# Patient Record
Sex: FEMALE | Race: WHITE | NOT HISPANIC OR LATINO | Employment: STUDENT
[De-identification: names, ages, dates, MRNs, and addresses within clinical notes are randomized per-mention and may not be internally consistent; named-entity substitution may affect disease eponyms.]

---

## 2017-12-24 ENCOUNTER — HEALTH MAINTENANCE LETTER (OUTPATIENT)
Age: 22
End: 2017-12-24

## 2019-03-01 ENCOUNTER — ANESTHESIA - HEALTHEAST (OUTPATIENT)
Dept: SURGERY | Facility: CLINIC | Age: 24
End: 2019-03-01

## 2019-03-01 ENCOUNTER — SURGERY - HEALTHEAST (OUTPATIENT)
Dept: SURGERY | Facility: CLINIC | Age: 24
End: 2019-03-01

## 2019-03-01 ASSESSMENT — MIFFLIN-ST. JEOR: SCORE: 1384.99

## 2019-03-02 ASSESSMENT — MIFFLIN-ST. JEOR: SCORE: 1429.43

## 2021-06-02 VITALS — HEIGHT: 68 IN | WEIGHT: 139.9 LBS | BODY MASS INDEX: 21.2 KG/M2

## 2021-06-03 ENCOUNTER — RECORDS - HEALTHEAST (OUTPATIENT)
Dept: ADMINISTRATIVE | Facility: CLINIC | Age: 26
End: 2021-06-03

## 2021-06-24 NOTE — ANESTHESIA PREPROCEDURE EVALUATION
Anesthesia Evaluation      Patient summary reviewed   No history of anesthetic complications     Airway   Mallampati: II   Pulmonary - negative ROS and normal exam                          Cardiovascular - negative ROS and normal exam   Neuro/Psych - negative ROS     Endo/Other - negative ROS      GI/Hepatic/Renal - negative ROS           Dental                         Anesthesia Plan  Planned anesthetic: general endotracheal    ASA 1 - emergent     Anesthetic plan and risks discussed with: patient  Anesthesia plan special considerations: rapid sequence induction,

## 2021-06-24 NOTE — ANESTHESIA POSTPROCEDURE EVALUATION
Patient: Shital Tamayo  DILATION AND CURETTAGE, UTERUS, USING SUCTION  Anesthesia type: general    Patient location: PACU  Last vitals:   Vitals:    03/02/19 0110   BP: 105/65   Pulse: 73   Resp: 16   Temp:    SpO2: 97%     Post vital signs: stable  Level of consciousness: awake and responds to simple questions  Post-anesthesia pain: pain controlled  Post-anesthesia nausea and vomiting: no  Pulmonary: unassisted, return to baseline  Cardiovascular: stable and blood pressure at baseline  Hydration: adequate  Anesthetic events: no    QCDR Measures:  ASA# 11 - Yasmeen-op Cardiac Arrest: ASA11B - Patient did NOT experience unanticipated cardiac arrest  ASA# 12 - Yasmeen-op Mortality Rate: ASA12B - Patient did NOT die  ASA# 13 - PACU Re-Intubation Rate: ASA13B - Patient did NOT require a new airway mgmt  ASA# 10 - Composite Anes Safety: ASA10A - No serious adverse event    Additional Notes:

## 2021-12-20 ENCOUNTER — TRANSFERRED RECORDS (OUTPATIENT)
Dept: HEALTH INFORMATION MANAGEMENT | Facility: CLINIC | Age: 26
End: 2021-12-20
Payer: COMMERCIAL

## 2021-12-21 LAB
HEPATITIS B SURFACE ANTIGEN (EXTERNAL): NEGATIVE
HEPATITIS C ANTIBODY (EXTERNAL): NEGATIVE
HIV1+2 AB SERPL QL IA: NONREACTIVE
RUBELLA ANTIBODY IGG (EXTERNAL): NORMAL
TREPONEMA PALLIDUM ANTIBODY (EXTERNAL): NONREACTIVE

## 2021-12-27 ENCOUNTER — MEDICAL CORRESPONDENCE (OUTPATIENT)
Dept: HEALTH INFORMATION MANAGEMENT | Facility: CLINIC | Age: 26
End: 2021-12-27
Payer: COMMERCIAL

## 2021-12-29 ENCOUNTER — TRANSCRIBE ORDERS (OUTPATIENT)
Dept: OTHER | Age: 26
End: 2021-12-29
Payer: COMMERCIAL

## 2021-12-29 DIAGNOSIS — G43.909 MIGRAINE WITHOUT STATUS MIGRAINOSUS, NOT INTRACTABLE, UNSPECIFIED MIGRAINE TYPE: Primary | ICD-10-CM

## 2022-01-28 ENCOUNTER — TRANSFERRED RECORDS (OUTPATIENT)
Dept: HEALTH INFORMATION MANAGEMENT | Facility: CLINIC | Age: 27
End: 2022-01-28
Payer: COMMERCIAL

## 2022-01-28 ENCOUNTER — MEDICAL CORRESPONDENCE (OUTPATIENT)
Dept: HEALTH INFORMATION MANAGEMENT | Facility: CLINIC | Age: 27
End: 2022-01-28
Payer: COMMERCIAL

## 2022-02-14 NOTE — TELEPHONE ENCOUNTER
FUTURE VISIT INFORMATION      FUTURE VISIT INFORMATION:    Date: 2/23/2022    Time: 830am    [place] [facility] [place] INFORMATION:    Referring provider:  Dr. Nieves    Referring providers clinic:  [facility]     Reason for visit/diagnosis  Headaches     RECORDS REQUESTED FROM:       Clinic name Comments Records Status Imaging Status   [facility]  Scanned to Chart No Images          [facility]  CT Head-6/18/2018, 12/5/2017    CT Cervical Spine-12/5/2017 Care Everywhere Requested to PACS                       2/14/2022-Request for images faxed to [facility] @ 905am    2/22/2022-[facility] Images now in PACS-MR @ 526am

## 2022-02-23 ENCOUNTER — TELEPHONE (OUTPATIENT)
Dept: PHYSICAL MEDICINE AND REHAB | Facility: CLINIC | Age: 27
End: 2022-02-23

## 2022-02-23 ENCOUNTER — PRE VISIT (OUTPATIENT)
Dept: NEUROLOGY | Facility: CLINIC | Age: 27
End: 2022-02-23

## 2022-02-23 ENCOUNTER — VIRTUAL VISIT (OUTPATIENT)
Dept: NEUROLOGY | Facility: CLINIC | Age: 27
End: 2022-02-23
Attending: OBSTETRICS & GYNECOLOGY
Payer: COMMERCIAL

## 2022-02-23 DIAGNOSIS — H81.10 BENIGN PAROXYSMAL POSITIONAL VERTIGO, UNSPECIFIED LATERALITY: ICD-10-CM

## 2022-02-23 DIAGNOSIS — G43.719 INTRACTABLE CHRONIC MIGRAINE WITHOUT AURA AND WITHOUT STATUS MIGRAINOSUS: Primary | ICD-10-CM

## 2022-02-23 PROCEDURE — 99205 OFFICE O/P NEW HI 60 MIN: CPT | Mod: 95 | Performed by: NURSE PRACTITIONER

## 2022-02-23 RX ORDER — OXYCODONE AND ACETAMINOPHEN 5; 325 MG/1; MG/1
1 TABLET ORAL
Status: ON HOLD | COMMUNITY
Start: 2022-01-26 | End: 2022-05-16

## 2022-02-23 RX ORDER — LIDOCAINE HYDROCHLORIDE 40 MG/ML
SOLUTION TOPICAL
Qty: 50 ML | Refills: 9 | Status: ON HOLD | OUTPATIENT
Start: 2022-02-23 | End: 2022-05-16

## 2022-02-23 ASSESSMENT — HEADACHE IMPACT TEST (HIT 6)
HOW OFTEN HAVE YOU FELT TOO TIRED TO WORK BECAUSE OF YOUR HEADACHES: VERY OFTEN
WHEN YOU HAVE HEADACHES HOW OFTEN IS THE PAIN SEVERE: VERY OFTEN
WHEN YOU HAVE A HEADACHE HOW OFTEN DO YOU WISH YOU COULD LIE DOWN: VERY OFTEN
HIT6 TOTAL SCORE: 64
HOW OFTEN DO HEADACHES LIMIT YOUR DAILY ACTIVITIES: SOMETIMES
HOW OFTEN DO HEADACHES LIMIT YOUR DAILY ACTIVITIES: SOMETIMES
HOW OFTEN HAVE YOU FELT TOO TIRED TO WORK BECAUSE OF YOUR HEADACHES: VERY OFTEN
HOW OFTEN DID HEADACHS LIMIT CONCENTRATION ON WORK OR DAILY ACTIVITY: VERY OFTEN
WHEN YOU HAVE A HEADACHE HOW OFTEN DO YOU WISH YOU COULD LIE DOWN: VERY OFTEN
HIT6 TOTAL SCORE: 64
HOW OFTEN HAVE YOU FELT FED UP OR IRRITATED BECAUSE OF YOUR HEADACHES: SOMETIMES
HOW OFTEN DID HEADACHS LIMIT CONCENTRATION ON WORK OR DAILY ACTIVITY: VERY OFTEN
HOW OFTEN HAVE YOU FELT FED UP OR IRRITATED BECAUSE OF YOUR HEADACHES: SOMETIMES
WHEN YOU HAVE HEADACHES HOW OFTEN IS THE PAIN SEVERE: VERY OFTEN

## 2022-02-23 NOTE — TELEPHONE ENCOUNTER
[facility] Call Center    Phone Message    May a detailed message be left on voicemail: yes     Reason for Call: Appointment Intake    Referring Provider Name: Jessica Zapien  Diagnosis and/or Symptoms: Vertigo,     Pt has a referral to be seen for Benign paroxysmal positional vertigo, unspecified laterality .  This is an urgent referral and referring provider requesteed 1-2 weeks.  Per protocol,  I am unable to schedule.  Please review and contact patient to schedule as appropriate.     Action Taken: Other[place] [facility]    Travel Screening: Not Applicable

## 2022-02-23 NOTE — LETTER
2/23/2022       RE: Shital Tamayo  [address] [address]     Dear Colleague,    Thank you for referring your patient, Shital Tamayo, to the [facility] [employer] [facility]. Please see a copy of my visit note below.        Distant Location (provider location):  [facility]OLOG[facility]     Platform used for Video Visit: RozinaWell     MIGRAINE DISABILITY ASSESSMENT (MIDAS)    On how many days in the last 3 months did you miss work or school because of your headaches?  6    How many days in the last 3 months was your productivity at work or school reduced by half or more because of your headaches? (Do not include days you counted in question 1 where you missed work or school.)  7    On how many days in the last 3 months did you not do household work (such as housework, home repairs and maintenance, shopping, caring for children and relatives) because of your headaches?  7    How many days in the last 3 months was your productivity in household work reduced by half or more because of your headaches? (Do not include days you counted in question 3 where you did not do household work).  10    On how many days in the last 3 months did you miss family, social, or lesiure activities because of your headaches?  7    MIDAS Total Score: 37    On how many days in the last 3 months did you have a headache? (If a headache lasted more than 1 day, count each day.)   15    On a scale of 0 - 10, on average how painful were these headaches (where 0 = no pain at all, and 10 = pain as bad as it can be.)  8    Last Patient-Answered HIT-6 Questionnaire  HIT-6 2/23/2022   When you have headaches, how often is the pain severe 11   How often do headaches limit your ability to do usual daily activities including household work, work, school, or social activities? 10   When you have a headache, how often do you wish you could lie  down? 11   In the past 4 weeks, how often have you felt too tired to do work or daily activities because of your headaches 11   In the past 4 weeks, how often have you felt fed up or irritated because of your headaches 10   In the past 4 weeks, how often did headaches limit your ability to concentrate on work or daily activities 11   HIT-6 Total Score 64       Shital is a 27 year old who is being evaluated via a billable video visit.        Assessment & Plan     (G43.062) Intractable chronic migraine without aura and without status migrainosus  (primary encounter diagnosis)  Comment: see below  Plan: lidocaine (XYLOCAINE) 4 % external solution,         PHYSIATRY REFERRAL, Syringe, Disposable,         (SYRINGE LUER SLIP) 1 ML MISC, ATOMIZER/POWDER         BLOWER, MRA Brain ([facility]) wo         Contrast            (H81.10) Benign paroxysmal positional vertigo, unspecified laterality  Comment: see below  Plan: Physical Therapy Referral     Chronic migraine and vertigo. Vertigo possible BPV vs migrainous  Discussed treatment options  ONB/TP -PMR referral   PT myofascial release  -can be an option  Lidocaine nasal spay -Order as a lidocaine 4% -50 ml bottle   a vial of lidocaine 4% topical with a 1 mL syringe with atomizer tip  Spray 0.5 mg once in each nostril q 4-6 hours as needed for headache.  lay down with head tilted back for 5 minutes after if preferred   May try ondansetron for nausea if Ok with Ob+may try benadryl +acetaminophen  Vitamin B2 200 mg OTC daily   May try adding magnesium if Ok per Ob  May try a small dose of gabapentin 100 mg  as needed for a severe pain -check with Ob   For headache prevention-may consider cyproheptadine -will check with PharmD   Follow up as needed     Vertigo -referral to PT Vertigo/Dizziness Center     FH of aneurysm-mother had 3 treated and paternal side-uncles and cousins. Brain MRA in 2014 presumed normal. Brain MRA for reassurance.     SH:  School psychologist        Mary Isaacs is a 27 year old who presents for the following health issues -headache, dizziness  HPI   Second pregnancy but first child, 17 weeks. HEATH August 6th.   All migraine treatment stopped.   Migraine headaches before pregnancy. Associated with light and noise sensitivity, movement would trigger headaches. Typical loc -left side and pounding and sometimes a dull feeling.   Headaches onset in early 20s. FH-of headaches and brain aneurysm.   Headaches currently 3-4/week and duration 3-9 hours. Tota headaches daily but some more or less severe.   Before pregnancy-3-4/week and chronic migraine headaches.   Headache treatment   Propranolol, topiramate, amitriptyline, tizanidine, sumatriptan as needed  Sumatriptan worked with other meds.   Currently uses tylenol as needed and sometimes advil. Percocet once in a while.   Compazine as needed in the past.     Vertigo is new -first time three weeks ago and a few times per week. Vertigo triggered by moving her head, bending, laying down. Would last all day and nausea. Gets headache. Vision is spinning. Feels really dizzy. Patient is staying hydrated.   A diet coke half a can per day.     History of anxiety and on lexapro. Anxiety controlled.     No new headache symptoms reported today.     Brain MRI and MRA in 2013 -presumed normal, not able to review images -not available  Brain MRI in 2014-  Impression:    1. Normal brain.    2. Pituitary gland is normal in size.  No imaging evidence for pituitary adenoma.    3. Right maxillary sinus mucous retention cyst.        Head CT in 2018-reviewed report and normal    Patient has been to [facility] -note is blurry unable to review      DATA:  Records reviewed-Ob/Gyn note      Objective    Vitals - Patient Reported  Weight (Patient Reported): 65.8 kg (145 lb)  Pain Score: No Pain (0)    Physical Exam   GENERAL: Healthy, alert and no distress  EYES: Eyes grossly normal to inspection.  No discharge or erythema, or  obvious scleral/conjunctival abnormalities.  RESP: No audible wheeze, cough, or visible cyanosis.  No visible retractions or increased work of breathing.    SKIN: Visible skin clear. No significant rash, abnormal pigmentation or lesions.  NEURO: Face is symmetrical and symmetrical facial expressions.  Mentation and speech appropriate for age.  PSYCH: Mentation appears normal, affect normal, judgement and insight intact, normal speech and appearance well-groomed.    I discussed all my recommendations with Shital Tamayo who verbalizes understanding and comfortable with the plan.  All of patient's questions were answered from the best of my knowledge.  Patient is in agreement with the plan.     58 minutes spent on the date of the encounter doing video access, chart  review,  results review,  meds review, treatment plan, documentation and further activities as noted above    KYLE Tompkins, CNP [facility]  Headache certified  [facility]

## 2022-02-23 NOTE — PROGRESS NOTES
Shital is a 27 year old who is being evaluated via a billable video visit.      How would you like to obtain your AVS? MyChart  If the video visit is dropped, the invitation should be resent by: Text to cell phone: [phone]  Will anyone else be joining your video visit? No      Video Start Time: 8:31 AM  Video-Visit Details    Type of service:  Video Visit    Video End Time:9:24 AM    Originating Location (pt. Location): Home    Distant Location (provider location):  [facility] NEUROLOG[facility]     Platform used for Video Visit: Lisa     MIGRAINE DISABILITY ASSESSMENT (MIDAS)    On how many days in the last 3 months did you miss work or school because of your headaches?  6    How many days in the last 3 months was your productivity at work or school reduced by half or more because of your headaches? (Do not include days you counted in question 1 where you missed work or school.)  7    On how many days in the last 3 months did you not do household work (such as housework, home repairs and maintenance, shopping, caring for children and relatives) because of your headaches?  7    How many days in the last 3 months was your productivity in household work reduced by half or more because of your headaches? (Do not include days you counted in question 3 where you did not do household work).  10    On how many days in the last 3 months did you miss family, social, or lesiure activities because of your headaches?  7    MIDAS Total Score: 37    On how many days in the last 3 months did you have a headache? (If a headache lasted more than 1 day, count each day.)   15    On a scale of 0 - 10, on average how painful were these headaches (where 0 = no pain at all, and 10 = pain as bad as it can be.)  8    Last Patient-Answered HIT-6 Questionnaire  HIT-6 2/23/2022   When you have headaches, how often is the pain severe 11   How often do headaches limit your ability to do usual daily activities including  household work, work, school, or social activities? 10   When you have a headache, how often do you wish you could lie down? 11   In the past 4 weeks, how often have you felt too tired to do work or daily activities because of your headaches 11   In the past 4 weeks, how often have you felt fed up or irritated because of your headaches 10   In the past 4 weeks, how often did headaches limit your ability to concentrate on work or daily activities 11   HIT-6 Total Score 64       Shital is a 27 year old who is being evaluated via a billable video visit.        Assessment & Plan     (G43.719) Intractable chronic migraine without aura and without status migrainosus  (primary encounter diagnosis)  Comment: see below  Plan: lidocaine (XYLOCAINE) 4 % external solution,         PHYSIATRY REFERRAL, Syringe, Disposable,         (SYRINGE LUER SLIP) 1 ML MISC, ATOMIZER/POWDER         BLOWER, MRA Brain ([facility]) wo         Contrast            (H81.10) Benign paroxysmal positional vertigo, unspecified laterality  Comment: see below  Plan: Physical Therapy Referral     Chronic migraine and vertigo. Vertigo possible BPV vs migrainous  Discussed treatment options  ONB/TP -PMR referral   PT myofascial release  -can be an option  Lidocaine nasal spay -Order as a lidocaine 4% -50 ml bottle   a vial of lidocaine 4% topical with a 1 mL syringe with atomizer tip  Spray 0.5 mg once in each nostril q 4-6 hours as needed for headache.  lay down with head tilted back for 5 minutes after if preferred   May try ondansetron for nausea if Ok with Ob+may try benadryl +acetaminophen  Vitamin B2 200 mg OTC daily   May try adding magnesium if Ok per Ob  May try a small dose of gabapentin 100 mg  as needed for a severe pain -check with Ob   For headache prevention-may consider cyproheptadine -will check with PharmD   Follow up as needed     Vertigo -referral to PT Vertigo/Dizziness Center     FH of aneurysm-mother had 3 treated and paternal  side-uncles and cousins. Brain MRA in 2014 presumed normal. Brain MRA for reassurance.     SH:  School psychologist       Mary   Shital is a 27 year old who presents for the following health issues -headache, dizziness  HPI   Second pregnancy but first child, 17 weeks. HEATH August 6th.   All migraine treatment stopped.   Migraine headaches before pregnancy. Associated with light and noise sensitivity, movement would trigger headaches. Typical loc -left side and pounding and sometimes a dull feeling.   Headaches onset in early 20s. FH-of headaches and brain aneurysm.   Headaches currently 3-4/week and duration 3-9 hours. Tota headaches daily but some more or less severe.   Before pregnancy-3-4/week and chronic migraine headaches.   Headache treatment   Propranolol, topiramate, amitriptyline, tizanidine, sumatriptan as needed  Sumatriptan worked with other meds.   Currently uses tylenol as needed and sometimes advil. Percocet once in a while.   Compazine as needed in the past.     Vertigo is new -first time three weeks ago and a few times per week. Vertigo triggered by moving her head, bending, laying down. Would last all day and nausea. Gets headache. Vision is spinning. Feels really dizzy. Patient is staying hydrated.   A diet coke half a can per day.     History of anxiety and on lexapro. Anxiety controlled.     No new headache symptoms reported today.     Brain MRI and MRA in 2013 -presumed normal, not able to review images -not available  Brain MRI in 2014-  Impression:    1. Normal brain.    2. Pituitary gland is normal in size.  No imaging evidence for pituitary adenoma.    3. Right maxillary sinus mucous retention cyst.        Head CT in 2018-reviewed report and normal    Patient has been to [facility] -note is blurry unable to review      DATA:  Records reviewed-Ob/Gyn note      Objective    Vitals - Patient Reported  Weight (Patient Reported): 65.8 kg (145 lb)  Pain Score: No Pain (0)    Physical  Exam   GENERAL: Healthy, alert and no distress  EYES: Eyes grossly normal to inspection.  No discharge or erythema, or obvious scleral/conjunctival abnormalities.  RESP: No audible wheeze, cough, or visible cyanosis.  No visible retractions or increased work of breathing.    SKIN: Visible skin clear. No significant rash, abnormal pigmentation or lesions.  NEURO: Face is symmetrical and symmetrical facial expressions.  Mentation and speech appropriate for age.  PSYCH: Mentation appears normal, affect normal, judgement and insight intact, normal speech and appearance well-groomed.    I discussed all my recommendations with Shital Tamayo who verbalizes understanding and comfortable with the plan.  All of patient's questions were answered from the best of my knowledge.  Patient is in agreement with the plan.     58 minutes spent on the date of the encounter doing video access, chart  review,  results review,  meds review, treatment plan, documentation and further activities as noted above    KYLE Tompkins, CNP [facility]  Headache certified  [facility]

## 2022-02-24 NOTE — TELEPHONE ENCOUNTER
Talked with patient and scheduled her for a consult appt with Dr. Platt on 3/18/22 at 3:50 PM. Patient is a referral from Gadiel Zapien for Chronic Migraine Dx.    Gerardo Dan, EMT

## 2022-03-07 ENCOUNTER — DOCUMENTATION ONLY (OUTPATIENT)
Dept: PHYSICAL MEDICINE AND REHAB | Facility: CLINIC | Age: 27
End: 2022-03-07
Payer: COMMERCIAL

## 2022-03-07 NOTE — PROGRESS NOTES
Tried to call patient multiple times in the last week, but patient has not answered and voicemail box is full. Patient's mother has also not answered and voicemail box is full. Patient's appointment on 3/18/22 with Dr. Platt had to be moved due to template, so a new appointment was scheduled for patient on 3/25/22 at 9 AM.     Sent patient a letter to confirm this new appointment day and time, along with the call center number in case any changes/re-schedules need to occur.    Gerardo Dan, EMT

## 2022-03-25 ENCOUNTER — TRANSFERRED RECORDS (OUTPATIENT)
Dept: HEALTH INFORMATION MANAGEMENT | Facility: CLINIC | Age: 27
End: 2022-03-25

## 2022-04-29 ENCOUNTER — MEDICAL CORRESPONDENCE (OUTPATIENT)
Dept: HEALTH INFORMATION MANAGEMENT | Facility: CLINIC | Age: 27
End: 2022-04-29
Payer: COMMERCIAL

## 2022-05-02 ENCOUNTER — TRANSCRIBE ORDERS (OUTPATIENT)
Dept: MATERNAL FETAL MEDICINE | Facility: HOSPITAL | Age: 27
End: 2022-05-02
Payer: MEDICAID

## 2022-05-02 DIAGNOSIS — O26.90 PREGNANCY RELATED CONDITION: Primary | ICD-10-CM

## 2022-05-05 ENCOUNTER — PRE VISIT (OUTPATIENT)
Dept: MATERNAL FETAL MEDICINE | Facility: HOSPITAL | Age: 27
End: 2022-05-05
Payer: MEDICAID

## 2022-05-09 ENCOUNTER — TELEPHONE (OUTPATIENT)
Dept: MATERNAL FETAL MEDICINE | Facility: CLINIC | Age: 27
End: 2022-05-09

## 2022-05-09 ENCOUNTER — ANCILLARY PROCEDURE (OUTPATIENT)
Dept: ULTRASOUND IMAGING | Facility: HOSPITAL | Age: 27
End: 2022-05-09
Attending: OBSTETRICS & GYNECOLOGY
Payer: MEDICAID

## 2022-05-09 ENCOUNTER — OFFICE VISIT (OUTPATIENT)
Dept: MATERNAL FETAL MEDICINE | Facility: HOSPITAL | Age: 27
End: 2022-05-09
Attending: OBSTETRICS & GYNECOLOGY
Payer: MEDICAID

## 2022-05-09 ENCOUNTER — LAB (OUTPATIENT)
Dept: LAB | Facility: HOSPITAL | Age: 27
End: 2022-05-09
Attending: OBSTETRICS & GYNECOLOGY
Payer: MEDICAID

## 2022-05-09 VITALS — SYSTOLIC BLOOD PRESSURE: 136 MMHG | DIASTOLIC BLOOD PRESSURE: 90 MMHG

## 2022-05-09 DIAGNOSIS — O36.5990 PREGNANCY AFFECTED BY FETAL GROWTH RESTRICTION: Primary | ICD-10-CM

## 2022-05-09 DIAGNOSIS — O36.5990 PREGNANCY AFFECTED BY FETAL GROWTH RESTRICTION: ICD-10-CM

## 2022-05-09 DIAGNOSIS — O26.90 PREGNANCY RELATED CONDITION: ICD-10-CM

## 2022-05-09 LAB
ALBUMIN MFR UR ELPH: <7 MG/DL
ALT SERPL W P-5'-P-CCNC: <9 U/L (ref 0–45)
AST SERPL W P-5'-P-CCNC: 15 U/L (ref 0–40)
CREAT SERPL-MCNC: 0.66 MG/DL (ref 0.6–1.1)
CREAT UR-MCNC: 16 MG/DL
ERYTHROCYTE [DISTWIDTH] IN BLOOD BY AUTOMATED COUNT: 12.8 % (ref 10–15)
GFR SERPL CREATININE-BSD FRML MDRD: >90 ML/MIN/1.73M2
HCT VFR BLD AUTO: 37.2 % (ref 35–47)
HGB BLD-MCNC: 12.3 G/DL (ref 11.7–15.7)
MCH RBC QN AUTO: 29.4 PG (ref 26.5–33)
MCHC RBC AUTO-ENTMCNC: 33.1 G/DL (ref 31.5–36.5)
MCV RBC AUTO: 89 FL (ref 78–100)
PLATELET # BLD AUTO: 249 10E3/UL (ref 150–450)
PROT/CREAT 24H UR: NORMAL MG/G{CREAT}
RBC # BLD AUTO: 4.19 10E6/UL (ref 3.8–5.2)
WBC # BLD AUTO: 10.9 10E3/UL (ref 4–11)

## 2022-05-09 PROCEDURE — 59025 FETAL NON-STRESS TEST: CPT

## 2022-05-09 PROCEDURE — 36415 COLL VENOUS BLD VENIPUNCTURE: CPT

## 2022-05-09 PROCEDURE — 82565 ASSAY OF CREATININE: CPT

## 2022-05-09 PROCEDURE — 84450 TRANSFERASE (AST) (SGOT): CPT

## 2022-05-09 PROCEDURE — 85027 COMPLETE CBC AUTOMATED: CPT

## 2022-05-09 PROCEDURE — 76811 OB US DETAILED SNGL FETUS: CPT

## 2022-05-09 PROCEDURE — 84460 ALANINE AMINO (ALT) (SGPT): CPT

## 2022-05-09 PROCEDURE — 59025 FETAL NON-STRESS TEST: CPT | Mod: 26 | Performed by: OBSTETRICS & GYNECOLOGY

## 2022-05-09 PROCEDURE — 84156 ASSAY OF PROTEIN URINE: CPT

## 2022-05-09 PROCEDURE — 76820 UMBILICAL ARTERY ECHO: CPT | Mod: 26 | Performed by: OBSTETRICS & GYNECOLOGY

## 2022-05-09 PROCEDURE — 76811 OB US DETAILED SNGL FETUS: CPT | Mod: 26 | Performed by: OBSTETRICS & GYNECOLOGY

## 2022-05-09 PROCEDURE — 99204 OFFICE O/P NEW MOD 45 MIN: CPT | Mod: 25 | Performed by: OBSTETRICS & GYNECOLOGY

## 2022-05-09 NOTE — TELEPHONE ENCOUNTER
I called Shital to let her know her HELLP labs were within normal limits today.  Will continue with twice weekly NST/UAR in our office.    Gladis Bella MD  Maternal Fetal Medicine

## 2022-05-09 NOTE — PROGRESS NOTES
"Please see \"Imaging\" tab under Chart Review for full details.    Gladis Bella MD  Maternal Fetal Medicine    "

## 2022-05-12 ENCOUNTER — HOSPITAL ENCOUNTER (OUTPATIENT)
Dept: ULTRASOUND IMAGING | Facility: CLINIC | Age: 27
Discharge: HOME | End: 2022-05-12
Attending: OBSTETRICS & GYNECOLOGY
Payer: MEDICAID

## 2022-05-12 ENCOUNTER — OFFICE VISIT (OUTPATIENT)
Dept: MATERNAL FETAL MEDICINE | Facility: CLINIC | Age: 27
End: 2022-05-12
Attending: OBSTETRICS & GYNECOLOGY
Payer: MEDICAID

## 2022-05-12 DIAGNOSIS — O36.5990 PREGNANCY AFFECTED BY FETAL GROWTH RESTRICTION: Primary | ICD-10-CM

## 2022-05-12 DIAGNOSIS — O36.5990 PREGNANCY AFFECTED BY FETAL GROWTH RESTRICTION: ICD-10-CM

## 2022-05-12 PROCEDURE — 59025 FETAL NON-STRESS TEST: CPT

## 2022-05-12 PROCEDURE — 59025 FETAL NON-STRESS TEST: CPT | Mod: 26 | Performed by: OBSTETRICS & GYNECOLOGY

## 2022-05-12 PROCEDURE — 76820 UMBILICAL ARTERY ECHO: CPT | Mod: 26 | Performed by: OBSTETRICS & GYNECOLOGY

## 2022-05-12 PROCEDURE — 76815 OB US LIMITED FETUS(S): CPT | Mod: 26 | Performed by: OBSTETRICS & GYNECOLOGY

## 2022-05-12 PROCEDURE — 76815 OB US LIMITED FETUS(S): CPT

## 2022-05-12 PROCEDURE — 59025 FETAL NON-STRESS TEST: CPT | Performed by: OBSTETRICS & GYNECOLOGY

## 2022-05-12 NOTE — PROGRESS NOTES
"Please see \"Imaging\" tab under \"Chart Review\" for details of today's visit.    Joceline Garcia    "

## 2022-05-15 ENCOUNTER — HEALTH MAINTENANCE LETTER (OUTPATIENT)
Age: 27
End: 2022-05-15

## 2022-05-16 ENCOUNTER — HOSPITAL ENCOUNTER (OUTPATIENT)
Facility: HOSPITAL | Age: 27
Discharge: HOME | End: 2022-05-16
Attending: OBSTETRICS & GYNECOLOGY | Admitting: OBSTETRICS & GYNECOLOGY
Payer: MEDICAID

## 2022-05-16 ENCOUNTER — OFFICE VISIT (OUTPATIENT)
Dept: MATERNAL FETAL MEDICINE | Facility: HOSPITAL | Age: 27
End: 2022-05-16
Attending: OBSTETRICS & GYNECOLOGY
Payer: MEDICAID

## 2022-05-16 ENCOUNTER — ANCILLARY PROCEDURE (OUTPATIENT)
Dept: ULTRASOUND IMAGING | Facility: HOSPITAL | Age: 27
End: 2022-05-16
Attending: OBSTETRICS & GYNECOLOGY
Payer: MEDICAID

## 2022-05-16 VITALS — SYSTOLIC BLOOD PRESSURE: 140 MMHG | TEMPERATURE: 98.3 F | DIASTOLIC BLOOD PRESSURE: 96 MMHG

## 2022-05-16 DIAGNOSIS — O36.5990 PREGNANCY AFFECTED BY FETAL GROWTH RESTRICTION: Primary | ICD-10-CM

## 2022-05-16 DIAGNOSIS — O36.5990 PREGNANCY AFFECTED BY FETAL GROWTH RESTRICTION: ICD-10-CM

## 2022-05-16 PROBLEM — Z36.89 ENCOUNTER FOR TRIAGE IN PREGNANT PATIENT: Status: ACTIVE | Noted: 2022-05-16

## 2022-05-16 LAB
ALBUMIN UR-MCNC: NEGATIVE MG/DL
ALT SERPL W P-5'-P-CCNC: 13 U/L (ref 0–45)
APPEARANCE UR: CLEAR
AST SERPL W P-5'-P-CCNC: 14 U/L (ref 0–40)
BILIRUB UR QL STRIP: NEGATIVE
CLUE CELLS: NORMAL
COLOR UR AUTO: COLORLESS
CREAT SERPL-MCNC: 0.63 MG/DL (ref 0.6–1.1)
ERYTHROCYTE [DISTWIDTH] IN BLOOD BY AUTOMATED COUNT: 12.8 % (ref 10–15)
FFN SPECIMEN INTEGRITY: NORMAL
FIBRONECTIN FETAL VAG QL: NEGATIVE
GFR SERPL CREATININE-BSD FRML MDRD: >90 ML/MIN/1.73M2
GLUCOSE UR STRIP-MCNC: NEGATIVE MG/DL
HCT VFR BLD AUTO: 37 % (ref 35–47)
HGB BLD-MCNC: 12.3 G/DL (ref 11.7–15.7)
HGB UR QL STRIP: NEGATIVE
HOLD SPECIMEN: NORMAL
HOLD SPECIMEN: NORMAL
KETONES UR STRIP-MCNC: NEGATIVE MG/DL
LEUKOCYTE ESTERASE UR QL STRIP: NEGATIVE
MCH RBC QN AUTO: 29.1 PG (ref 26.5–33)
MCHC RBC AUTO-ENTMCNC: 33.2 G/DL (ref 31.5–36.5)
MCV RBC AUTO: 88 FL (ref 78–100)
NITRATE UR QL: NEGATIVE
PH UR STRIP: 6.5 [PH] (ref 5–7)
PLATELET # BLD AUTO: 254 10E3/UL (ref 150–450)
RBC # BLD AUTO: 4.23 10E6/UL (ref 3.8–5.2)
SP GR UR STRIP: 1 (ref 1–1.03)
TRICHOMONAS, WET PREP: NORMAL
UROBILINOGEN UR STRIP-MCNC: <2 MG/DL
WBC # BLD AUTO: 10.5 10E3/UL (ref 4–11)
WBC'S/HIGH POWER FIELD, WET PREP: NORMAL
YEAST, WET PREP: NORMAL

## 2022-05-16 PROCEDURE — 59025 FETAL NON-STRESS TEST: CPT

## 2022-05-16 PROCEDURE — 250N000013 HC RX MED GY IP 250 OP 250 PS 637: Performed by: OBSTETRICS & GYNECOLOGY

## 2022-05-16 PROCEDURE — 76815 OB US LIMITED FETUS(S): CPT

## 2022-05-16 PROCEDURE — 85027 COMPLETE CBC AUTOMATED: CPT | Performed by: OBSTETRICS & GYNECOLOGY

## 2022-05-16 PROCEDURE — 82731 ASSAY OF FETAL FIBRONECTIN: CPT | Performed by: OBSTETRICS & GYNECOLOGY

## 2022-05-16 PROCEDURE — 84450 TRANSFERASE (AST) (SGOT): CPT | Performed by: OBSTETRICS & GYNECOLOGY

## 2022-05-16 PROCEDURE — 99207 PR NO CHARGE LOS: CPT | Performed by: OBSTETRICS & GYNECOLOGY

## 2022-05-16 PROCEDURE — 76815 OB US LIMITED FETUS(S): CPT | Mod: 26 | Performed by: OBSTETRICS & GYNECOLOGY

## 2022-05-16 PROCEDURE — 59025 FETAL NON-STRESS TEST: CPT | Mod: 26 | Performed by: OBSTETRICS & GYNECOLOGY

## 2022-05-16 PROCEDURE — 82565 ASSAY OF CREATININE: CPT | Performed by: OBSTETRICS & GYNECOLOGY

## 2022-05-16 PROCEDURE — 250N000011 HC RX IP 250 OP 636: Performed by: OBSTETRICS & GYNECOLOGY

## 2022-05-16 PROCEDURE — 76820 UMBILICAL ARTERY ECHO: CPT | Mod: 26 | Performed by: OBSTETRICS & GYNECOLOGY

## 2022-05-16 PROCEDURE — 96372 THER/PROPH/DIAG INJ SC/IM: CPT | Performed by: OBSTETRICS & GYNECOLOGY

## 2022-05-16 PROCEDURE — G0463 HOSPITAL OUTPT CLINIC VISIT: HCPCS

## 2022-05-16 PROCEDURE — 84460 ALANINE AMINO (ALT) (SGPT): CPT | Performed by: OBSTETRICS & GYNECOLOGY

## 2022-05-16 PROCEDURE — 81003 URINALYSIS AUTO W/O SCOPE: CPT | Performed by: OBSTETRICS & GYNECOLOGY

## 2022-05-16 PROCEDURE — 87210 SMEAR WET MOUNT SALINE/INK: CPT | Performed by: OBSTETRICS & GYNECOLOGY

## 2022-05-16 PROCEDURE — 36415 COLL VENOUS BLD VENIPUNCTURE: CPT | Performed by: OBSTETRICS & GYNECOLOGY

## 2022-05-16 RX ORDER — OXYCODONE AND ACETAMINOPHEN 5; 325 MG/1; MG/1
2 TABLET ORAL ONCE
Status: COMPLETED | OUTPATIENT
Start: 2022-05-16 | End: 2022-05-16

## 2022-05-16 RX ORDER — LABETALOL 200 MG/1
200 TABLET, FILM COATED ORAL EVERY 12 HOURS SCHEDULED
Status: DISCONTINUED | OUTPATIENT
Start: 2022-05-16 | End: 2022-05-16

## 2022-05-16 RX ORDER — ACETAMINOPHEN 325 MG/1
975 TABLET ORAL ONCE
Status: COMPLETED | OUTPATIENT
Start: 2022-05-16 | End: 2022-05-16

## 2022-05-16 RX ORDER — HYDROXYZINE HYDROCHLORIDE 50 MG/1
50 TABLET, FILM COATED ORAL 3 TIMES DAILY PRN
Status: ON HOLD | COMMUNITY
End: 2022-06-14

## 2022-05-16 RX ORDER — LIDOCAINE 40 MG/G
CREAM TOPICAL
Status: DISCONTINUED | OUTPATIENT
Start: 2022-05-16 | End: 2022-05-16 | Stop reason: HOSPADM

## 2022-05-16 RX ORDER — PRENATAL VIT/IRON FUM/FOLIC AC 27MG-0.8MG
1 TABLET ORAL DAILY
COMMUNITY
End: 2023-05-17

## 2022-05-16 RX ORDER — LABETALOL 200 MG/1
200 TABLET, FILM COATED ORAL EVERY 12 HOURS SCHEDULED
Status: DISCONTINUED | OUTPATIENT
Start: 2022-05-16 | End: 2022-05-16 | Stop reason: CLARIF

## 2022-05-16 RX ORDER — BETAMETHASONE SODIUM PHOSPHATE AND BETAMETHASONE ACETATE 3; 3 MG/ML; MG/ML
12 INJECTION, SUSPENSION INTRA-ARTICULAR; INTRALESIONAL; INTRAMUSCULAR; SOFT TISSUE EVERY 24 HOURS
Status: DISCONTINUED | OUTPATIENT
Start: 2022-05-16 | End: 2022-05-16 | Stop reason: HOSPADM

## 2022-05-16 RX ADMIN — BETAMETHASONE SODIUM PHOSPHATE AND BETAMETHASONE ACETATE 12 MG: 3; 3 INJECTION, SUSPENSION INTRA-ARTICULAR; INTRALESIONAL; INTRAMUSCULAR at 17:48

## 2022-05-16 RX ADMIN — LABETALOL HYDROCHLORIDE 200 MG: 200 TABLET, FILM COATED ORAL at 16:13

## 2022-05-16 RX ADMIN — OXYCODONE HYDROCHLORIDE AND ACETAMINOPHEN 2 TABLET: 5; 325 TABLET ORAL at 17:39

## 2022-05-16 RX ADMIN — ACETAMINOPHEN 975 MG: 325 TABLET ORAL at 14:26

## 2022-05-16 NOTE — PROGRESS NOTES
Pt desires to go home.     Not many further ctx.  No lof/vb.     Has a HA, usually treats with Percocet and states this is not anything new for her.     They live close and will do f/u with Ezequiel this week.     Beta is due again tomorrow and they are educated to have this injection on L&D or clinic.     Prec rev.    Labs rev.     Will need to do outpt close follow-up.     Barbara Hendrickson DO, FACOG  5/16/2022 5:21 PM

## 2022-05-16 NOTE — PROGRESS NOTES
Data: [facility] to [facility]: 2022 12:37 PM.  Reason for maternal/fetal assessment is uterine cramps and frequent urination. Patient is a .  Prenatal record reviewed. Pregnancy  has been complicated by IUGR.  Gestational Age 28w0d. VSS. Fetal movement present. Support person is present.   Action: Verbal consent for EFM. Triage assessment completed. Bill of rights reviewed.  Response: Patient verbalized agreement with plan. Will contact Dr Becki Nieves with update and further orders.

## 2022-05-16 NOTE — PROGRESS NOTES
OB ANTEPARTUM PROGRESS NOTE    IUP at 28w0d  here for evaluation of  labor sx.      No lof/vb.  More and more cramping.  Does not feel related to bowel or bladder.  Good fm.     Today had a sono at [facility] for severe IUGR and testing was reassuring, however, she was feeling more cramping, so was sent to L&D for evaluation.     Pt sees Dr. Becki Nieves at [facility].      She has no history of HTN or FH of HTN.  Frequently has HA's.   No other sx today.      Past Medical History:   Diagnosis Date     Anxiety      Depressive disorder      Past Surgical History:   Procedure Laterality Date     DILATION AND CURETTAGE N/A 3/1/2019    Procedure: DILATION AND CURETTAGE, UTERUS, USING SUCTION;  Surgeon: Becki Nieves MD;  Location: [facility];  Service: Obstetrics       Allergic factors known to influence symptoms: none  Smoke exposure in home: None      Current Facility-Administered Medications   Medication     acetaminophen (TYLENOL) tablet 975 mg     labetalol (NORMODYNE) tablet 200 mg     lidocaine (LMX4) cream     lidocaine 1 % 0.1-1 mL     sodium chloride (PF) 0.9% PF flush 3 mL     sodium chloride (PF) 0.9% PF flush 3 mL     , supportive.     SUBJECTIVE:  Patient feels well, other than mild HA. She has no new complaints. Patient states the baby is active, and is completing fetal kick counts. States that she is experiencing occasional contractions.  Patient denies headache, change in vision or upper abdominal pain. She denies vaginal bleeding. denies leaking of fluids. denies change in discharge.     OBJECTIVE:  BP (!) 140/96   Temp 98.3  F (36.8  C)   LMP 2021    Abd: gravid  NST: reassuring  TOCO: none  CV:  RRR  LUNGS:  Nonlabored  EXT:  NT, no CCE    LABS:  pending      MEDICATIONS:    Current Facility-Administered Medications:      acetaminophen (TYLENOL) tablet 975 mg, 975 mg, Oral, Once, Barbara Hendrickson MD     labetalol (NORMODYNE) tablet 200 mg, 200 mg,  Oral, Q12H FATUMA, Barbara Hendrickson MD     lidocaine (LMX4) cream, , Topical, Q1H PRN, Barbara Hendrickson MD     lidocaine 1 % 0.1-1 mL, 0.1-1 mL, Other, Q1H PRN, Barbara Hendrickson MD     sodium chloride (PF) 0.9% PF flush 3 mL, 3 mL, Intracatheter, Q8H, Barbara Hendrickson MD     sodium chloride (PF) 0.9% PF flush 3 mL, 3 mL, Intracatheter, q1 min prn, Barbara Hendrickson MD    ASSESSMENT:  27 year old  at 28w0d    labor evaluation    PLAN:  UA, FFN pending.  Consider BMZ.     Gestational HTN noted- will start Labetalol.   At home on rest as not working now.    Discussed with Dr. Nieves.    Barbara Hendrickson DO, FACOG  2022 2:21 PM

## 2022-05-16 NOTE — PROGRESS NOTES
Data: [facility] in the [facility] for Uterine contractions and elevated BPs  Action:  Presumed adequate fetal oxygenation documented (see flow record). Discharge instructions reviewed.  Patient instructed to report change in fetal movement, vaginal leaking of fluid or bleeding, abdominal pain, or any concerns related to the pregnancy to her nurse/physician. Went over signs/symptoms of pre-eclampsia.  Response: Orders to discharge home per Dr. Hendrickson.  Patient verbalized understanding of education and verbalized agreement with plan. Discharged to home at 1750. Patient knows to return to L&D tomorrow for 2nd Betamethasone injection.

## 2022-05-16 NOTE — NURSING NOTE
"Patient arrived to [facility] for NST and US. Pt c/o \"cramping since 0700 this morning when I woke up.\" Endorses +FM, no LOF/bleeding. Patient stated she has had burning, discharge, and frequency in urination. Per Dr. Garcia, plan for patient to present to [facility] L&D for evaluation. Spoke with L&D Charge, Letty and accepting of patient. Dr. Garcia spoke with patient OB, Dr. Ruth.     Carine Johnson RN on 5/16/2022 at 1:49 PM    *Late entry d/t patient care  "

## 2022-05-16 NOTE — NURSING NOTE
NST Performed due to severe fetal growth restriction.  Dr. Garcia reviewed efm tracing. See NST/BPP Doc Flowsheet tab.

## 2022-05-19 ENCOUNTER — OFFICE VISIT (OUTPATIENT)
Dept: MATERNAL FETAL MEDICINE | Facility: HOSPITAL | Age: 27
End: 2022-05-19
Attending: OBSTETRICS & GYNECOLOGY
Payer: MEDICAID

## 2022-05-19 ENCOUNTER — ANCILLARY PROCEDURE (OUTPATIENT)
Dept: ULTRASOUND IMAGING | Facility: HOSPITAL | Age: 27
End: 2022-05-19
Attending: OBSTETRICS & GYNECOLOGY
Payer: MEDICAID

## 2022-05-19 DIAGNOSIS — O36.5990 PREGNANCY AFFECTED BY FETAL GROWTH RESTRICTION: ICD-10-CM

## 2022-05-19 PROCEDURE — 59025 FETAL NON-STRESS TEST: CPT | Mod: 26 | Performed by: OBSTETRICS & GYNECOLOGY

## 2022-05-19 PROCEDURE — 76815 OB US LIMITED FETUS(S): CPT | Mod: 26 | Performed by: OBSTETRICS & GYNECOLOGY

## 2022-05-19 PROCEDURE — 76820 UMBILICAL ARTERY ECHO: CPT | Mod: 26 | Performed by: OBSTETRICS & GYNECOLOGY

## 2022-05-19 PROCEDURE — 59025 FETAL NON-STRESS TEST: CPT

## 2022-05-19 PROCEDURE — 99207 PR NO CHARGE LOS: CPT | Performed by: OBSTETRICS & GYNECOLOGY

## 2022-05-19 PROCEDURE — 76820 UMBILICAL ARTERY ECHO: CPT

## 2022-05-19 NOTE — NURSING NOTE
NST Performed due to severe fetal growth restriction.  Dr. Moralez reviewed efm tracing. See NST/BPP Doc Flowsheet tab.

## 2022-05-20 NOTE — PROGRESS NOTES
Please see the imaging tab for details of the ultrasound performed today.    Geni Moralez MD  Specialist in Maternal-Fetal Medicine

## 2022-05-24 ENCOUNTER — HOSPITAL ENCOUNTER (OUTPATIENT)
Facility: HOSPITAL | Age: 27
Setting detail: OBSERVATION
Discharge: HOME | End: 2022-05-25
Attending: OBSTETRICS & GYNECOLOGY | Admitting: OBSTETRICS & GYNECOLOGY
Payer: MEDICAID

## 2022-05-24 LAB
ABO/RH(D): NORMAL
ALBUMIN MFR UR ELPH: <7 MG/DL
ALT SERPL W P-5'-P-CCNC: 18 U/L (ref 0–45)
ANTIBODY SCREEN: NEGATIVE
AST SERPL W P-5'-P-CCNC: 15 U/L (ref 0–40)
CREAT SERPL-MCNC: 0.63 MG/DL (ref 0.6–1.1)
CREAT UR-MCNC: 28 MG/DL
ERYTHROCYTE [DISTWIDTH] IN BLOOD BY AUTOMATED COUNT: 13.2 % (ref 10–15)
GFR SERPL CREATININE-BSD FRML MDRD: >90 ML/MIN/1.73M2
HCT VFR BLD AUTO: 37.3 % (ref 35–47)
HGB BLD-MCNC: 12.4 G/DL (ref 11.7–15.7)
HOLD SPECIMEN: NORMAL
MCH RBC QN AUTO: 29 PG (ref 26.5–33)
MCHC RBC AUTO-ENTMCNC: 33.2 G/DL (ref 31.5–36.5)
MCV RBC AUTO: 87 FL (ref 78–100)
PLATELET # BLD AUTO: 286 10E3/UL (ref 150–450)
PROT/CREAT 24H UR: NORMAL MG/G{CREAT}
RBC # BLD AUTO: 4.27 10E6/UL (ref 3.8–5.2)
SPECIMEN EXPIRATION DATE: NORMAL
WBC # BLD AUTO: 12.7 10E3/UL (ref 4–11)

## 2022-05-24 PROCEDURE — 84450 TRANSFERASE (AST) (SGOT): CPT | Performed by: OBSTETRICS & GYNECOLOGY

## 2022-05-24 PROCEDURE — 84460 ALANINE AMINO (ALT) (SGPT): CPT | Performed by: OBSTETRICS & GYNECOLOGY

## 2022-05-24 PROCEDURE — 85027 COMPLETE CBC AUTOMATED: CPT | Performed by: OBSTETRICS & GYNECOLOGY

## 2022-05-24 PROCEDURE — 36415 COLL VENOUS BLD VENIPUNCTURE: CPT | Performed by: OBSTETRICS & GYNECOLOGY

## 2022-05-24 PROCEDURE — 250N000013 HC RX MED GY IP 250 OP 250 PS 637: Performed by: OBSTETRICS & GYNECOLOGY

## 2022-05-24 PROCEDURE — G0379 DIRECT REFER HOSPITAL OBSERV: HCPCS

## 2022-05-24 PROCEDURE — 999N000127 HC STATISTIC PERIPHERAL IV START W US GUIDANCE

## 2022-05-24 PROCEDURE — 84156 ASSAY OF PROTEIN URINE: CPT | Performed by: OBSTETRICS & GYNECOLOGY

## 2022-05-24 PROCEDURE — 82565 ASSAY OF CREATININE: CPT | Performed by: OBSTETRICS & GYNECOLOGY

## 2022-05-24 PROCEDURE — 86901 BLOOD TYPING SEROLOGIC RH(D): CPT | Performed by: OBSTETRICS & GYNECOLOGY

## 2022-05-24 RX ORDER — OXYCODONE HYDROCHLORIDE 5 MG/1
10 TABLET ORAL EVERY 4 HOURS PRN
Status: DISCONTINUED | OUTPATIENT
Start: 2022-05-24 | End: 2022-05-25 | Stop reason: HOSPADM

## 2022-05-24 RX ORDER — NALOXONE HYDROCHLORIDE 0.4 MG/ML
0.2 INJECTION, SOLUTION INTRAMUSCULAR; INTRAVENOUS; SUBCUTANEOUS
Status: DISCONTINUED | OUTPATIENT
Start: 2022-05-24 | End: 2022-05-25 | Stop reason: HOSPADM

## 2022-05-24 RX ORDER — LIDOCAINE HYDROCHLORIDE 10 MG/ML
INJECTION, SOLUTION EPIDURAL; INFILTRATION; INTRACAUDAL; PERINEURAL
Status: DISPENSED
Start: 2022-05-24 | End: 2022-05-25

## 2022-05-24 RX ORDER — NIFEDIPINE 10 MG/1
10-20 CAPSULE ORAL
Status: DISCONTINUED | OUTPATIENT
Start: 2022-05-24 | End: 2022-05-25 | Stop reason: HOSPADM

## 2022-05-24 RX ORDER — NALOXONE HYDROCHLORIDE 0.4 MG/ML
0.4 INJECTION, SOLUTION INTRAMUSCULAR; INTRAVENOUS; SUBCUTANEOUS
Status: DISCONTINUED | OUTPATIENT
Start: 2022-05-24 | End: 2022-05-25 | Stop reason: HOSPADM

## 2022-05-24 RX ORDER — OXYCODONE AND ACETAMINOPHEN 5; 325 MG/1; MG/1
2-3 TABLET ORAL EVERY 6 HOURS PRN
Status: ON HOLD | COMMUNITY
End: 2022-05-27

## 2022-05-24 RX ORDER — MAGNESIUM SULFATE HEPTAHYDRATE 40 MG/ML
2 INJECTION, SOLUTION INTRAVENOUS
Status: DISCONTINUED | OUTPATIENT
Start: 2022-05-24 | End: 2022-05-25 | Stop reason: HOSPADM

## 2022-05-24 RX ORDER — ESCITALOPRAM OXALATE 20 MG/1
20 TABLET ORAL DAILY
Status: DISCONTINUED | OUTPATIENT
Start: 2022-05-25 | End: 2022-05-25 | Stop reason: HOSPADM

## 2022-05-24 RX ORDER — SODIUM CHLORIDE, SODIUM LACTATE, POTASSIUM CHLORIDE, CALCIUM CHLORIDE 600; 310; 30; 20 MG/100ML; MG/100ML; MG/100ML; MG/100ML
10-125 INJECTION, SOLUTION INTRAVENOUS CONTINUOUS
Status: DISCONTINUED | OUTPATIENT
Start: 2022-05-24 | End: 2022-05-25 | Stop reason: HOSPADM

## 2022-05-24 RX ORDER — HYDROXYZINE HYDROCHLORIDE 50 MG/1
50 TABLET, FILM COATED ORAL EVERY 6 HOURS PRN
Status: DISCONTINUED | OUTPATIENT
Start: 2022-05-24 | End: 2022-05-25 | Stop reason: HOSPADM

## 2022-05-24 RX ORDER — LABETALOL HYDROCHLORIDE 5 MG/ML
20 INJECTION, SOLUTION INTRAVENOUS
Status: DISCONTINUED | OUTPATIENT
Start: 2022-05-24 | End: 2022-05-25 | Stop reason: HOSPADM

## 2022-05-24 RX ORDER — MAGNESIUM SULFATE HEPTAHYDRATE 500 MG/ML
10 INJECTION, SOLUTION INTRAMUSCULAR; INTRAVENOUS
Status: DISCONTINUED | OUTPATIENT
Start: 2022-05-24 | End: 2022-05-25 | Stop reason: HOSPADM

## 2022-05-24 RX ORDER — MAGNESIUM SULFATE 4 G/50ML
4 INJECTION INTRAVENOUS
Status: DISCONTINUED | OUTPATIENT
Start: 2022-05-24 | End: 2022-05-25 | Stop reason: HOSPADM

## 2022-05-24 RX ORDER — LORAZEPAM 2 MG/ML
2 INJECTION INTRAMUSCULAR
Status: DISCONTINUED | OUTPATIENT
Start: 2022-05-24 | End: 2022-05-25 | Stop reason: HOSPADM

## 2022-05-24 RX ADMIN — NIFEDIPINE 20 MG: 10 CAPSULE ORAL at 20:07

## 2022-05-24 RX ADMIN — OXYCODONE HYDROCHLORIDE 10 MG: 5 TABLET ORAL at 20:53

## 2022-05-24 RX ADMIN — HYDROXYZINE HYDROCHLORIDE 50 MG: 50 TABLET, FILM COATED ORAL at 20:54

## 2022-05-24 RX ADMIN — NIFEDIPINE 10 MG: 10 CAPSULE ORAL at 18:41

## 2022-05-24 NOTE — PROGRESS NOTES
Dr. Rios in to see pt and discuss plan of care. Pt will stay overnight for observation. Awaiting lab results. Nifedipine po given to tx bp.

## 2022-05-24 NOTE — H&P
[facility]    History and Physical       Date of Admission:  2022    History of Present Illness   Shital Tamayo is a 27 year old female  29w 1d  Estimated Date of Delivery: Aug 8, 2022 is calculated from Patient's last menstrual period was 2021. [facility] with [facility] blood pressure she had 2 severe range Bps at home. She has a mild headache. She does get headaches. This headache just started. Denies changes in vision or RUQ pain. + FM.     OB COMPLICATIONS:  IUGR - 2% at 27 wks. Has seen MFM - 2x weekly testing.   GHTN  Migraines - sees neurology  Anxiety  Insomnia - Tylenol PM, Unisom, and Hydroxyzine  Excess weight gain  COVID-19 at 10 weeks    Past Medical History    Past Medical History:   Diagnosis Date     Anxiety      Depressive disorder        Past Surgical History   Past Surgical History:   Procedure Laterality Date     DILATION AND CURETTAGE N/A 3/1/2019    Procedure: DILATION AND CURETTAGE, UTERUS, USING SUCTION;  Surgeon: Becki Nieves MD;  Location: [facility] OR;  Service: Obstetrics       OB History    Para Term  AB Living   2 0 0 0 1 0   SAB IAB Ectopic Multiple Live Births   0 0 0 0 0      # Outcome Date GA Lbr Michele/2nd Weight Sex Delivery Anes PTL Lv   2 Current            1 AB 22              Social History   Social History     Tobacco Use     Smoking status: Never Smoker     Smokeless tobacco: Never Used   Substance Use Topics     Alcohol use: No     Comment: Alcoholic Drinks/day: occasional use     Drug use: No      Family History   No family history on file.     Prior to Admission Medications   Prior to Admission Medications   Prescriptions Last Dose Informant Patient Reported? Taking?   Prenatal Vit-Fe Fumarate-FA (PRENATAL MULTIVITAMIN W/IRON) 27-0.8 MG tablet   Yes No   Sig: Take 1 tablet by mouth daily   acetaminophen (TYLENOL) 325 MG tablet   Yes Yes   Sig: Take 325-650 mg by mouth every 6  hours as needed for headaches   escitalopram (LEXAPRO) 20 MG tablet   Yes Yes   Sig: Take 20 mg by mouth daily   hydrOXYzine (ATARAX) 50 MG tablet   Yes Yes   Sig: Take 50 mg by mouth 3 times daily as needed for anxiety      Facility-Administered Medications: None     Allergies   Allergies   Allergen Reactions     Ondansetron Anxiety       Physical Exam   Vital Signs with Ranges  Temp:  [98.7  F (37.1  C)] 98.7  F (37.1  C)  Pulse:  [90] 90  Resp:  [18] 18  BP: (159-174)/() 159/106    Gen: no acute distress, resting comfortably   CV: acyanotic     Assessment & Plan   Shital Tamayo is a 27 year old female who presents with elevated blood pressure at home  IUP @ 29w 1d .    PLAN:   Admit - see IP orders  GHTN - severe range BP on arrival. Oral Nifedipine order set placed. 10 mg oral Nifedipine given. Labs sent. Will place IV. Discussed preeclampsia and criteria for diagnosis. Will monitor closely.   Rhogam not indicated     Maru Rios MD

## 2022-05-24 NOTE — PROGRESS NOTES
"Pt presents to [facility] for increased blood pressure, states she has a cuff at home and had several readings today 160/100, 170/100 per pt report. Reports increased swelling in hands and feet over the past few days, but states \"it seems better today\"; reports occasional headaches, but not any worse than her normal. Denies visual disturbances and RUQ pain. EFM applied. Freq. Fm. Denies contractions but states feeling occasional cramping, denies currently. No LOF or bleeding. Reflexes wnl, no clonus noted.   Dr. Rios present on unit, informed of information in this note. States she will see pt.  "

## 2022-05-25 ENCOUNTER — HOSPITAL ENCOUNTER (INPATIENT)
Facility: HOSPITAL | Age: 27
LOS: 2 days | Discharge: HOME | End: 2022-05-27
Attending: OBSTETRICS & GYNECOLOGY | Admitting: OBSTETRICS & GYNECOLOGY
Payer: MEDICAID

## 2022-05-25 VITALS
RESPIRATION RATE: 18 BRPM | TEMPERATURE: 98.3 F | SYSTOLIC BLOOD PRESSURE: 135 MMHG | HEART RATE: 90 BPM | DIASTOLIC BLOOD PRESSURE: 88 MMHG

## 2022-05-25 DIAGNOSIS — F41.9 ANXIETY: Primary | ICD-10-CM

## 2022-05-25 DIAGNOSIS — O16.3 ELEVATED BLOOD PRESSURE AFFECTING PREGNANCY IN THIRD TRIMESTER, ANTEPARTUM: ICD-10-CM

## 2022-05-25 PROBLEM — Z34.90 PREGNANCY: Status: ACTIVE | Noted: 2022-05-25

## 2022-05-25 LAB
ALBUMIN MFR UR ELPH: <7 MG/DL
ALT SERPL W P-5'-P-CCNC: 11 U/L (ref 0–45)
AST SERPL W P-5'-P-CCNC: 15 U/L (ref 0–40)
CREAT SERPL-MCNC: 0.68 MG/DL (ref 0.6–1.1)
CREAT UR-MCNC: 50 MG/DL
ERYTHROCYTE [DISTWIDTH] IN BLOOD BY AUTOMATED COUNT: 13.5 % (ref 10–15)
GFR SERPL CREATININE-BSD FRML MDRD: >90 ML/MIN/1.73M2
HCT VFR BLD AUTO: 33.7 % (ref 35–47)
HGB BLD-MCNC: 11.1 G/DL (ref 11.7–15.7)
HOLD SPECIMEN: NORMAL
MCH RBC QN AUTO: 28.9 PG (ref 26.5–33)
MCHC RBC AUTO-ENTMCNC: 32.9 G/DL (ref 31.5–36.5)
MCV RBC AUTO: 88 FL (ref 78–100)
PLATELET # BLD AUTO: 254 10E3/UL (ref 150–450)
PROT/CREAT 24H UR: NORMAL MG/G{CREAT}
RBC # BLD AUTO: 3.84 10E6/UL (ref 3.8–5.2)
SARS-COV-2 RNA RESP QL NAA+PROBE: POSITIVE
WBC # BLD AUTO: 10.8 10E3/UL (ref 4–11)

## 2022-05-25 PROCEDURE — 87653 STREP B DNA AMP PROBE: CPT | Performed by: OBSTETRICS & GYNECOLOGY

## 2022-05-25 PROCEDURE — 250N000013 HC RX MED GY IP 250 OP 250 PS 637: Performed by: OBSTETRICS & GYNECOLOGY

## 2022-05-25 PROCEDURE — 120N000001 HC R&B MED SURG/OB

## 2022-05-25 PROCEDURE — G0378 HOSPITAL OBSERVATION PER HR: HCPCS

## 2022-05-25 PROCEDURE — 36415 COLL VENOUS BLD VENIPUNCTURE: CPT | Performed by: OBSTETRICS & GYNECOLOGY

## 2022-05-25 PROCEDURE — 250N000011 HC RX IP 250 OP 636: Performed by: OBSTETRICS & GYNECOLOGY

## 2022-05-25 PROCEDURE — 85027 COMPLETE CBC AUTOMATED: CPT | Performed by: OBSTETRICS & GYNECOLOGY

## 2022-05-25 PROCEDURE — 258N000003 HC RX IP 258 OP 636: Performed by: OBSTETRICS & GYNECOLOGY

## 2022-05-25 PROCEDURE — 84156 ASSAY OF PROTEIN URINE: CPT | Performed by: OBSTETRICS & GYNECOLOGY

## 2022-05-25 PROCEDURE — 84460 ALANINE AMINO (ALT) (SGPT): CPT | Performed by: OBSTETRICS & GYNECOLOGY

## 2022-05-25 PROCEDURE — 82565 ASSAY OF CREATININE: CPT | Performed by: OBSTETRICS & GYNECOLOGY

## 2022-05-25 PROCEDURE — 87635 SARS-COV-2 COVID-19 AMP PRB: CPT | Performed by: OBSTETRICS & GYNECOLOGY

## 2022-05-25 PROCEDURE — 84450 TRANSFERASE (AST) (SGOT): CPT | Performed by: OBSTETRICS & GYNECOLOGY

## 2022-05-25 PROCEDURE — 999N000127 HC STATISTIC PERIPHERAL IV START W US GUIDANCE

## 2022-05-25 RX ORDER — NALOXONE HYDROCHLORIDE 0.4 MG/ML
0.2 INJECTION, SOLUTION INTRAMUSCULAR; INTRAVENOUS; SUBCUTANEOUS
Status: DISCONTINUED | OUTPATIENT
Start: 2022-05-25 | End: 2022-05-27 | Stop reason: HOSPADM

## 2022-05-25 RX ORDER — NALOXONE HYDROCHLORIDE 0.4 MG/ML
0.4 INJECTION, SOLUTION INTRAMUSCULAR; INTRAVENOUS; SUBCUTANEOUS
Status: DISCONTINUED | OUTPATIENT
Start: 2022-05-25 | End: 2022-05-27 | Stop reason: HOSPADM

## 2022-05-25 RX ORDER — OXYCODONE AND ACETAMINOPHEN 10; 325 MG/1; MG/1
1 TABLET ORAL EVERY 6 HOURS PRN
Status: DISCONTINUED | OUTPATIENT
Start: 2022-05-25 | End: 2022-05-25 | Stop reason: ALTCHOICE

## 2022-05-25 RX ORDER — OXYCODONE HYDROCHLORIDE 5 MG/1
5 TABLET ORAL EVERY 6 HOURS PRN
Status: DISCONTINUED | OUTPATIENT
Start: 2022-05-25 | End: 2022-05-27 | Stop reason: HOSPADM

## 2022-05-25 RX ORDER — MAGNESIUM SULFATE HEPTAHYDRATE 500 MG/ML
10 INJECTION, SOLUTION INTRAMUSCULAR; INTRAVENOUS
Status: DISCONTINUED | OUTPATIENT
Start: 2022-05-25 | End: 2022-05-27 | Stop reason: HOSPADM

## 2022-05-25 RX ORDER — MAGNESIUM SULFATE 4 G/50ML
4 INJECTION INTRAVENOUS ONCE
Status: COMPLETED | OUTPATIENT
Start: 2022-05-25 | End: 2022-05-25

## 2022-05-25 RX ORDER — OXYCODONE AND ACETAMINOPHEN 5; 325 MG/1; MG/1
1 TABLET ORAL ONCE
Status: COMPLETED | OUTPATIENT
Start: 2022-05-25 | End: 2022-05-25

## 2022-05-25 RX ORDER — HYDROXYZINE HYDROCHLORIDE 50 MG/1
50 TABLET, FILM COATED ORAL 3 TIMES DAILY PRN
Status: DISCONTINUED | OUTPATIENT
Start: 2022-05-25 | End: 2022-05-25

## 2022-05-25 RX ORDER — HYDRALAZINE HYDROCHLORIDE 20 MG/ML
10 INJECTION INTRAMUSCULAR; INTRAVENOUS
Status: DISCONTINUED | OUTPATIENT
Start: 2022-05-25 | End: 2022-05-27 | Stop reason: HOSPADM

## 2022-05-25 RX ORDER — SODIUM CHLORIDE, SODIUM LACTATE, POTASSIUM CHLORIDE, CALCIUM CHLORIDE 600; 310; 30; 20 MG/100ML; MG/100ML; MG/100ML; MG/100ML
10-125 INJECTION, SOLUTION INTRAVENOUS CONTINUOUS
Status: DISCONTINUED | OUTPATIENT
Start: 2022-05-25 | End: 2022-05-27 | Stop reason: HOSPADM

## 2022-05-25 RX ORDER — OXYCODONE AND ACETAMINOPHEN 5; 325 MG/1; MG/1
1-2 TABLET ORAL EVERY 4 HOURS PRN
Status: DISCONTINUED | OUTPATIENT
Start: 2022-05-25 | End: 2022-05-25 | Stop reason: HOSPADM

## 2022-05-25 RX ORDER — CALCIUM GLUCONATE 94 MG/ML
1 INJECTION, SOLUTION INTRAVENOUS
Status: DISCONTINUED | OUTPATIENT
Start: 2022-05-25 | End: 2022-05-27 | Stop reason: HOSPADM

## 2022-05-25 RX ORDER — OXYCODONE AND ACETAMINOPHEN 5; 325 MG/1; MG/1
1 TABLET ORAL EVERY 6 HOURS PRN
Status: DISCONTINUED | OUTPATIENT
Start: 2022-05-25 | End: 2022-05-27 | Stop reason: HOSPADM

## 2022-05-25 RX ORDER — MAGNESIUM SULFATE IN WATER 40 MG/ML
2 INJECTION, SOLUTION INTRAVENOUS CONTINUOUS
Status: DISCONTINUED | OUTPATIENT
Start: 2022-05-25 | End: 2022-05-27 | Stop reason: HOSPADM

## 2022-05-25 RX ORDER — LABETALOL HYDROCHLORIDE 5 MG/ML
20-80 INJECTION, SOLUTION INTRAVENOUS EVERY 10 MIN PRN
Status: DISCONTINUED | OUTPATIENT
Start: 2022-05-25 | End: 2022-05-27 | Stop reason: HOSPADM

## 2022-05-25 RX ORDER — BUTALBITAL, ACETAMINOPHEN AND CAFFEINE 50; 325; 40 MG/1; MG/1; MG/1
1 TABLET ORAL ONCE
Status: DISCONTINUED | OUTPATIENT
Start: 2022-05-25 | End: 2022-05-25 | Stop reason: CLARIF

## 2022-05-25 RX ORDER — MAGNESIUM SULFATE HEPTAHYDRATE 40 MG/ML
2 INJECTION, SOLUTION INTRAVENOUS
Status: DISCONTINUED | OUTPATIENT
Start: 2022-05-25 | End: 2022-05-27 | Stop reason: HOSPADM

## 2022-05-25 RX ORDER — MAGNESIUM SULFATE 4 G/50ML
4 INJECTION INTRAVENOUS
Status: DISCONTINUED | OUTPATIENT
Start: 2022-05-25 | End: 2022-05-27 | Stop reason: HOSPADM

## 2022-05-25 RX ORDER — HYDROMORPHONE HCL IN WATER/PF 6 MG/30 ML
0.5 PATIENT CONTROLLED ANALGESIA SYRINGE INTRAVENOUS
Status: DISCONTINUED | OUTPATIENT
Start: 2022-05-25 | End: 2022-05-27 | Stop reason: HOSPADM

## 2022-05-25 RX ORDER — OXYCODONE AND ACETAMINOPHEN 5; 325 MG/1; MG/1
1 TABLET ORAL EVERY 4 HOURS PRN
Status: DISCONTINUED | OUTPATIENT
Start: 2022-05-25 | End: 2022-05-25

## 2022-05-25 RX ORDER — LORAZEPAM 2 MG/ML
2 INJECTION INTRAMUSCULAR
Status: DISCONTINUED | OUTPATIENT
Start: 2022-05-25 | End: 2022-05-27 | Stop reason: HOSPADM

## 2022-05-25 RX ORDER — HYDROXYZINE HYDROCHLORIDE 50 MG/1
100 TABLET, FILM COATED ORAL 3 TIMES DAILY PRN
Status: DISCONTINUED | OUTPATIENT
Start: 2022-05-25 | End: 2022-05-27 | Stop reason: HOSPADM

## 2022-05-25 RX ADMIN — MAGNESIUM SULFATE HEPTAHYDRATE 4 G: 4 INJECTION, SOLUTION INTRAVENOUS at 16:00

## 2022-05-25 RX ADMIN — OXYCODONE HYDROCHLORIDE AND ACETAMINOPHEN 1 TABLET: 5; 325 TABLET ORAL at 15:37

## 2022-05-25 RX ADMIN — OXYCODONE HYDROCHLORIDE 5 MG: 5 TABLET ORAL at 21:33

## 2022-05-25 RX ADMIN — OXYCODONE HYDROCHLORIDE AND ACETAMINOPHEN 1 TABLET: 5; 325 TABLET ORAL at 00:28

## 2022-05-25 RX ADMIN — HYDROXYZINE HYDROCHLORIDE 100 MG: 50 TABLET, FILM COATED ORAL at 21:33

## 2022-05-25 RX ADMIN — OXYCODONE HYDROCHLORIDE AND ACETAMINOPHEN 1 TABLET: 5; 325 TABLET ORAL at 21:33

## 2022-05-25 RX ADMIN — PROCHLORPERAZINE EDISYLATE 10 MG: 5 INJECTION INTRAMUSCULAR; INTRAVENOUS at 18:03

## 2022-05-25 RX ADMIN — SODIUM CHLORIDE, POTASSIUM CHLORIDE, SODIUM LACTATE AND CALCIUM CHLORIDE 75 ML/HR: 600; 310; 30; 20 INJECTION, SOLUTION INTRAVENOUS at 15:59

## 2022-05-25 RX ADMIN — HYDROXYZINE HYDROCHLORIDE 50 MG: 50 TABLET, FILM COATED ORAL at 17:08

## 2022-05-25 RX ADMIN — HYDROMORPHONE HYDROCHLORIDE 0.5 MG: 0.2 INJECTION, SOLUTION INTRAMUSCULAR; INTRAVENOUS; SUBCUTANEOUS at 18:03

## 2022-05-25 RX ADMIN — OXYCODONE HYDROCHLORIDE AND ACETAMINOPHEN 2 TABLET: 5; 325 TABLET ORAL at 08:26

## 2022-05-25 RX ADMIN — MAGNESIUM SULFATE IN WATER 2 G/HR: 40 INJECTION, SOLUTION INTRAVENOUS at 16:39

## 2022-05-25 ASSESSMENT — ACTIVITIES OF DAILY LIVING (ADL)
DOING_ERRANDS_INDEPENDENTLY_DIFFICULTY: NO
DIFFICULTY_EATING/SWALLOWING: NO
ADLS_ACUITY_SCORE: 18
CONCENTRATING,_REMEMBERING_OR_MAKING_DECISIONS_DIFFICULTY: NO
CHANGE_IN_FUNCTIONAL_STATUS_SINCE_ONSET_OF_CURRENT_ILLNESS/INJURY: NO
ADLS_ACUITY_SCORE: 18
WEAR_GLASSES_OR_BLIND: NO
DRESSING/BATHING_DIFFICULTY: NO
FALL_HISTORY_WITHIN_LAST_SIX_MONTHS: NO
TOILETING_ISSUES: NO
WALKING_OR_CLIMBING_STAIRS_DIFFICULTY: NO

## 2022-05-25 NOTE — PROGRESS NOTES
OB ANTEPARTUM PROGRESS NOTE    IUP at 29w2d, admitted for headache- chronic- and elevated BP's.     SUBJECTIVE:  Patient feels about the same. She has no new complaints. Patient states the baby is active, and is completing fetal kick counts. States that she is experiencing no contractions.  Patient denies headache, change in vision or upper abdominal pain. She denies vaginal bleeding. Denies leaking of fluids. Denies change in discharge.     OBJECTIVE:  BP (!) 135/95   Pulse 90   Temp 98.3  F (36.8  C) (Oral)   Resp 18   LMP 11/01/2021    Abd: gravid  NST: category 1  TOCO: none    LABS:  Reviewed      MEDICATIONS:    Current Facility-Administered Medications:      escitalopram (LEXAPRO) tablet 20 mg, 20 mg, Oral, Daily, Maru Rios MD     hydrOXYzine (ATARAX) tablet 50 mg, 50 mg, Oral, Q6H PRN, Maru Rios MD, 50 mg at 05/24/22 2054     labetalol (NORMODYNE/TRANDATE) injection 20 mg, 20 mg, Intravenous, Once PRN, Maru Rios MD     lactated ringers infusion,  mL/hr, Intravenous, Continuous, Maru Rios MD     lidocaine (PF) (XYLOCAINE) 1 % injection, , , ,      LORazepam (ATIVAN) injection 2 mg, 2 mg, Intravenous, Q3 Min PRN, Maru Rios MD     magnesium sulfate 2 g in water intermittent infusion, 2 g, Intravenous, Once PRN seizures, Maru Rios MD     magnesium sulfate 4 g in 50 mL sterile water (premade), 4 g, Intravenous, Once PRN seizures, Maru Rios MD     magnesium sulfate injection 10 g, 10 g, Intramuscular, Once PRN, Maru Rios MD     naloxone (NARCAN) injection 0.2 mg, 0.2 mg, Intravenous, Q2 Min PRN **OR** naloxone (NARCAN) injection 0.4 mg, 0.4 mg, Intravenous, Q2 Min PRN **OR** naloxone (NARCAN) injection 0.2 mg, 0.2 mg, Intramuscular, Q2 Min PRN **OR** naloxone (NARCAN) injection 0.4 mg, 0.4 mg, Intramuscular, Q2 Min PRN, Becki Nieves MD     NIFEdipine (PROCARDIA) capsule 10-20 mg, 10-20 mg,  Oral, Q20 Min PRN, Maru Rios MD, 20 mg at 22     oxyCODONE (ROXICODONE) tablet 10 mg, 10 mg, Oral, Q4H PRN, Maru Rios MD, 10 mg at 22     oxyCODONE-acetaminophen (PERCOCET) 5-325 MG per tablet 1-2 tablet, 1-2 tablet, Oral, Q4H PRN, Barbara Hendrickson MD    ASSESSMENT:  27 year old  at 29w2d   Gestational HTN       PLAN:  Collect GBS   GBS culture today  OK to discharge with outpt follow-up with Dr. Nieves  Pre-E prec reviewed-  parameters reviewed 160/110 to call if persistent      Barbara Hendrickson DO, FACOG  2022 8:13 AM

## 2022-05-25 NOTE — PROGRESS NOTES
1700 - Pt in excruciating pain. States headache is rating 9/10 even after medications. Vitals stable. Lights dimmed, ice pack for head. Dr Hendrickson in unit and notified of pt status. See orders for pain medication.

## 2022-05-25 NOTE — PROGRESS NOTES
Progress Note    Still has a headache.   She does get frequent headaches/migraines.   She takes percocet 1-2x per week.   She had oxycodone but still has a headache.     Bps reviewed. Non severe.     Cr 0.63  AST 15  ALT 18  H/H: 12.4/37.3  Plts 286    A/P: 26 yo  at 29w 2d with elevated BP and headache  -- Elevated Blood pressure. Discussed elevated BP. She did require 2 doses of Nifedipine. Bps currently non severe. Discussed GHTN and preeclampsia. She does have a headache. I discussed her symptoms, labs and possibility of GHTN vs. Preeclampsia. Will treat with 1 dose of percocet. Discussed if headache does not resolve with her typical treatment for migraines or if she has more severe range Bps starting magnesium sulfate. Discussed risks of seizure and stroke with preeclampsia. Will monitor closely. Questions answered.     Maru Rios MD  (P) [phone]

## 2022-05-25 NOTE — PLAN OF CARE
Problem: Hypertensive Disorders in Pregnancy  Goal: Maternal-Fetal Stabilization  Outcome: Ongoing, Progressing   Pt BP remains stable throughout the night. After receiving percocet pt states that her headache is gone. Pt denies vision changes and RUQ pain. No clonus reflexes were +3. FHR was hard to keep on the monitor, No contractions tracing and pt denies feeling any. Will continue to monitor.

## 2022-05-25 NOTE — DISCHARGE INSTRUCTIONS
Discharge Instruction for Undelivered Patients      You were seen for:  Blood Pressure Monitoring and rule out pre-eclampsia  We Consulted: Dr. Hendrickson    You had (Test or Medicine):Lab work, blood pressure monitoring     Diet:   Drink 8 to 12 glasses of liquids (milk, juice, water) every day.  You may eat meals and snacks.     Activity:  Count fetal kicks everyday (see handout)     Call your provider if you notice:  Swelling in your face or increased swelling in your hands or legs.  Headaches that are not relieved by Tylenol (acetaminophen).  Changes in your vision (blurring: seeing spots or stars.)  Nausea (sick to your stomach) and vomiting (throwing up).   Weight gain of 5 pounds or more per week.  Heartburn that doesn't go away.  Signs of bladder infection: pain when you urinate (use the toilet), need to go more often and more urgently.  The bag of west (rupture of membranes) breaks, or you notice leaking in your underwear.  Bright red blood in your underwear.  Abdominal (lower belly) or stomach pain.  Contractions (tightening) less than 10 minutes apart and getting stronger.  *If less than 34 weeks: Contractions (tightening) more than 6 times in one hour.  Increase or change in vaginal discharge (note the color and amount)      Follow-up:  As scheduled in the clinic

## 2022-05-26 ENCOUNTER — ANCILLARY PROCEDURE (OUTPATIENT)
Dept: ULTRASOUND IMAGING | Facility: HOSPITAL | Age: 27
End: 2022-05-26
Attending: OBSTETRICS & GYNECOLOGY
Payer: MEDICAID

## 2022-05-26 DIAGNOSIS — O98.513 COVID-19 AFFECTING PREGNANCY IN THIRD TRIMESTER: ICD-10-CM

## 2022-05-26 DIAGNOSIS — O14.90 PRE-ECLAMPSIA AFFECTING PREGNANCY, ANTEPARTUM: ICD-10-CM

## 2022-05-26 DIAGNOSIS — U07.1 COVID-19 AFFECTING PREGNANCY IN THIRD TRIMESTER: ICD-10-CM

## 2022-05-26 DIAGNOSIS — O36.5990 PREGNANCY AFFECTED BY FETAL GROWTH RESTRICTION: Primary | ICD-10-CM

## 2022-05-26 DIAGNOSIS — O36.5990 PREGNANCY AFFECTED BY FETAL GROWTH RESTRICTION: ICD-10-CM

## 2022-05-26 LAB — GP B STREP DNA SPEC QL NAA+PROBE: POSITIVE

## 2022-05-26 PROCEDURE — 76819 FETAL BIOPHYS PROFIL W/O NST: CPT

## 2022-05-26 PROCEDURE — 120N000001 HC R&B MED SURG/OB

## 2022-05-26 PROCEDURE — 250N000011 HC RX IP 250 OP 636: Performed by: OBSTETRICS & GYNECOLOGY

## 2022-05-26 PROCEDURE — 250N000013 HC RX MED GY IP 250 OP 250 PS 637: Performed by: OBSTETRICS & GYNECOLOGY

## 2022-05-26 PROCEDURE — 76820 UMBILICAL ARTERY ECHO: CPT | Mod: 26 | Performed by: OBSTETRICS & GYNECOLOGY

## 2022-05-26 PROCEDURE — 258N000003 HC RX IP 258 OP 636: Performed by: OBSTETRICS & GYNECOLOGY

## 2022-05-26 PROCEDURE — 76819 FETAL BIOPHYS PROFIL W/O NST: CPT | Mod: 26 | Performed by: OBSTETRICS & GYNECOLOGY

## 2022-05-26 RX ORDER — HYDROXYZINE HYDROCHLORIDE 25 MG/1
25 TABLET, FILM COATED ORAL 3 TIMES DAILY PRN
Status: DISCONTINUED | OUTPATIENT
Start: 2022-05-26 | End: 2022-05-27 | Stop reason: HOSPADM

## 2022-05-26 RX ADMIN — OXYCODONE HYDROCHLORIDE AND ACETAMINOPHEN 1 TABLET: 5; 325 TABLET ORAL at 14:12

## 2022-05-26 RX ADMIN — HYDROXYZINE HYDROCHLORIDE 100 MG: 50 TABLET, FILM COATED ORAL at 16:05

## 2022-05-26 RX ADMIN — PROCHLORPERAZINE EDISYLATE 10 MG: 5 INJECTION INTRAMUSCULAR; INTRAVENOUS at 14:12

## 2022-05-26 RX ADMIN — HYDROXYZINE HYDROCHLORIDE 100 MG: 50 TABLET, FILM COATED ORAL at 21:32

## 2022-05-26 RX ADMIN — OXYCODONE HYDROCHLORIDE 5 MG: 5 TABLET ORAL at 14:12

## 2022-05-26 RX ADMIN — OXYCODONE HYDROCHLORIDE AND ACETAMINOPHEN 1 TABLET: 5; 325 TABLET ORAL at 08:24

## 2022-05-26 RX ADMIN — OXYCODONE HYDROCHLORIDE 5 MG: 5 TABLET ORAL at 08:25

## 2022-05-26 RX ADMIN — OXYCODONE HYDROCHLORIDE AND ACETAMINOPHEN 1 TABLET: 5; 325 TABLET ORAL at 21:33

## 2022-05-26 RX ADMIN — HYDROXYZINE HYDROCHLORIDE 100 MG: 50 TABLET, FILM COATED ORAL at 08:24

## 2022-05-26 RX ADMIN — OXYCODONE HYDROCHLORIDE 5 MG: 5 TABLET ORAL at 21:32

## 2022-05-26 RX ADMIN — SODIUM CHLORIDE, POTASSIUM CHLORIDE, SODIUM LACTATE AND CALCIUM CHLORIDE 75 ML/HR: 600; 310; 30; 20 INJECTION, SOLUTION INTRAVENOUS at 03:30

## 2022-05-26 RX ADMIN — PROCHLORPERAZINE EDISYLATE 10 MG: 5 INJECTION INTRAMUSCULAR; INTRAVENOUS at 08:24

## 2022-05-26 ASSESSMENT — ACTIVITIES OF DAILY LIVING (ADL)
ADLS_ACUITY_SCORE: 18

## 2022-05-26 NOTE — PROGRESS NOTES
FHR continues to be difficult to trace, RN at bedside frequently adjusting. Frequent fetal movement heard while attempting to get continuous tracing. Pt complains of headache that was improved with medication regiment. Otherwise, asymptomatic for preeclampsia. VSS.

## 2022-05-26 NOTE — PLAN OF CARE
Problem: Hypertensive Disorders in Pregnancy  Goal: Maternal-Fetal Stabilization  Outcome: Ongoing, Progressing     Problem: Pain Acute  Goal: Acceptable Pain Control and Functional Ability  Outcome: Ongoing, Not Progressing     Bps have been WDL. Headache persists with minimal relief despite interventions.Peggy Wren RN

## 2022-05-26 NOTE — PROGRESS NOTES
Pt called RN into room due to beeping IV pump. Have been struggling with pump tonight. Inquired how pt FORRESTER was and she said it was a little better and rated pain about 5/10. Offered percocet and/or Percocet per orders and pt declined at this time.Peggy Wren RN

## 2022-05-26 NOTE — UTILIZATION REVIEW
Admission Status; Secondary Review Determination       Under the authority of the Utilization Management Committee, the utilization review process indicated a secondary review on the above patient. The review outcome is based on review of the medical records, discussions with staff, and applying clinical experience noted on the date of the review.     (x) Inpatient Status Appropriate - This patient's medical care is consistent with medical management for inpatient care and reasonable inpatient medical practice.     RATIONALE FOR DETERMINATION     Ms. Tamayo is a 26 yo female currently 29 wks+ gestation who presents to the ED with headache, severe BP elevation at home and leukocytosis.  She was found to be COVID+.  She had elevated BP on day of admission and received IV magnesium dose and then was started on a IV mag infusion.  She continues on IVF and IV magnesium today.  She is requiring ongoing inpatient treatment and monitoring for clinical concerns of pre-eclampsia as well as COVID-related HA.        At the time of admission with the information available to the attending physician more than 2 nights Hospital complex care was anticipated, based on patient risk of adverse outcome if treated as outpatient and complex care required. Inpatient admission is appropriate based on the Medicare guidelines.       The information on this document is developed by the utilization review team in order for the business office to ensure compliance. This only denotes the appropriateness of proper admission status and does not reflect the quality of care rendered.   The definitions of Inpatient Status and Observation Status used in making the determination above are those provided in the CMS Coverage Manual, Chapter 1 and Chapter 6, section 70.4.         Sincerely,     Melly Quiroga, DO  Utilization Review  Physician Advisor  [facility].

## 2022-05-26 NOTE — PROGRESS NOTES
RN into room for assessment. Pt lying still in bed. Eyes closed when RN bedside. Eyes open to verbal stimuli. Consented to assessment. Adjusted efm when pt turned to left tilt to place BP cuff. Pt asking for medicine to sleep. informed her that we use hydroxazine for sleep and she received that at 2130 with Percocet and oxycodone. Pt stated she is restless and cannot sleep. States HA is a little better, rating pain as 7-8/10 now. Informed pt that Dr. Burton is in surgery at this time, but will discuss pt status with MD when she is available. Pt accepted offer.Peggy Wren RN

## 2022-05-26 NOTE — PROGRESS NOTES
Pt called RN into room. Feeling more pain as well as extreme anxiousness and wanting something for it. Took Lexapro today. Had sudden onset of nausea and vomited over 60 ounces of fluid and food. Informed pt to not eat or drink at this time.Peggy Wren RN

## 2022-05-26 NOTE — PROGRESS NOTES
Notified , while she is on OR, of pt wanting sleep meds, feeling restless and minimal relief in HA. RN from last night stated that she gave her an ice pack for her head and after meds given, pt slept all night.   Ice pack brought in and given to pt. She accepted it and placed it to her neck. Pt now lying in bed with head at HOB and in right lateral.   Adjusted IV as continues to alarm downstream occlusion despite interventions. .Peggy Wren RN

## 2022-05-26 NOTE — PROGRESS NOTES
fhts not traced for 1.5hours and pt had not called out. RN in room to adjust efm and pt sleeping in right lateral. Startled awake with verbal stimuli and touch. efm adjusted and IV attended to again as tubing kinks easily where placed and taped. Pt did not offer any complaints of pain or restlessness at this time.Peggy Wren RN

## 2022-05-26 NOTE — PROGRESS NOTES
Spoke with Dr. Burton re: pt anxiety, pain, vomiting and difficulty tracing fhts. Discussion with MD to trace fhts as able when in doing cares. Goal for pt is to rest/sleep tonight and manage pain. Orders reviewed for percocet/oxycodone and to increase Atarax to 100mg PO. Informed MD that writer told pt not to eat or drink at this time and will give po meds at 2130. MD in agreement.  RN into room to relay conversation with pt and SO. In agreement with plan for meds at 2130 and tracing fhts as able. RN spent >10 min trying to get fhts with pt in right lateral. Requested pt to turn to her back to be able to trace fhts and pt complied. Able to get fhts 130's in this position. Pt then turned to her side and fhts lost. RN attempted to trace fhts in this position again, but was unable. Pt asking if she needed the IV all night. Discussed Magnesium for potential pre-eclampsia, given her high Bps recently and HA. Discussed that we cannot differentiate at this time if she has HA from Pre-E, GHTN , COVID, Migraine or Anxiety.   Reiterated that are goal is to decrease pain with HA and may not be able to elminate it for right now. Also discussed trying to streamline medications to help HA and not have too many different meds or times as this can cause a cycle of not helping. Pt and SO accepted discussion.  Pt with frequent, dry cough. .Peggy Wren RN

## 2022-05-26 NOTE — PROGRESS NOTES
Assumed care of pt. RN bedside to adjust monitors and assess pt. Pt in right lateral position, lying still and awake. Pt consented to cares. efm repositioned. Reflexes done. +2  left patellar reflex and +3 right patellar reflex. No clonus.   Pt rating HA as 8/10. Discussed pain level goal with her and pt stated that  said that if  FORRESTER is not better by 2130, she could have more Percocet. RN agreed with plan and further discussed pt pain goal and since she suffers from Migraines, only she knows what her tolerable level is. Discussed that it may not be possible to eliminate headache altogether right now and wanting to find out her acceptable level. Pt could not give RN a pain level goal.   Enc pt to rest and discussed that RN will only come in if needing to adjust efm or if pt calls out as to allow pt to rest. Pt in agreement. Lights low. No audible stimulus. SO bedside. .Peggy Wren, RN

## 2022-05-26 NOTE — PROGRESS NOTES
rn into room to adjust efm. Pt now in fetal position with blanket over her head. Whimpering. Notified pt and SO that writer will be updating Dr. Burton on status.Peggy Wren RN

## 2022-05-26 NOTE — PROGRESS NOTES
IV infiltrated. MD notified. Orders received to discontinue magnesium 2 hours early due to this. NST q shift now.

## 2022-05-26 NOTE — PROGRESS NOTES
RN in room with pt meds and linens for SO. Pt in left lateral position and in relaxed position. Tracing fhts with pt in left lateral.   meds given per orders. Removed BP cuff and will do with q4 assessments through the night to enc rest. Pt will use call light to alert RN with needs.Peggy Wren RN

## 2022-05-26 NOTE — PROGRESS NOTES
Dr. Burton on unit. Notified that pt was sound asleep when RN went into room to adjust monitors, able to trace fhts for almost 30 min. MD reviewed strip and said not to go in to adjust efm and tracing was fine. Next Magnesium assessment is due at 0330 when pt can have another dose of percocet/oxycodone.Peggy Wren RN

## 2022-05-26 NOTE — PLAN OF CARE
Problem: Hypertensive Disorders in Pregnancy  Goal: Maternal-Fetal Stabilization  5/26/2022 0540 by Peggy Wren RN  Outcome: Ongoing, Progressing     Problem: Pain Acute  Goal: Acceptable Pain Control and Functional Ability  5/26/2022 0540 by Peggy Wren RN  Outcome: Ongoing, Progressing  Intervention: Prevent or Manage Pain  Recent Flowsheet Documentation  Taken 5/25/2022 2343 by Peggy Wren RN  Sleep/Rest Enhancement:   awakenings minimized   noise level reduced   room darkened   relaxation techniques promoted     Pt has been able to sleep. Last dose of Percocet/oxycodone combo was at 2130. Rating HA as 5/10 at 0330.  IV patent. Voiding large amounts. No cxns traced or reported by pt. Fhts tracing sporadically with baseline 140, minimal variabilty, occass 10x10 accel and no decels. Tracing appropriate for gestational age. .Peggy Wren RN

## 2022-05-26 NOTE — H&P
"[facility] LABOR & DELIVERY   HISTORY AND PHYSICAL UPDATE ADMISSION EXAM      NAME:Shital Tamayo  : 1995   MRN: [id number]   GESTATIONAL AGE: 29w2d    ADMISSION DATE: 2022     PCP: Dr. Becki Nieves    History: This is a 27-year-old  2 para 0-0-1-0 at 29 weeks and 2 days.  Patient's pregnancy has been complicated by: Severe IUGR, migraine headaches, elevated blood pressures.  Patient was initially admitted on  with a severe range blood pressure and a headache.  She did receive nifedipine, hydroxyzine, and Percocet overnight, and the headache resolved and blood pressures normalized.  She was subsequently discharged to home.  The working diagnosis at that time was gestational hypertension migraine headaches.    Patient returned to clinic on the morning of  with elevated blood pressure and persistent headache.  She was then readmitted for work-up.  HELLP labs were negative.  Patient had mildly elevated blood pressures, but was not found to have any in the severe range.  Magnesium was initiated secondary to concern for preeclampsia as her severe headache was persistent despite a multitude of medications for pain.  In the early evening, the patient was found to be positive for COVID.    OBSTETRIC HISTORY:    OB History    Para Term  AB Living   2 0 0 0 1 0   SAB IAB Ectopic Multiple Live Births   0 0 0 0 0      # Outcome Date GA Lbr Michele/2nd Weight Sex Delivery Anes PTL Lv   2 Current            1 AB 19               EDC: Estimated Date of Delivery: Aug 8, 2022    EGA: 29w2d      Prenatal Complications   Patient Active Problem List   Diagnosis     Depression     Encounter for triage in pregnant patient     Elevated blood pressure affecting pregnancy in third trimester, antepartum       Exam:      /64   Pulse 100   Temp 98  F (36.7  C) (Oral)   Resp 18   Ht 1.676 m (5' 6\")   Wt 74.8 kg (165 lb)   LMP 2021   SpO2 98%   BMI 26.63 " kg/m     Temp:  [98  F (36.7  C)-98.8  F (37.1  C)] 98  F (36.7  C)  Pulse:  [100-113] 100  Resp:  [18-22] 18  BP: ()/() 106/64  SpO2:  [91 %-100 %] 98 %      Fetal heart Rate Tracing: appropriate for gestational age  Contractions: none    HEENT  negative  Heart       Lungs      Abdomen   Abdomen soft, non-tender. BS normal. No masses, organomegaly  Extremities  Normal, Warm, No cyanosis, no clubbing, No edema and nontender  Vaginal exam: deferred  Breech by ultrasound with Dr. Nieves today        Pertinent Studies:     CBC: Recent Labs   Lab 22  1527 22  1859   WBC 10.8 12.7*   HGB 11.1* 12.4   HCT 33.7* 37.3   MCV 88 87    286     Chemistry:   Recent Labs   Lab 22  1527 22  1859   CR 0.68 0.63   GFRESTIMATED >90 >90   AST 15 15   ALT 11 18       Assessment: 27-year-old  2 para 0-0-1-0 at 29 weeks and 2 days  Complicated clinical picture involving IUGR, migraine headaches, mildly elevated blood pressures, and now known to be COVID-positive.  Breech presentation.    Plan: Observation  Argument could be made for severe preeclampsia based on a few severe range pressures and persistent headache in the face of IUGR.  However, this is a complicated picture given patient's history of severe and persistent headaches.  Additionally, patient was recently diagnosed with COVID which could also contribute to her persistent headache.  Ultimately, the decision was made to continue magnesium overnight and allow the patient to rest.  I am less inclined to deliver a 29-week gestation pregnancy in the face of normal Dopplers on ultrasound.  Especially since blood pressures appear to be labile.  Ideally, we would maintain pregnancy until 34 weeks.  Case discussed with Dr. Nieves.  Case discussed at length with the patient and her significant other.  They are in agreement with our current plan.  Reconsideration for delivery if: Nonreassuring fetal status, persistently elevated severe  range blood pressures, or abnormal HELLP labs.    Prenatal record reviewed.    Donita Burton DO on 5/25/2022 at 7:46 PM

## 2022-05-27 ENCOUNTER — HOSPITAL ENCOUNTER (INPATIENT)
Facility: HOSPITAL | Age: 27
End: 2022-05-27
Attending: OBSTETRICS & GYNECOLOGY | Admitting: OBSTETRICS & GYNECOLOGY
Payer: MEDICAID

## 2022-05-27 VITALS
RESPIRATION RATE: 18 BRPM | TEMPERATURE: 98.2 F | BODY MASS INDEX: 26.52 KG/M2 | WEIGHT: 165 LBS | HEIGHT: 66 IN | OXYGEN SATURATION: 96 % | HEART RATE: 105 BPM | SYSTOLIC BLOOD PRESSURE: 127 MMHG | DIASTOLIC BLOOD PRESSURE: 73 MMHG

## 2022-05-27 PROCEDURE — 250N000013 HC RX MED GY IP 250 OP 250 PS 637: Performed by: OBSTETRICS & GYNECOLOGY

## 2022-05-27 PROCEDURE — G0463 HOSPITAL OUTPT CLINIC VISIT: HCPCS

## 2022-05-27 RX ORDER — HYDROXYZINE HYDROCHLORIDE 50 MG/1
100 TABLET, FILM COATED ORAL 3 TIMES DAILY PRN
Qty: 40 TABLET | Refills: 0 | Status: SHIPPED | OUTPATIENT
Start: 2022-05-27

## 2022-05-27 RX ORDER — LABETALOL 100 MG/1
100 TABLET, FILM COATED ORAL EVERY 12 HOURS
Qty: 28 TABLET | Refills: 0 | Status: SHIPPED | OUTPATIENT
Start: 2022-05-27 | End: 2023-05-17

## 2022-05-27 RX ORDER — LABETALOL 100 MG/1
100 TABLET, FILM COATED ORAL EVERY 12 HOURS SCHEDULED
Status: DISCONTINUED | OUTPATIENT
Start: 2022-05-27 | End: 2022-05-27 | Stop reason: HOSPADM

## 2022-05-27 RX ADMIN — OXYCODONE HYDROCHLORIDE 5 MG: 5 TABLET ORAL at 04:25

## 2022-05-27 RX ADMIN — HYDROXYZINE HYDROCHLORIDE 100 MG: 50 TABLET, FILM COATED ORAL at 04:25

## 2022-05-27 RX ADMIN — OXYCODONE HYDROCHLORIDE 5 MG: 5 TABLET ORAL at 11:04

## 2022-05-27 RX ADMIN — HYDROXYZINE HYDROCHLORIDE 100 MG: 50 TABLET, FILM COATED ORAL at 11:05

## 2022-05-27 RX ADMIN — OXYCODONE HYDROCHLORIDE AND ACETAMINOPHEN 1 TABLET: 5; 325 TABLET ORAL at 04:25

## 2022-05-27 RX ADMIN — OXYCODONE HYDROCHLORIDE AND ACETAMINOPHEN 1 TABLET: 5; 325 TABLET ORAL at 11:05

## 2022-05-27 RX ADMIN — LABETALOL HCL 100 MG: 100 TABLET, FILM COATED ORAL at 08:42

## 2022-05-27 ASSESSMENT — ACTIVITIES OF DAILY LIVING (ADL)
ADLS_ACUITY_SCORE: 18

## 2022-05-27 NOTE — PLAN OF CARE
Problem: Hypertensive Disorders in Pregnancy  Goal: Maternal-Fetal Stabilization  5/27/2022 0527 by Peggy Wren, RN  Outcome: Ongoing, Progressing     Problem: Pain Acute  Goal: Acceptable Pain Control and Functional Ability  5/27/2022 0527 by Peggy Wren RN  Outcome: Ongoing, Progressing    Bps still in elevated range at 130's-140's/80's-90's. Able to sleep between cares. Rated pain to head as 5/10 at time of NST, after RN woke her for cares. Oxycodone, Percocet and Hydroxazine given at that time per pt request. No other complaints.Peggy Wren, RN

## 2022-05-27 NOTE — PROGRESS NOTES
Spoke with Dr. Mcginnis via phone to make plan for the night. MD has seen pt and discussed monitoring Bps overnight. MD plans to see pt in early am. Will continue q4 VS and NST this evening as well as once overnight. Will group cares as able to allow pt to sleep. MD in agreement with offering pt Oxycodone/Percocet/vistaril for sleep. Verified with MD that Atarax ordered as 3x/daily does not mean q8 hours and can give again at 6 hour quan. .Peggy Wren RN

## 2022-05-27 NOTE — PROGRESS NOTES
Assuming care of pt after receiving report. Informed that Dr Mcginnis is aware of pt desire to speak with her this evening.Peggy Wren RN

## 2022-05-27 NOTE — PROGRESS NOTES
Pt lying on couch, resting. Returned to bed to VS and assessment. When asked how her HA was, pt stated that she thought the percocet/oxycodone every 6 hours is helping. Discussed when next dose available as well as when Atarax was available. Pt opting to take both at same time, around 2130, to be able to sleep. Plan made for NST at that time. Discussed grouping cares to allow her to rest. Pt in agreement. Discussed VS at midnight and 0400. Plan made for NST at 0400 as well. Discussed that RN will not come into room at other times unless pt calls out. Pt in agreement with plan and will call out with needs.  BP was 144/96 on high fowlers and repeat was 148/86. Discussed elevated pressure, but not severe. Discussed that BP was elevated last evening as well. Pt denies epigastric and blurred vision. Denies cough at this time. Afebrile. up in room independently. No weakness reported. .Peggy Wren RN

## 2022-05-27 NOTE — PLAN OF CARE
Problem: Hypertensive Disorders in Pregnancy  Goal: Maternal-Fetal Stabilization  Outcome: Ongoing, Progressing     Problem: Pain Acute  Goal: Acceptable Pain Control and Functional Ability  Outcome: Ongoing, Progressing     Bps elevated but not severe this evening. Pt stated HA was better with Percocet, oxycodone and hydroxazine q6 hours. Meds given at 2130. Pt rated pain to head as 6/10. Monitors placed for evening NST. Pt asleep before 20 minute strip completed and did not wake up to RN removing straps or talking to SO. Will continue to group cares.Peggy Wren RN

## 2022-05-28 ENCOUNTER — PATIENT OUTREACH (OUTPATIENT)
Dept: CARE COORDINATION | Facility: CLINIC | Age: 27
End: 2022-05-28
Payer: MEDICAID

## 2022-05-28 DIAGNOSIS — Z71.89 OTHER SPECIFIED COUNSELING: ICD-10-CM

## 2022-05-28 NOTE — PROGRESS NOTES
Clinic Care Coordination Contact    Background: Care Coordination referral placed from [facility] discharge report for reason of patient meeting criteria for a TCM outreach call by [facility] [place] team.    Assessment: Upon chart review, [facility] Team member will cancel/close the referral for TCM outreach due to reason below:    Patient has been discharged to [facility]osp[facility] Care    Plan: Care Coordination referral for TCM outreach canceled.    Ana Mcnulty MA  [facility]

## 2022-05-31 ENCOUNTER — ANCILLARY PROCEDURE (OUTPATIENT)
Dept: ULTRASOUND IMAGING | Facility: HOSPITAL | Age: 27
End: 2022-05-31
Attending: OBSTETRICS & GYNECOLOGY
Payer: MEDICAID

## 2022-05-31 ENCOUNTER — OFFICE VISIT (OUTPATIENT)
Dept: MATERNAL FETAL MEDICINE | Facility: HOSPITAL | Age: 27
End: 2022-05-31
Attending: OBSTETRICS & GYNECOLOGY
Payer: MEDICAID

## 2022-05-31 VITALS — HEART RATE: 87 BPM | SYSTOLIC BLOOD PRESSURE: 125 MMHG | DIASTOLIC BLOOD PRESSURE: 87 MMHG

## 2022-05-31 DIAGNOSIS — O36.5990 PREGNANCY AFFECTED BY FETAL GROWTH RESTRICTION: ICD-10-CM

## 2022-05-31 DIAGNOSIS — O14.93 PRE-ECLAMPSIA IN THIRD TRIMESTER: Primary | ICD-10-CM

## 2022-05-31 PROCEDURE — 76820 UMBILICAL ARTERY ECHO: CPT | Performed by: OBSTETRICS & GYNECOLOGY

## 2022-05-31 PROCEDURE — 76818 FETAL BIOPHYS PROFILE W/NST: CPT | Mod: 26 | Performed by: OBSTETRICS & GYNECOLOGY

## 2022-05-31 PROCEDURE — 76820 UMBILICAL ARTERY ECHO: CPT | Mod: 26 | Performed by: OBSTETRICS & GYNECOLOGY

## 2022-05-31 PROCEDURE — 99215 OFFICE O/P EST HI 40 MIN: CPT | Mod: 25 | Performed by: OBSTETRICS & GYNECOLOGY

## 2022-05-31 PROCEDURE — 76816 OB US FOLLOW-UP PER FETUS: CPT

## 2022-05-31 PROCEDURE — 76820 UMBILICAL ARTERY ECHO: CPT

## 2022-05-31 PROCEDURE — 76816 OB US FOLLOW-UP PER FETUS: CPT | Mod: 26 | Performed by: OBSTETRICS & GYNECOLOGY

## 2022-05-31 NOTE — NURSING NOTE
NST Performed due to fetal growth restriction.  Dr. Bella reviewed efm tracing. See NST/BPP Doc Flowsheet tab.

## 2022-06-09 ENCOUNTER — ANCILLARY PROCEDURE (OUTPATIENT)
Dept: ULTRASOUND IMAGING | Facility: HOSPITAL | Age: 27
End: 2022-06-09
Attending: OBSTETRICS & GYNECOLOGY
Payer: MEDICAID

## 2022-06-09 ENCOUNTER — OFFICE VISIT (OUTPATIENT)
Dept: MATERNAL FETAL MEDICINE | Facility: HOSPITAL | Age: 27
End: 2022-06-09
Attending: OBSTETRICS & GYNECOLOGY
Payer: MEDICAID

## 2022-06-09 VITALS — DIASTOLIC BLOOD PRESSURE: 93 MMHG | HEART RATE: 81 BPM | SYSTOLIC BLOOD PRESSURE: 136 MMHG

## 2022-06-09 DIAGNOSIS — O36.5990 PREGNANCY AFFECTED BY FETAL GROWTH RESTRICTION: Primary | ICD-10-CM

## 2022-06-09 DIAGNOSIS — O36.5990 PREGNANCY AFFECTED BY FETAL GROWTH RESTRICTION: ICD-10-CM

## 2022-06-09 PROCEDURE — 59025 FETAL NON-STRESS TEST: CPT

## 2022-06-09 PROCEDURE — 76818 FETAL BIOPHYS PROFILE W/NST: CPT

## 2022-06-09 PROCEDURE — 76818 FETAL BIOPHYS PROFILE W/NST: CPT | Mod: 26 | Performed by: OBSTETRICS & GYNECOLOGY

## 2022-06-09 PROCEDURE — 76820 UMBILICAL ARTERY ECHO: CPT | Mod: 26 | Performed by: OBSTETRICS & GYNECOLOGY

## 2022-06-09 NOTE — NURSING NOTE
NST Performed due to fetal growth restriction.  Dr. Chan reviewed efm tracing. See NST/BPP Doc Flowsheet tab.

## 2022-06-09 NOTE — PROGRESS NOTES
"Please see \"Imaging\" tab under \"Chart Review\" for details of today's US.    Laurita Chan, DO    "

## 2022-06-11 NOTE — DISCHARGE SUMMARY
[facility] Discharge Summary    Shital Tamayo MRN# [id number]   Age: 27 year old YOB: 1995     Date of Admission:  5/25/2022  Date of Discharge::  5/27/2022 11:15 AM  Admitting Physician:  Donita Burton DO  Discharge Physician:  Jing Mcginnis MD    Home clinic: [facility]           Admission Diagnoses:   Pregnancy [Z34.90]  IUGR  Headache  Elevated blood pressures    COVID         Discharge Diagnosis:   Pregnancy  IUGR  Migraine headaches  gestational hypertension    COVID infection         Procedures:   Nifedipine, pain medications, antihypertensive medication, magnesium sulfate           Medications Prior to Admission:     No medications prior to admission.             Discharge Medications:     Discharge Medication List as of 5/27/2022 10:55 AM      START taking these medications    Details   !! hydrOXYzine (ATARAX) 50 MG tablet Take 2 tablets (100 mg) by mouth 3 times daily as needed for anxiety, Disp-40 tablet, R-0, E-Prescribe      labetalol (NORMODYNE) 100 MG tablet Take 1 tablet (100 mg) by mouth every 12 hours, Disp-28 tablet, R-0, E-Prescribe       !! - Potential duplicate medications found. Please discuss with provider.      CONTINUE these medications which have NOT CHANGED    Details   acetaminophen (TYLENOL) 325 MG tablet Take 325-650 mg by mouth every 6 hours as needed for headaches, Historical      escitalopram (LEXAPRO) 20 MG tablet Take 20 mg by mouth daily, Historical      !! hydrOXYzine (ATARAX) 50 MG tablet Take 50 mg by mouth 3 times daily as needed for anxiety, Historical      Prenatal Vit-Fe Fumarate-FA (PRENATAL MULTIVITAMIN W/IRON) 27-0.8 MG tablet Take 1 tablet by mouth daily, Historical       !! - Potential duplicate medications found. Please discuss with provider.      STOP taking these medications       oxyCODONE-acetaminophen (PERCOCET) 5-325 MG tablet Comments:   Reason for Stopping:                     Consultations:   No consultations  were requested during this admission          Brief History of Illness:   Shital Tamayo is a 27 year old female  29w 1d  Estimated Date of Delivery: Aug 8, 2022 is calculated from Patient's last menstrual period was 2021. [facility] with elevated blood pressure she had 2 severe range Bps at home. She has a mild headache. She does get headaches. This headache just started. Denies changes in vision or RUQ pain. + FM.           Hospital Course:   She was admitted, given magnesium for presumed preeclampsia- but was not found to have this after testing and was then stopped.  Given antihypertensive medication for gestational hypertension and pain medications for her migraines. Her pressures and headache improved   with medication.          Discharge Instructions and Follow-Up:   Discharge diet: Regular   Discharge activity: Activity as tolerated   Discharge follow-up: Follow up with Dr. Nieves in 1 day for bp check    Wound care:            Discharge Disposition:   Discharged to home      Attestation:  I have reviewed today's vital signs, notes, medications, labs and imaging.    Jing Mcginnis MD

## 2022-06-14 ENCOUNTER — HOSPITAL ENCOUNTER (OUTPATIENT)
Facility: HOSPITAL | Age: 27
Discharge: HOME | End: 2022-06-14
Attending: OBSTETRICS & GYNECOLOGY | Admitting: STUDENT IN AN ORGANIZED HEALTH CARE EDUCATION/TRAINING PROGRAM
Payer: MEDICAID

## 2022-06-14 ENCOUNTER — TRANSFERRED RECORDS (OUTPATIENT)
Dept: PEDIATRICS | Facility: HOSPITAL | Age: 27
End: 2022-06-14

## 2022-06-14 ENCOUNTER — HOSPITAL ENCOUNTER (OUTPATIENT)
Dept: ULTRASOUND IMAGING | Facility: CLINIC | Age: 27
Discharge: HOME | End: 2022-06-14
Attending: OBSTETRICS & GYNECOLOGY
Payer: MEDICAID

## 2022-06-14 ENCOUNTER — OFFICE VISIT (OUTPATIENT)
Dept: MATERNAL FETAL MEDICINE | Facility: CLINIC | Age: 27
End: 2022-06-14
Attending: OBSTETRICS & GYNECOLOGY
Payer: MEDICAID

## 2022-06-14 VITALS
DIASTOLIC BLOOD PRESSURE: 93 MMHG | RESPIRATION RATE: 18 BRPM | OXYGEN SATURATION: 100 % | SYSTOLIC BLOOD PRESSURE: 151 MMHG | TEMPERATURE: 98.3 F

## 2022-06-14 VITALS — DIASTOLIC BLOOD PRESSURE: 96 MMHG | HEART RATE: 98 BPM | SYSTOLIC BLOOD PRESSURE: 143 MMHG

## 2022-06-14 DIAGNOSIS — O14.13 PREECLAMPSIA, SEVERE, THIRD TRIMESTER: ICD-10-CM

## 2022-06-14 DIAGNOSIS — O36.5990 PREGNANCY AFFECTED BY FETAL GROWTH RESTRICTION: ICD-10-CM

## 2022-06-14 DIAGNOSIS — O36.5990 PREGNANCY AFFECTED BY FETAL GROWTH RESTRICTION: Primary | ICD-10-CM

## 2022-06-14 PROBLEM — O14.90 PRE-ECLAMPSIA: Status: ACTIVE | Noted: 2022-06-14

## 2022-06-14 LAB
ABO/RH(D): NORMAL
ALBUMIN MFR UR ELPH: <7 MG/DL
ALT SERPL W P-5'-P-CCNC: 12 U/L (ref 0–45)
ANTIBODY SCREEN: NEGATIVE
AST SERPL W P-5'-P-CCNC: 16 U/L (ref 0–40)
CREAT SERPL-MCNC: 0.7 MG/DL (ref 0.6–1.1)
CREAT UR-MCNC: 24 MG/DL
ERYTHROCYTE [DISTWIDTH] IN BLOOD BY AUTOMATED COUNT: 13.9 % (ref 10–15)
GFR SERPL CREATININE-BSD FRML MDRD: >90 ML/MIN/1.73M2
HCT VFR BLD AUTO: 36.3 % (ref 35–47)
HGB BLD-MCNC: 11.9 G/DL (ref 11.7–15.7)
MCH RBC QN AUTO: 28.3 PG (ref 26.5–33)
MCHC RBC AUTO-ENTMCNC: 32.8 G/DL (ref 31.5–36.5)
MCV RBC AUTO: 86 FL (ref 78–100)
PLATELET # BLD AUTO: 239 10E3/UL (ref 150–450)
PROT/CREAT 24H UR: NORMAL MG/G{CREAT}
RBC # BLD AUTO: 4.21 10E6/UL (ref 3.8–5.2)
SPECIMEN EXPIRATION DATE: NORMAL
WBC # BLD AUTO: 12.3 10E3/UL (ref 4–11)

## 2022-06-14 PROCEDURE — 86901 BLOOD TYPING SEROLOGIC RH(D): CPT | Performed by: STUDENT IN AN ORGANIZED HEALTH CARE EDUCATION/TRAINING PROGRAM

## 2022-06-14 PROCEDURE — 36415 COLL VENOUS BLD VENIPUNCTURE: CPT | Performed by: STUDENT IN AN ORGANIZED HEALTH CARE EDUCATION/TRAINING PROGRAM

## 2022-06-14 PROCEDURE — 258N000003 HC RX IP 258 OP 636: Performed by: STUDENT IN AN ORGANIZED HEALTH CARE EDUCATION/TRAINING PROGRAM

## 2022-06-14 PROCEDURE — 84460 ALANINE AMINO (ALT) (SGPT): CPT | Performed by: STUDENT IN AN ORGANIZED HEALTH CARE EDUCATION/TRAINING PROGRAM

## 2022-06-14 PROCEDURE — G0463 HOSPITAL OUTPT CLINIC VISIT: HCPCS

## 2022-06-14 PROCEDURE — 76815 OB US LIMITED FETUS(S): CPT | Mod: 26 | Performed by: OBSTETRICS & GYNECOLOGY

## 2022-06-14 PROCEDURE — 96360 HYDRATION IV INFUSION INIT: CPT

## 2022-06-14 PROCEDURE — 2894A VOIDCORRECT: CPT | Mod: 26 | Performed by: OBSTETRICS & GYNECOLOGY

## 2022-06-14 PROCEDURE — 76818 FETAL BIOPHYS PROFILE W/NST: CPT | Mod: 26 | Performed by: OBSTETRICS & GYNECOLOGY

## 2022-06-14 PROCEDURE — 96361 HYDRATE IV INFUSION ADD-ON: CPT

## 2022-06-14 PROCEDURE — 84450 TRANSFERASE (AST) (SGOT): CPT | Performed by: STUDENT IN AN ORGANIZED HEALTH CARE EDUCATION/TRAINING PROGRAM

## 2022-06-14 PROCEDURE — 76819 FETAL BIOPHYS PROFIL W/O NST: CPT

## 2022-06-14 PROCEDURE — 250N000013 HC RX MED GY IP 250 OP 250 PS 637: Performed by: STUDENT IN AN ORGANIZED HEALTH CARE EDUCATION/TRAINING PROGRAM

## 2022-06-14 PROCEDURE — 84156 ASSAY OF PROTEIN URINE: CPT | Performed by: OBSTETRICS & GYNECOLOGY

## 2022-06-14 PROCEDURE — 86850 RBC ANTIBODY SCREEN: CPT | Performed by: STUDENT IN AN ORGANIZED HEALTH CARE EDUCATION/TRAINING PROGRAM

## 2022-06-14 PROCEDURE — 85027 COMPLETE CBC AUTOMATED: CPT | Performed by: STUDENT IN AN ORGANIZED HEALTH CARE EDUCATION/TRAINING PROGRAM

## 2022-06-14 PROCEDURE — 76820 UMBILICAL ARTERY ECHO: CPT | Mod: 26 | Performed by: OBSTETRICS & GYNECOLOGY

## 2022-06-14 PROCEDURE — 82565 ASSAY OF CREATININE: CPT | Performed by: STUDENT IN AN ORGANIZED HEALTH CARE EDUCATION/TRAINING PROGRAM

## 2022-06-14 RX ORDER — AMOXICILLIN 250 MG
1 CAPSULE ORAL 2 TIMES DAILY
Status: DISCONTINUED | OUTPATIENT
Start: 2022-06-14 | End: 2022-06-14 | Stop reason: HOSPADM

## 2022-06-14 RX ORDER — SIMETHICONE 80 MG
160 TABLET,CHEWABLE ORAL EVERY 4 HOURS PRN
Status: DISCONTINUED | OUTPATIENT
Start: 2022-06-14 | End: 2022-06-14 | Stop reason: HOSPADM

## 2022-06-14 RX ORDER — ONDANSETRON 4 MG/1
4 TABLET, ORALLY DISINTEGRATING ORAL EVERY 6 HOURS PRN
Status: DISCONTINUED | OUTPATIENT
Start: 2022-06-14 | End: 2022-06-14 | Stop reason: HOSPADM

## 2022-06-14 RX ORDER — SODIUM CHLORIDE, SODIUM LACTATE, POTASSIUM CHLORIDE, CALCIUM CHLORIDE 600; 310; 30; 20 MG/100ML; MG/100ML; MG/100ML; MG/100ML
10-125 INJECTION, SOLUTION INTRAVENOUS CONTINUOUS
Status: DISCONTINUED | OUTPATIENT
Start: 2022-06-14 | End: 2022-06-14 | Stop reason: HOSPADM

## 2022-06-14 RX ORDER — ACETAMINOPHEN 325 MG/1
650 TABLET ORAL EVERY 4 HOURS PRN
Status: DISCONTINUED | OUTPATIENT
Start: 2022-06-14 | End: 2022-06-14 | Stop reason: HOSPADM

## 2022-06-14 RX ORDER — ONDANSETRON 2 MG/ML
4 INJECTION INTRAMUSCULAR; INTRAVENOUS EVERY 6 HOURS PRN
Status: DISCONTINUED | OUTPATIENT
Start: 2022-06-14 | End: 2022-06-14 | Stop reason: HOSPADM

## 2022-06-14 RX ORDER — ACETAMINOPHEN 325 MG/1
325-650 TABLET ORAL EVERY 6 HOURS PRN
Status: DISCONTINUED | OUTPATIENT
Start: 2022-06-14 | End: 2022-06-14 | Stop reason: HOSPADM

## 2022-06-14 RX ORDER — METOCLOPRAMIDE 10 MG/1
10 TABLET ORAL EVERY 6 HOURS PRN
Status: DISCONTINUED | OUTPATIENT
Start: 2022-06-14 | End: 2022-06-14 | Stop reason: HOSPADM

## 2022-06-14 RX ORDER — MAGNESIUM HYDROXIDE/ALUMINUM HYDROXICE/SIMETHICONE 120; 1200; 1200 MG/30ML; MG/30ML; MG/30ML
30 SUSPENSION ORAL
Status: DISCONTINUED | OUTPATIENT
Start: 2022-06-14 | End: 2022-06-14 | Stop reason: HOSPADM

## 2022-06-14 RX ORDER — HYDRALAZINE HYDROCHLORIDE 20 MG/ML
5-10 INJECTION INTRAMUSCULAR; INTRAVENOUS
Status: DISCONTINUED | OUTPATIENT
Start: 2022-06-14 | End: 2022-06-14 | Stop reason: HOSPADM

## 2022-06-14 RX ORDER — OXYCODONE AND ACETAMINOPHEN 5; 325 MG/1; MG/1
1 TABLET ORAL ONCE
Status: COMPLETED | OUTPATIENT
Start: 2022-06-14 | End: 2022-06-14

## 2022-06-14 RX ORDER — PRENATAL VIT/IRON FUM/FOLIC AC 27MG-0.8MG
1 TABLET ORAL DAILY
Status: DISCONTINUED | OUTPATIENT
Start: 2022-06-14 | End: 2022-06-14

## 2022-06-14 RX ORDER — PROCHLORPERAZINE 25 MG
25 SUPPOSITORY, RECTAL RECTAL EVERY 12 HOURS PRN
Status: DISCONTINUED | OUTPATIENT
Start: 2022-06-14 | End: 2022-06-14 | Stop reason: HOSPADM

## 2022-06-14 RX ORDER — DIPHENHYDRAMINE HYDROCHLORIDE 50 MG/ML
25 INJECTION INTRAMUSCULAR; INTRAVENOUS EVERY 6 HOURS PRN
Status: DISCONTINUED | OUTPATIENT
Start: 2022-06-14 | End: 2022-06-14 | Stop reason: HOSPADM

## 2022-06-14 RX ORDER — MAGNESIUM SULFATE HEPTAHYDRATE 500 MG/ML
10 INJECTION, SOLUTION INTRAMUSCULAR; INTRAVENOUS
Status: DISCONTINUED | OUTPATIENT
Start: 2022-06-14 | End: 2022-06-14 | Stop reason: HOSPADM

## 2022-06-14 RX ORDER — POLYETHYLENE GLYCOL 3350 17 G/17G
17 POWDER, FOR SOLUTION ORAL 2 TIMES DAILY PRN
Status: DISCONTINUED | OUTPATIENT
Start: 2022-06-14 | End: 2022-06-14 | Stop reason: HOSPADM

## 2022-06-14 RX ORDER — MAGNESIUM SULFATE HEPTAHYDRATE 40 MG/ML
2 INJECTION, SOLUTION INTRAVENOUS
Status: DISCONTINUED | OUTPATIENT
Start: 2022-06-14 | End: 2022-06-14 | Stop reason: HOSPADM

## 2022-06-14 RX ORDER — HYDROXYZINE HYDROCHLORIDE 50 MG/1
100 TABLET, FILM COATED ORAL 3 TIMES DAILY PRN
Status: DISCONTINUED | OUTPATIENT
Start: 2022-06-14 | End: 2022-06-14 | Stop reason: HOSPADM

## 2022-06-14 RX ORDER — METOCLOPRAMIDE HYDROCHLORIDE 5 MG/ML
10 INJECTION INTRAMUSCULAR; INTRAVENOUS EVERY 6 HOURS PRN
Status: DISCONTINUED | OUTPATIENT
Start: 2022-06-14 | End: 2022-06-14 | Stop reason: HOSPADM

## 2022-06-14 RX ORDER — DOCUSATE SODIUM 100 MG/1
100 CAPSULE, LIQUID FILLED ORAL 2 TIMES DAILY PRN
Status: DISCONTINUED | OUTPATIENT
Start: 2022-06-14 | End: 2022-06-14 | Stop reason: HOSPADM

## 2022-06-14 RX ORDER — LORAZEPAM 2 MG/ML
2 INJECTION INTRAMUSCULAR
Status: DISCONTINUED | OUTPATIENT
Start: 2022-06-14 | End: 2022-06-14 | Stop reason: HOSPADM

## 2022-06-14 RX ORDER — DIPHENHYDRAMINE HCL 25 MG
25 CAPSULE ORAL EVERY 6 HOURS PRN
Status: DISCONTINUED | OUTPATIENT
Start: 2022-06-14 | End: 2022-06-14 | Stop reason: HOSPADM

## 2022-06-14 RX ORDER — AMOXICILLIN 250 MG
2 CAPSULE ORAL 2 TIMES DAILY
Status: DISCONTINUED | OUTPATIENT
Start: 2022-06-14 | End: 2022-06-14 | Stop reason: HOSPADM

## 2022-06-14 RX ORDER — HYDROXYZINE HYDROCHLORIDE 50 MG/1
50 TABLET, FILM COATED ORAL
Status: DISCONTINUED | OUTPATIENT
Start: 2022-06-14 | End: 2022-06-14 | Stop reason: HOSPADM

## 2022-06-14 RX ORDER — LABETALOL 100 MG/1
100 TABLET, FILM COATED ORAL EVERY 12 HOURS
Status: DISCONTINUED | OUTPATIENT
Start: 2022-06-14 | End: 2022-06-14 | Stop reason: HOSPADM

## 2022-06-14 RX ORDER — OXYCODONE HYDROCHLORIDE 5 MG/1
5 TABLET ORAL ONCE
Status: COMPLETED | OUTPATIENT
Start: 2022-06-14 | End: 2022-06-14

## 2022-06-14 RX ORDER — PROCHLORPERAZINE MALEATE 10 MG
10 TABLET ORAL EVERY 6 HOURS PRN
Status: DISCONTINUED | OUTPATIENT
Start: 2022-06-14 | End: 2022-06-14 | Stop reason: HOSPADM

## 2022-06-14 RX ORDER — BISACODYL 10 MG
10 SUPPOSITORY, RECTAL RECTAL DAILY PRN
Status: DISCONTINUED | OUTPATIENT
Start: 2022-06-16 | End: 2022-06-14 | Stop reason: HOSPADM

## 2022-06-14 RX ORDER — MAGNESIUM SULFATE 4 G/50ML
4 INJECTION INTRAVENOUS
Status: DISCONTINUED | OUTPATIENT
Start: 2022-06-14 | End: 2022-06-14 | Stop reason: HOSPADM

## 2022-06-14 RX ORDER — PANTOPRAZOLE SODIUM 40 MG/1
40 TABLET, DELAYED RELEASE ORAL
Status: DISCONTINUED | OUTPATIENT
Start: 2022-06-15 | End: 2022-06-14 | Stop reason: HOSPADM

## 2022-06-14 RX ORDER — LABETALOL HYDROCHLORIDE 5 MG/ML
20-40 INJECTION, SOLUTION INTRAVENOUS EVERY 10 MIN PRN
Status: DISCONTINUED | OUTPATIENT
Start: 2022-06-14 | End: 2022-06-14 | Stop reason: HOSPADM

## 2022-06-14 RX ORDER — PRENATAL VIT/IRON FUM/FOLIC AC 27MG-0.8MG
1 TABLET ORAL DAILY
Status: DISCONTINUED | OUTPATIENT
Start: 2022-06-14 | End: 2022-06-14 | Stop reason: HOSPADM

## 2022-06-14 RX ADMIN — OXYCODONE HYDROCHLORIDE AND ACETAMINOPHEN 1 TABLET: 5; 325 TABLET ORAL at 20:10

## 2022-06-14 RX ADMIN — OXYCODONE HYDROCHLORIDE 5 MG: 5 TABLET ORAL at 20:11

## 2022-06-14 RX ADMIN — OXYCODONE HYDROCHLORIDE 5 MG: 5 TABLET ORAL at 17:45

## 2022-06-14 RX ADMIN — SODIUM CHLORIDE, POTASSIUM CHLORIDE, SODIUM LACTATE AND CALCIUM CHLORIDE 125 ML/HR: 600; 310; 30; 20 INJECTION, SOLUTION INTRAVENOUS at 17:22

## 2022-06-14 NOTE — PROGRESS NOTES
Shital is here for evaluation of FHR and BP's.  She has a cat 1 tracing.  Her BP is 139/90.  She is c/o headache, denies visual disturbance, some nausea but no vomiting.  She is req oxycodone for headache.  She took 1 325mg tylenol of her own when she arrived.  Notified Dr Garcia and orders rec'vd.

## 2022-06-14 NOTE — NURSING NOTE
"NST Performed due to FGR/severe Pre-E.   reviewed efm tracing. FHT's tachycardic with baseline 170. Moderate variability. Declines HA, visual disturbances or swelling. States \"I feel big.\" Does get regular HA's but declines one at this time. BP checked - see flowsheet. Dr Garcia recommends further evaluation in LD - patient accepts. JA Rodriguez at [facility] called with report. Dr Garcia spoke with Dr Garcia. See NST/BPP Doc Flowsheet tab. Discharged ambulatory and stable with significant other. Instructed to immediately go to LD.    Juliette Marie RN      "

## 2022-06-15 ENCOUNTER — APPOINTMENT (OUTPATIENT)
Dept: MRI IMAGING | Facility: HOSPITAL | Age: 27
End: 2022-06-15
Attending: PSYCHIATRY & NEUROLOGY
Payer: MEDICAID

## 2022-06-15 ENCOUNTER — HOSPITAL ENCOUNTER (INPATIENT)
Facility: HOSPITAL | Age: 27
LOS: 9 days | Discharge: HOME | End: 2022-06-24
Attending: STUDENT IN AN ORGANIZED HEALTH CARE EDUCATION/TRAINING PROGRAM | Admitting: OBSTETRICS & GYNECOLOGY
Payer: MEDICAID

## 2022-06-15 PROCEDURE — 70551 MRI BRAIN STEM W/O DYE: CPT

## 2022-06-15 PROCEDURE — 70544 MR ANGIOGRAPHY HEAD W/O DYE: CPT

## 2022-06-15 PROCEDURE — 120N000001 HC R&B MED SURG/OB

## 2022-06-15 PROCEDURE — 250N000013 HC RX MED GY IP 250 OP 250 PS 637: Performed by: STUDENT IN AN ORGANIZED HEALTH CARE EDUCATION/TRAINING PROGRAM

## 2022-06-15 PROCEDURE — 99255 IP/OBS CONSLTJ NEW/EST HI 80: CPT | Performed by: PSYCHIATRY & NEUROLOGY

## 2022-06-15 PROCEDURE — 250N000011 HC RX IP 250 OP 636: Performed by: OBSTETRICS & GYNECOLOGY

## 2022-06-15 PROCEDURE — 999N000127 HC STATISTIC PERIPHERAL IV START W US GUIDANCE

## 2022-06-15 RX ORDER — MAGNESIUM HYDROXIDE/ALUMINUM HYDROXICE/SIMETHICONE 120; 1200; 1200 MG/30ML; MG/30ML; MG/30ML
30 SUSPENSION ORAL
Status: DISCONTINUED | OUTPATIENT
Start: 2022-06-15 | End: 2022-06-22 | Stop reason: HOSPADM

## 2022-06-15 RX ORDER — AMOXICILLIN 250 MG
2 CAPSULE ORAL 2 TIMES DAILY
Status: DISCONTINUED | OUTPATIENT
Start: 2022-06-15 | End: 2022-06-22 | Stop reason: HOSPADM

## 2022-06-15 RX ORDER — PROCHLORPERAZINE 25 MG
25 SUPPOSITORY, RECTAL RECTAL EVERY 12 HOURS PRN
Status: DISCONTINUED | OUTPATIENT
Start: 2022-06-15 | End: 2022-06-22 | Stop reason: HOSPADM

## 2022-06-15 RX ORDER — ACETAMINOPHEN 325 MG/1
325-650 TABLET ORAL EVERY 6 HOURS PRN
Status: DISCONTINUED | OUTPATIENT
Start: 2022-06-15 | End: 2022-06-24 | Stop reason: HOSPADM

## 2022-06-15 RX ORDER — METOCLOPRAMIDE HYDROCHLORIDE 5 MG/ML
10 INJECTION INTRAMUSCULAR; INTRAVENOUS EVERY 6 HOURS PRN
Status: DISCONTINUED | OUTPATIENT
Start: 2022-06-15 | End: 2022-06-22 | Stop reason: HOSPADM

## 2022-06-15 RX ORDER — DIPHENHYDRAMINE HYDROCHLORIDE 50 MG/ML
25 INJECTION INTRAMUSCULAR; INTRAVENOUS EVERY 6 HOURS PRN
Status: DISCONTINUED | OUTPATIENT
Start: 2022-06-15 | End: 2022-06-22 | Stop reason: HOSPADM

## 2022-06-15 RX ORDER — MAGNESIUM SULFATE HEPTAHYDRATE 40 MG/ML
2 INJECTION, SOLUTION INTRAVENOUS
Status: DISCONTINUED | OUTPATIENT
Start: 2022-06-15 | End: 2022-06-22

## 2022-06-15 RX ORDER — ACETAMINOPHEN 325 MG/1
650 TABLET ORAL EVERY 4 HOURS PRN
Status: DISCONTINUED | OUTPATIENT
Start: 2022-06-15 | End: 2022-06-22 | Stop reason: HOSPADM

## 2022-06-15 RX ORDER — MAGNESIUM SULFATE 4 G/50ML
4 INJECTION INTRAVENOUS
Status: DISCONTINUED | OUTPATIENT
Start: 2022-06-15 | End: 2022-06-22

## 2022-06-15 RX ORDER — HYDRALAZINE HYDROCHLORIDE 20 MG/ML
5-10 INJECTION INTRAMUSCULAR; INTRAVENOUS
Status: DISCONTINUED | OUTPATIENT
Start: 2022-06-15 | End: 2022-06-24 | Stop reason: HOSPADM

## 2022-06-15 RX ORDER — PRENATAL VIT/IRON FUM/FOLIC AC 27MG-0.8MG
1 TABLET ORAL DAILY
Status: DISCONTINUED | OUTPATIENT
Start: 2022-06-15 | End: 2022-06-15

## 2022-06-15 RX ORDER — PRENATAL VIT/IRON FUM/FOLIC AC 27MG-0.8MG
1 TABLET ORAL DAILY
Status: DISCONTINUED | OUTPATIENT
Start: 2022-06-15 | End: 2022-06-22 | Stop reason: HOSPADM

## 2022-06-15 RX ORDER — PANTOPRAZOLE SODIUM 40 MG/1
40 TABLET, DELAYED RELEASE ORAL
Status: DISCONTINUED | OUTPATIENT
Start: 2022-06-15 | End: 2022-06-22 | Stop reason: HOSPADM

## 2022-06-15 RX ORDER — LABETALOL 100 MG/1
100 TABLET, FILM COATED ORAL EVERY 12 HOURS
Status: DISCONTINUED | OUTPATIENT
Start: 2022-06-15 | End: 2022-06-23

## 2022-06-15 RX ORDER — POLYETHYLENE GLYCOL 3350 17 G/17G
17 POWDER, FOR SOLUTION ORAL DAILY PRN
Status: DISCONTINUED | OUTPATIENT
Start: 2022-06-15 | End: 2022-06-22 | Stop reason: HOSPADM

## 2022-06-15 RX ORDER — AMOXICILLIN 250 MG
1 CAPSULE ORAL 2 TIMES DAILY
Status: DISCONTINUED | OUTPATIENT
Start: 2022-06-15 | End: 2022-06-22 | Stop reason: HOSPADM

## 2022-06-15 RX ORDER — PROCHLORPERAZINE MALEATE 10 MG
10 TABLET ORAL EVERY 6 HOURS PRN
Status: DISCONTINUED | OUTPATIENT
Start: 2022-06-15 | End: 2022-06-22 | Stop reason: HOSPADM

## 2022-06-15 RX ORDER — NALBUPHINE HYDROCHLORIDE 10 MG/ML
10 INJECTION, SOLUTION INTRAMUSCULAR; INTRAVENOUS; SUBCUTANEOUS ONCE
Status: COMPLETED | OUTPATIENT
Start: 2022-06-15 | End: 2022-06-15

## 2022-06-15 RX ORDER — DOCUSATE SODIUM 100 MG/1
100 CAPSULE, LIQUID FILLED ORAL 2 TIMES DAILY PRN
Status: DISCONTINUED | OUTPATIENT
Start: 2022-06-15 | End: 2022-06-22 | Stop reason: HOSPADM

## 2022-06-15 RX ORDER — MAGNESIUM SULFATE HEPTAHYDRATE 500 MG/ML
10 INJECTION, SOLUTION INTRAMUSCULAR; INTRAVENOUS
Status: DISCONTINUED | OUTPATIENT
Start: 2022-06-15 | End: 2022-06-22

## 2022-06-15 RX ORDER — CYCLOBENZAPRINE HCL 10 MG
10 TABLET ORAL EVERY 8 HOURS PRN
Status: DISCONTINUED | OUTPATIENT
Start: 2022-06-15 | End: 2022-06-24 | Stop reason: HOSPADM

## 2022-06-15 RX ORDER — LORAZEPAM 2 MG/ML
2 INJECTION INTRAMUSCULAR
Status: DISCONTINUED | OUTPATIENT
Start: 2022-06-15 | End: 2022-06-24 | Stop reason: HOSPADM

## 2022-06-15 RX ORDER — DIPHENHYDRAMINE HCL 25 MG
25 CAPSULE ORAL EVERY 6 HOURS PRN
Status: DISCONTINUED | OUTPATIENT
Start: 2022-06-15 | End: 2022-06-22 | Stop reason: HOSPADM

## 2022-06-15 RX ORDER — LABETALOL HYDROCHLORIDE 5 MG/ML
20-40 INJECTION, SOLUTION INTRAVENOUS EVERY 10 MIN PRN
Status: DISCONTINUED | OUTPATIENT
Start: 2022-06-15 | End: 2022-06-24 | Stop reason: HOSPADM

## 2022-06-15 RX ORDER — SIMETHICONE 80 MG
160 TABLET,CHEWABLE ORAL EVERY 4 HOURS PRN
Status: DISCONTINUED | OUTPATIENT
Start: 2022-06-15 | End: 2022-06-22 | Stop reason: HOSPADM

## 2022-06-15 RX ORDER — METOCLOPRAMIDE 10 MG/1
10 TABLET ORAL EVERY 6 HOURS PRN
Status: DISCONTINUED | OUTPATIENT
Start: 2022-06-15 | End: 2022-06-22 | Stop reason: HOSPADM

## 2022-06-15 RX ORDER — HYDROXYZINE HYDROCHLORIDE 50 MG/1
100 TABLET, FILM COATED ORAL 3 TIMES DAILY PRN
Status: DISCONTINUED | OUTPATIENT
Start: 2022-06-15 | End: 2022-06-24 | Stop reason: HOSPADM

## 2022-06-15 RX ORDER — BETAMETHASONE SODIUM PHOSPHATE AND BETAMETHASONE ACETATE 3; 3 MG/ML; MG/ML
12 INJECTION, SUSPENSION INTRA-ARTICULAR; INTRALESIONAL; INTRAMUSCULAR; SOFT TISSUE EVERY 24 HOURS
Status: DISCONTINUED | OUTPATIENT
Start: 2022-06-25 | End: 2022-06-24 | Stop reason: HOSPADM

## 2022-06-15 RX ADMIN — ACETAMINOPHEN 650 MG: 325 TABLET, FILM COATED ORAL at 21:30

## 2022-06-15 RX ADMIN — CYCLOBENZAPRINE 10 MG: 10 TABLET, FILM COATED ORAL at 18:34

## 2022-06-15 RX ADMIN — NALBUPHINE HYDROCHLORIDE 10 MG: 10 INJECTION, SOLUTION INTRAMUSCULAR; INTRAVENOUS; SUBCUTANEOUS at 21:30

## 2022-06-15 RX ADMIN — HYDROXYZINE HYDROCHLORIDE 100 MG: 50 TABLET, FILM COATED ORAL at 15:35

## 2022-06-15 RX ADMIN — PRENATAL VIT W/ FE FUMARATE-FA TAB 27-0.8 MG 1 TABLET: 27-0.8 TAB at 08:56

## 2022-06-15 ASSESSMENT — ACTIVITIES OF DAILY LIVING (ADL)
WEAR_GLASSES_OR_BLIND: NO
FALL_HISTORY_WITHIN_LAST_SIX_MONTHS: NO
DIFFICULTY_COMMUNICATING: NO
DIFFICULTY_EATING/SWALLOWING: NO
ADLS_ACUITY_SCORE: 35
ADLS_ACUITY_SCORE: 18
TOILETING_ISSUES: NO
CHANGE_IN_FUNCTIONAL_STATUS_SINCE_ONSET_OF_CURRENT_ILLNESS/INJURY: NO
CONCENTRATING,_REMEMBERING_OR_MAKING_DECISIONS_DIFFICULTY: NO
DOING_ERRANDS_INDEPENDENTLY_DIFFICULTY: NO
HEARING_DIFFICULTY_OR_DEAF: NO
ADLS_ACUITY_SCORE: 18
ADLS_ACUITY_SCORE: 18
WALKING_OR_CLIMBING_STAIRS_DIFFICULTY: NO
ADLS_ACUITY_SCORE: 18
ADLS_ACUITY_SCORE: 18
DRESSING/BATHING_DIFFICULTY: NO
ADLS_ACUITY_SCORE: 18

## 2022-06-15 NOTE — PLAN OF CARE
Problem: Pain Acute  Goal: Acceptable Pain Control and Functional Ability  Outcome: Ongoing, Progressing   Shital has had a headache this afternoon. She took Atarax which did not help. She just received flexaril. Will continue to assess effectiveness of pain relief measures.

## 2022-06-15 NOTE — PROGRESS NOTES
Shital arrived back to unit at 0640. She was brought to room 26 and monitors applied. Dr Garcia updated and will place orders.

## 2022-06-15 NOTE — H&P
[facility]    History and Physical       Date of Admission:  6/15/2022    History of Present Illness   Shital Tamayo is a 27 year old  at 32w2d who is being admitted for preeclampsia with severe features.     Patient has been previously admitted for severe range Bps and recently sent to triage for fetal tachycardia.  After chart reviewed by Dr. Garcia she was diagnosed with preeclampsia with severe features.  Was was allowed to go home to care for her dogs and return today for admission.  See note from  from Dr. Garcia for full HPI.     OB COMPLICATIONS:  - Preeclampsia with severe features   - s/p BTMZ -  - Severe FGR: 30w EFW <1%ile with normal dopplers   - Migraines- has been using percocet and benadryl PRN. Sees Dr. Meyer   - Anxiety: On lexapro   - 50lb weight gain in pregnancy   - COVID at 10w and at 29w  - GBS positive       Past Medical History    Past Medical History:   Diagnosis Date     Anxiety      Depressive disorder        Past Surgical History   Past Surgical History:   Procedure Laterality Date     DILATION AND CURETTAGE N/A 3/1/2019    Procedure: DILATION AND CURETTAGE, UTERUS, USING SUCTION;  Surgeon: Becki Nieves MD;  Location: [facility] OR;  Service: Obstetrics       OB History    Para Term  AB Living   2 0 0 0 1 0   SAB IAB Ectopic Multiple Live Births   0 0 0 0 0      # Outcome Date GA Lbr Michele/2nd Weight Sex Delivery Anes PTL Lv   2 Current            1 AB 19              Social History   Social History     Tobacco Use     Smoking status: Never Smoker     Smokeless tobacco: Never Used   Substance Use Topics     Alcohol use: No     Comment: Alcoholic Drinks/day: occasional use     Drug use: No      Family History   History reviewed. No pertinent family history.     Prior to Admission Medications   Prior to Admission Medications   Prescriptions Last Dose Informant Patient Reported? Taking?   Prenatal Vit-Fe  Fumarate-FA (PRENATAL MULTIVITAMIN W/IRON) 27-0.8 MG tablet   Yes No   Sig: Take 1 tablet by mouth daily   acetaminophen (TYLENOL) 325 MG tablet   Yes No   Sig: Take 325-650 mg by mouth every 6 hours as needed for headaches   escitalopram (LEXAPRO) 20 MG tablet   Yes No   Sig: Take 30 mg by mouth daily   hydrOXYzine (ATARAX) 50 MG tablet   No No   Sig: Take 2 tablets (100 mg) by mouth 3 times daily as needed for anxiety   labetalol (NORMODYNE) 100 MG tablet   No No   Sig: Take 1 tablet (100 mg) by mouth every 12 hours      Facility-Administered Medications: None     Allergies   Allergies   Allergen Reactions     Ondansetron Anxiety, Dizziness and Other (See Comments)       Physical Exam   Vital Signs with Ranges  Temp:  [98.2  F (36.8  C)-98.6  F (37  C)] 98.2  F (36.8  C)  Pulse:  [98] 98  Resp:  [16-18] 18  BP: (100-157)/() 104/61  SpO2:  [99 %-100 %] 99 %    Gen: no acute distress, resting comfortably   Pulm: unlabored respirations  Abd: gravid, soft, nontender   Extremities: soft, nontender     Labs reviewed and normal     Fetal Heart Tones: Reactive NST   TOCO:  Acontractile     Ultrasound: Vertex on     Assessment & Plan   Shital Tamayo is a 26yo  at 32w2d admission for preeclampsia with severe features   1. Vitals: wnl  2. FHT: Reassuring  - Dopplers Q shift   3. Preeclampsia with severe features  - Again discussed diagnosis with Shital and recommendation for admission until delivery at 34w   -Reviewed risks of pre-eclampsia with severe features including maternal risks of seizure, kidney failure, liver failure, worsening BP's which can even lead to stroke.  Fetal risks include abruption,  delivery  -Discussed indications for delivery <34 weeks including worsening maternal status, BP's unable to be controlled by medications, worsening labs (AST/ALT, creatinine, HELLP syndrome), non-reassuring fetal status, new neuro symptoms   - Continue labetalol 100mg BID to temporize until delivery    - Labs q72h   - Plan for magnesium for 24h PP   ** Plan for delivery at 34w (6/27)  4. Prematurity   - s/p BTMZ 5/16-5/17. Plan for repeat dose prior to delivery (ordered for 6/25-6/26)  - NICU consult   5. Severe FGR: 30w EFW <1%ile with normal dopplers   - Continue weekly dopplers with MFM and q3w growths   - CMV IgG/ IgM negative  6. Migraines  - Shital has been using percocet and benadryl PRN for migraines. I explained that narcotics are not a sustainable migraine management.    - Consult neurology to help with better migraine regimen.   7. Anxiety: continue home lexapro   8. Routine care  - up ad sapna  - General diet  - SCDs while in bed     Becki Nieves MD

## 2022-06-15 NOTE — PROGRESS NOTES
1930  Dr. Garcia at bedside to discuss plan of care with patient.  Patient requesting to go home and come back in the morning. Pt will like to readdress some personal matters prior to committing to staying in the hospital until pt delivered.  See MD note.      Dr. Garcia ok for patient to discharge home. Pt to come back in the morning or sooner. Pt verbalized understanding and in agreement. All questions and concerns reviewed and answered with patient.  IV taken out.

## 2022-06-15 NOTE — H&P
Date: 2022  Time: 8:08 PM    Admission H&P  Shital Tamayo,  1995, MRN [id number]    PCP: Siobhan Meyer, [phone]  Primary OB: Dr Nieves   Code status:  Prior              Chief Complaint: Extended fetal monitoring, pre-eclampsia with severe features     OBSTETRICAL / DATING HISTORY:  Estimated Date of Delivery: Estimated Date of Delivery: Aug 8, 2022  Gestational Age: 32w1d    OB History    Para Term  AB Living   2 0 0 0 1 0   SAB IAB Ectopic Multiple Live Births   0 0 0 0 0      # Outcome Date GA Lbr Michele/2nd Weight Sex Delivery Anes PTL Lv   2 Current            1 AB 19               HPI:    Shital Tamayo is a 27 year old year old at 32w1d weeks. She presented to labor and delivery from [facility] clinic due to fetal tachycardia during NST today.  Patient has no complaints on admission.    Her prenatal course has been complicated by severe FGR, elevated BP's and chronic migraine HA's.  Patient is not known to me prior to today, prenatal record and hospital records reviewed.     Patient initiated care at [facility] at 7 weeks gestation, BP that visit was 120/74. She had normal panorama and AFP testing.  At her FAS EFW was in the 17%ile, limited views of the fetal heart.  Follow-up US at 25 weeks revealed EFW 2%ile, she was referred to [facility].  ON  she had mild-range BP in [facility] clinic.  On 2022 she reported contractions during evaluation with [facility], was seen in triage for evaluation and found to have elevated BP's.  She was started on PO labetalol and discharged home.  Betamethasone course also initiated, she received a course of betamethasone -.  ON 2022 she presented to triage after 2 severe-range BP's at home.  On arrival she did meet acute treatment parameters and was given 10 mg oral nifedipine for acute treatment of severe-range BP.  Magnesium was started due to concern for pre-eclampsia with severe features.  She was found to be COVID positive, however  which was thought to possibly be contributing to her severe-range BP's and she was ultimately discharged to home on 05/25/2022.      Since then she has followed closely with [facility] clinic for outpatient testing due to severe FGR.  BP's have remained stable on oral meds.        Medical History  Past Medical History:   Diagnosis Date     Anxiety      Depressive disorder        Surgical History  Past Surgical History:   Procedure Laterality Date     DILATION AND CURETTAGE N/A 3/1/2019    Procedure: DILATION AND CURETTAGE, UTERUS, USING SUCTION;  Surgeon: Becki Nieves MD;  Location: [facility];  Service: Obstetrics       Social History  Social History     Socioeconomic History     Marital status: Single     Spouse name: Not on file     Number of children: Not on file     Years of education: Not on file     Highest education level: Not on file   Occupational History     Not on file   Tobacco Use     Smoking status: Never Smoker     Smokeless tobacco: Never Used   Substance and Sexual Activity     Alcohol use: No     Comment: Alcoholic Drinks/day: occasional use     Drug use: No     Sexual activity: Yes     Partners: Male   Other Topics Concern     Not on file   Social History Narrative     Not on file     Social Determinants of Health     Financial Resource Strain: Not on file   Food Insecurity: Not on file   Transportation Needs: Not on file   Physical Activity: Not on file   Stress: Not on file   Social Connections: Not on file   Intimate Partner Violence: Not on file   Housing Stability: Not on file       Allergies   Allergen Reactions     Ondansetron Anxiety, Dizziness and Other (See Comments)       Medications Prior to Admission   Medication Sig Dispense Refill Last Dose     acetaminophen (TYLENOL) 325 MG tablet Take 325-650 mg by mouth every 6 hours as needed for headaches   6/14/2022 at 1645     escitalopram (LEXAPRO) 20 MG tablet Take 30 mg by mouth daily   6/14/2022 at 0800     hydrOXYzine  (ATARAX) 50 MG tablet Take 2 tablets (100 mg) by mouth 3 times daily as needed for anxiety 40 tablet 0 6/14/2022 at 1200     labetalol (NORMODYNE) 100 MG tablet Take 1 tablet (100 mg) by mouth every 12 hours 28 tablet 0 6/14/2022 at 0800     Prenatal Vit-Fe Fumarate-FA (PRENATAL MULTIVITAMIN W/IRON) 27-0.8 MG tablet Take 1 tablet by mouth daily   6/14/2022 at 0800       Review of Systems:      Physical Exam:  Temp:  [98.3  F (36.8  C)-98.6  F (37  C)] 98.3  F (36.8  C)  Pulse:  [98] 98  Resp:  [16-18] 18  BP: (139-157)/() 157/100  SpO2:  [100 %] 100 %    BP (!) 157/100   Temp 98.3  F (36.8  C) (Oral)   Resp 18   LMP 11/01/2021   SpO2 100%   General: NAD  CV: RRR  Lungs: Clear  Abdomen: soft, gravid  FHT baseline 150's, moderate variability, no accels no decels  TOco: quiet      Pertinent Labs  Admission on 06/14/2022   Component Date Value Ref Range Status     WBC Count 06/14/2022 12.3 (A) 4.0 - 11.0 10e3/uL Final     RBC Count 06/14/2022 4.21  3.80 - 5.20 10e6/uL Final     Hemoglobin 06/14/2022 11.9  11.7 - 15.7 g/dL Final     Hematocrit 06/14/2022 36.3  35.0 - 47.0 % Final     MCV 06/14/2022 86  78 - 100 fL Final     MCH 06/14/2022 28.3  26.5 - 33.0 pg Final     MCHC 06/14/2022 32.8  31.5 - 36.5 g/dL Final     RDW 06/14/2022 13.9  10.0 - 15.0 % Final     Platelet Count 06/14/2022 239  150 - 450 10e3/uL Final     AST 06/14/2022 16  0 - 40 U/L Final     ALT 06/14/2022 12  0 - 45 U/L Final     Creatinine 06/14/2022 0.70  0.60 - 1.10 mg/dL Final     GFR Estimate 06/14/2022 >90  >60 mL/min/1.73m2 Final    Effective December 21, 2021 eGFRcr in adults is calculated using the 2021 CKD-EPI creatinine equation which includes age and gender (Genaro et al., NEJ, DOI: 10.1056/INTFtn1231316)     ABO/RH(D) 06/14/2022 A POS   Final     Antibody Screen 06/14/2022 Negative  Negative Final     SPECIMEN EXPIRATION DATE 06/14/2022 [id number]   Final     Total Protein Urine mg/dL 06/14/2022 <7.0  mg/dL Final     Total  Protein UR MG/MG CR 06/14/2022    Final    Unable to calculate:  Urine creatinine or protein value below detectable level     Creatinine Urine mg/dL 06/14/2022 24  mg/dL Final   Admission on 05/25/2022, Discharged on 05/27/2022   Component Date Value Ref Range Status     WBC Count 05/25/2022 10.8  4.0 - 11.0 10e3/uL Final     RBC Count 05/25/2022 3.84  3.80 - 5.20 10e6/uL Final     Hemoglobin 05/25/2022 11.1 (A) 11.7 - 15.7 g/dL Final     Hematocrit 05/25/2022 33.7 (A) 35.0 - 47.0 % Final     MCV 05/25/2022 88  78 - 100 fL Final     MCH 05/25/2022 28.9  26.5 - 33.0 pg Final     MCHC 05/25/2022 32.9  31.5 - 36.5 g/dL Final     RDW 05/25/2022 13.5  10.0 - 15.0 % Final     Platelet Count 05/25/2022 254  150 - 450 10e3/uL Final     AST 05/25/2022 15  0 - 40 U/L Final     ALT 05/25/2022 11  0 - 45 U/L Final     Creatinine 05/25/2022 0.68  0.60 - 1.10 mg/dL Final     GFR Estimate 05/25/2022 >90  >60 mL/min/1.73m2 Final    Effective December 21, 2021 eGFRcr in adults is calculated using the 2021 CKD-EPI creatinine equation which includes age and gender (Genaro et al., [facility], DOI: 10.1056/HUYHzp5399538)     Total Protein Urine mg/dL 05/25/2022 <7.0  mg/dL Final     Total Protein UR MG/MG CR 05/25/2022    Final    Unable to calculate:  Urine creatinine or protein value below detectable level     Creatinine Urine mg/dL 05/25/2022 50  mg/dL Final     SARS CoV2 PCR 05/25/2022 Positive (A) Negative Final    POSITIVE: SARS-CoV-2 (COVID-19) RNA detected, presumed positive.     Hold Specimen 05/25/2022 [facility]   Final   External Order Results on 05/25/2022   Component Date Value Ref Range Status     RPR TITER (EXTERNAL) 05/19/2022 Non Reactive  NON REACTIVE Final     Hepatitis C Antibody (External) 12/21/2021 <0.1  0.0 - 0.9 ratio Final     Scan Lab Results (External) 12/21/2021 See Scanned Report   Final    Toxoplasma Gondii Antibodies, IgM & IgG     Hep B Surface Agn (External) 12/21/2021 Negative  NEGATIVE Final     Treponema Palldum  Antibody (RPR) (* 12/21/2021 Non Reactive  NON REACTIVE Final     Rubella Antibody IgG (External) 12/21/2021 4.37  IMMUNE >0.99 index Final     ABO (External) 12/21/2021 A   Final     Rh (External) 12/21/2021 Positive   Final     Scan Lab Results (External) 12/21/2021 Negative  NEGATIVE Final    Antibody Screen     HIV 1&2 Antibody (External) 12/21/2021 Non Reactive  NON REACTIVE Final     WBC Count (External) 12/21/2021 8.0  3.4 - 10.8 x10E3/uL Final     RBC Count (External) 12/21/2021 4.37  3.77 - 5.28 x10E6/uL Final     Hemoglobin (External) 12/21/2021 13.7  11.1 - 15.9 g/dL Final     Hematocrit (External) 12/21/2021 42.4  34.0 - 46.6 % Final     MCV (External) 12/21/2021 97  79 - 97 fL Final     MCH (External) 12/21/2021 31.4  26.6 - 33.0 Pg Final     MCHC (External) 12/21/2021 32.3  31.5 - 35.7 g/dL Final     RDW (External) 12/21/2021 11.9  11.7 - 15.4 % Final     Platelet Count (External) 12/21/2021 302  150 - 450 x10E3/uL Final     % Neutrophils (External) 12/21/2021 65  Not Estab. % Final     % Lymphocytes (External) 12/21/2021 25  Not Estab. % Final     % Monocytes (External) 12/21/2021 8  Not Estab. % Final     % Eosinophils (External) 12/21/2021 1  Not Estab. % Final     % Basophils (External) 12/21/2021 1  Not Estab. % Final     Absolute Neutrophils (External) 12/21/2021 5.2  1.4 - 7.0 x10E3/uL Final     Absolute Lymphocytes (External) 12/21/2021 2.0  0.7 - 3.1 x10E3/uL Final     Absolute Monocytes (External) 12/21/2021 0.7  0.1 - 0.9 x10E3/uL Final     Absolute Eosinophils (External) 12/21/2021 0.1  0.0 - 0.4 x10E3/uL Final     Absolute Basophils (External) 12/21/2021 0.1  0.0 - 0.2 x10E3/uL Final     Absolute Immature Granulocytes (Ex* 12/21/2021 0.0  0.0 - 0.1 x10E3/uL Final     Nucleated RBCs (External) 12/21/2021 1  % Final   Admission on 05/24/2022, Discharged on 05/25/2022   Component Date Value Ref Range Status     WBC Count 05/24/2022 12.7 (A) 4.0 - 11.0 10e3/uL Final     RBC Count 05/24/2022  4.27  3.80 - 5.20 10e6/uL Final     Hemoglobin 05/24/2022 12.4  11.7 - 15.7 g/dL Final     Hematocrit 05/24/2022 37.3  35.0 - 47.0 % Final     MCV 05/24/2022 87  78 - 100 fL Final     MCH 05/24/2022 29.0  26.5 - 33.0 pg Final     MCHC 05/24/2022 33.2  31.5 - 36.5 g/dL Final     RDW 05/24/2022 13.2  10.0 - 15.0 % Final     Platelet Count 05/24/2022 286  150 - 450 10e3/uL Final     AST 05/24/2022 15  0 - 40 U/L Final     ALT 05/24/2022 18  0 - 45 U/L Final     Creatinine 05/24/2022 0.63  0.60 - 1.10 mg/dL Final     GFR Estimate 05/24/2022 >90  >60 mL/min/1.73m2 Final    Effective December 21, 2021 eGFRcr in adults is calculated using the 2021 CKD-EPI creatinine equation which includes age and gender (Genaro et al., NEJ, DOI: 10.1056/DNQZsc9858011)     Total Protein Urine mg/dL 05/24/2022 <7.0  mg/dL Final     Total Protein UR MG/MG CR 05/24/2022    Final    Unable to calculate:  Urine creatinine or protein value below detectable level     Creatinine Urine mg/dL 05/24/2022 28  mg/dL Final     ABO/RH(D) 05/24/2022 A POS   Final     Antibody Screen 05/24/2022 Negative  Negative Final     SPECIMEN EXPIRATION DATE 05/24/2022 [id number]   Final     Hold Specimen 05/24/2022 [facility]   Final     HIV 1&2 Antibody (External) 12/21/2021 Nonreactive  Nonreactive Final     Hepatitis B Surface Antigen (Exter* 12/21/2021 Negative  Nonreactive Final     Hepatitis C Antibody (External) 12/21/2021 Negative  Nonreactive Final     Rubella Antibody IgG (External) 12/21/2021 Immune  Nonreactive Final     Treponema Palldum Antibody (RPR) (* 12/21/2021 Nonreactive  Nonreactive Final     Group B Strep PCR 05/25/2022 Positive (A) Negative Final    ALERT: Streptococcus agalactiae (Group B Streptococcus) has a high rate of resistance to clindamycin. Therefore, clindamycin is not recommended for treatment unless susceptibility testing has been performed.   Admission on 05/16/2022, Discharged on 05/16/2022   Component Date Value Ref Range Status      Fetal Fibronectin 2022 Negative  Negative Final    A negative fetal fibronectin result indicates a low risk for  birth.     FFN Specimen Integrity 2022 Satisfactory Specimen   Final     Color Urine 2022 Colorless  Colorless, Straw, Light Yellow, Yellow Final     Appearance Urine 2022 Clear  Clear Final     Glucose Urine 2022 Negative  Negative mg/dL Final     Bilirubin Urine 2022 Negative  Negative Final     Ketones Urine 2022 Negative  Negative mg/dL Final     Specific Gravity Urine 2022 1.004  1.001 - 1.030 Final     Blood Urine 2022 Negative  Negative Final     pH Urine 2022 6.5  5.0 - 7.0 Final     Protein Albumin Urine 2022 Negative  Negative mg/dL Final     Urobilinogen Urine 2022 <2.0  <2.0 mg/dL Final     Nitrite Urine 2022 Negative  Negative Final     Leukocyte Esterase Urine 2022 Negative  Negative Final     Trichomonas 2022 Absent  Absent Final     Yeast 2022 Absent  Absent Final     Clue Cells 2022 Absent  Absent Final     WBCs/high power field 2022 None  None Final     AST 2022 14  0 - 40 U/L Final     ALT 2022 13  0 - 45 U/L Final     Creatinine 2022 0.63  0.60 - 1.10 mg/dL Final     GFR Estimate 2022 >90  >60 mL/min/1.73m2 Final    Effective 2021 eGFRcr in adults is calculated using the  CKD-EPI creatinine equation which includes age and gender (Genaro et al., NEJ, DOI: 10.1056/[id number])     WBC Count 2022 10.5  4.0 - 11.0 10e3/uL Final     RBC Count 2022 4.23  3.80 - 5.20 10e6/uL Final     Hemoglobin 2022 12.3  11.7 - 15.7 g/dL Final     Hematocrit 2022 37.0  35.0 - 47.0 % Final     MCV 2022 88  78 - 100 fL Final     MCH 2022 29.1  26.5 - 33.0 pg Final     MCHC 2022 33.2  31.5 - 36.5 g/dL Final     RDW 2022 12.8  10.0 - 15.0 % Final     Platelet Count 2022 254  150 - 450 10e3/uL Final      Hold Specimen 05/16/2022 [facility]   Final     Hold Specimen 05/16/2022 [facility]   Final   Lab on 05/09/2022   Component Date Value Ref Range Status     AST 05/09/2022 15  0 - 40 U/L Final     ALT 05/09/2022 <9  0 - 45 U/L Final     Creatinine 05/09/2022 0.66  0.60 - 1.10 mg/dL Final     GFR Estimate 05/09/2022 >90  >60 mL/min/1.73m2 Final    Effective December 21, 2021 eGFRcr in adults is calculated using the 2021 CKD-EPI creatinine equation which includes age and gender (Genaro et al., NEJ, DOI: 10.1056/TCSIvz1739473)     Total Protein Urine mg/dL 05/09/2022 <7.0  mg/dL Final     Total Protein UR MG/MG CR 05/09/2022    Final    Unable to calculate:  Urine creatinine or protein value below detectable level     Creatinine Urine mg/dL 05/09/2022 16  mg/dL Final     WBC Count 05/09/2022 10.9  4.0 - 11.0 10e3/uL Final     RBC Count 05/09/2022 4.19  3.80 - 5.20 10e6/uL Final     Hemoglobin 05/09/2022 12.3  11.7 - 15.7 g/dL Final     Hematocrit 05/09/2022 37.2  35.0 - 47.0 % Final     MCV 05/09/2022 89  78 - 100 fL Final     MCH 05/09/2022 29.4  26.5 - 33.0 pg Final     MCHC 05/09/2022 33.1  31.5 - 36.5 g/dL Final     RDW 05/09/2022 12.8  10.0 - 15.0 % Final     Platelet Count 05/09/2022 249  150 - 450 10e3/uL Final     GBS positive    Pertinent Radiology:      Impression/Plan:  1. IUP at 32w1d weeks, pre-eclampsia with severe features, severe FGR  -Fetal tachycardia resolved with continued monitoring  -Discussed diagnosis of pre-eclampsia with severe features based on severe-range BP's requiring acute treatment during prior admission.  Discussed even in the setting of COVID and chronic HA's, that normal BP's in early pregnancy escalating to severe-range BP's requiring acute treatment and ongoing BP management suggests a pathologic process, especially in the setting of severe FGR  -BP's have been stable on oral meds  -Discussed recommended treatment for pre-eclampsia with severe features is inpatient management with close  monitoring until delivery at 34 weeks.  Discussed magnesium is indicated during initial stabilization period, as she previously received, then at time of delivery and for first 24 hours after delivery.  Discussed magnesium is the best medication to prevent maternal seizures  -Reviewed risks of pre-eclampsia with severe features including maternal risks of seizure, kidney failure, liver failure, worsening BP's which can even lead to stroke.  Fetal risks include abruption,  delivery  -Discussed indications for delivery <34 weeks including worsening maternal status, BP's unable to be controlled by medications, worsening labs (AST/ALT, creatinine, HELLP syndrome), non-reassuring fetal status, new neuro symptoms   -Patient voices understanding of diagnosis and agreement to be admitted until delivery.  She expresses hardship however staying in the hospital tonight as she was not prepared for a prolonged antepartum admission and has not made arrangements for her dog.  Discussed first choice would be for her partner or another family member to gather her belongings and bring them to the hospital.  However, ultimately her current status is similar to past days where she has been managed outpatient.  Discussed risks of going home tonight including unable to monitor her and baby and that with pre-eclampsia things can seem stable over the course of days/weeks, but ultimately status can worsen over the course of hours.  Patient voices understanding and agrees to return for severe range BP at home, worsening HA/vision changes, RUQ pain, change in FM, vaginal bleeding, contractions or loss of fluid.   -Coordinated with charge RN and we will hold room for admission in AM, plan for patient to arrive at 0630 for admission until delivery.  -Would consider rescue course of betamethasone during admission, will also consult NNP regarding expectations for  delivery.  Plan to continue aggressive fetal monitoring with twice  weekly BPP's/dopplers.    -Discussed recommendation to maintain IV access throughout hospitalization, frequent labs and close maternal and fetal monitoring.    Total time 60 minutes including chart review, time with patient and documentation on date of encounter      Provider: Jing Garcia MD    Date: 2022  Time: 8:08 PM    NIKOLAS Uriostegui 1995, MRN [id number]
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
## 2022-06-15 NOTE — PROGRESS NOTES
Discharge instruction reviewed with patient. Pt encouraged to come back sooner if patient is able to.  Pt encouraged to call correction with any changes in plan. Phone number to unit given to patient.  All questions answered. Pt denied any further questions or concerns at this time.

## 2022-06-15 NOTE — PROGRESS NOTES
"Shital here for admission for hypertensive monitoring until delivery.  She states her body feels heavy and her head is \"pounding\".  Notified Dr Nieves.    "

## 2022-06-15 NOTE — PROGRESS NOTES
"Shital stated at 1530 that she has a headache. She has a cool cloth on her head and her face appears sweaty. VSS. Tylenol and/or Atarax were offered and she wanted only the  Atarax which was  given at 1535. At 1625 she stated that her headache is worse. This writer offered tylenol and Shital stated she took 1 from her purse. This writer explained that she should not take her own medications since staff needs to scan medications into her chart so that there is record of what she has taken, how much and when for her safety. Shital states she took one 325mg tablet and stated \"tylenol doesn't work for me.\" Flexaril was offered and patient requested it.  "

## 2022-06-16 DIAGNOSIS — O14.13 PREECLAMPSIA, SEVERE, THIRD TRIMESTER: Primary | ICD-10-CM

## 2022-06-16 PROCEDURE — 250N000013 HC RX MED GY IP 250 OP 250 PS 637: Performed by: STUDENT IN AN ORGANIZED HEALTH CARE EDUCATION/TRAINING PROGRAM

## 2022-06-16 PROCEDURE — 250N000011 HC RX IP 250 OP 636: Performed by: OBSTETRICS & GYNECOLOGY

## 2022-06-16 PROCEDURE — 120N000001 HC R&B MED SURG/OB

## 2022-06-16 PROCEDURE — 258N000003 HC RX IP 258 OP 636: Performed by: OBSTETRICS & GYNECOLOGY

## 2022-06-16 PROCEDURE — 250N000011 HC RX IP 250 OP 636: Performed by: STUDENT IN AN ORGANIZED HEALTH CARE EDUCATION/TRAINING PROGRAM

## 2022-06-16 PROCEDURE — 99232 SBSQ HOSP IP/OBS MODERATE 35: CPT | Performed by: PSYCHIATRY & NEUROLOGY

## 2022-06-16 RX ORDER — NALBUPHINE HYDROCHLORIDE 10 MG/ML
10 INJECTION, SOLUTION INTRAMUSCULAR; INTRAVENOUS; SUBCUTANEOUS ONCE
Status: COMPLETED | OUTPATIENT
Start: 2022-06-16 | End: 2022-06-16

## 2022-06-16 RX ADMIN — DIPHENHYDRAMINE HYDROCHLORIDE: 50 INJECTION, SOLUTION INTRAMUSCULAR; INTRAVENOUS at 09:21

## 2022-06-16 RX ADMIN — METOCLOPRAMIDE HYDROCHLORIDE 10 MG: 5 INJECTION INTRAMUSCULAR; INTRAVENOUS at 17:22

## 2022-06-16 RX ADMIN — DIPHENHYDRAMINE HYDROCHLORIDE 25 MG: 50 INJECTION, SOLUTION INTRAMUSCULAR; INTRAVENOUS at 17:22

## 2022-06-16 RX ADMIN — PRENATAL VIT W/ FE FUMARATE-FA TAB 27-0.8 MG 1 TABLET: 27-0.8 TAB at 09:21

## 2022-06-16 RX ADMIN — PANTOPRAZOLE SODIUM 40 MG: 40 TABLET, DELAYED RELEASE ORAL at 08:02

## 2022-06-16 RX ADMIN — ACETAMINOPHEN 650 MG: 325 TABLET, FILM COATED ORAL at 01:08

## 2022-06-16 RX ADMIN — NALBUPHINE HYDROCHLORIDE 10 MG: 10 INJECTION, SOLUTION INTRAMUSCULAR; INTRAVENOUS; SUBCUTANEOUS at 01:31

## 2022-06-16 RX ADMIN — ESCITALOPRAM OXALATE 30 MG: 20 TABLET ORAL at 09:21

## 2022-06-16 RX ADMIN — DOCUSATE SODIUM 100 MG: 100 CAPSULE, LIQUID FILLED ORAL at 01:07

## 2022-06-16 RX ADMIN — SENNOSIDES AND DOCUSATE SODIUM 2 TABLET: 8.6; 5 TABLET ORAL at 20:47

## 2022-06-16 RX ADMIN — SENNOSIDES AND DOCUSATE SODIUM 2 TABLET: 8.6; 5 TABLET ORAL at 09:21

## 2022-06-16 RX ADMIN — CYCLOBENZAPRINE 10 MG: 10 TABLET, FILM COATED ORAL at 21:18

## 2022-06-16 RX ADMIN — ACETAMINOPHEN 650 MG: 325 TABLET, FILM COATED ORAL at 17:22

## 2022-06-16 RX ADMIN — HYDROXYZINE HYDROCHLORIDE 100 MG: 50 TABLET, FILM COATED ORAL at 01:12

## 2022-06-16 ASSESSMENT — ACTIVITIES OF DAILY LIVING (ADL)
ADLS_ACUITY_SCORE: 18

## 2022-06-16 NOTE — PROGRESS NOTES
"Report received and patient care assumed at 1930.  Pt on continuous monitoring.  FHR: 120 with moderate variability, 15 x 15 accels, and occasional variable decels noted.  Patient denies abdominal pain.  Head pain rating \"6\" on initial assessment.  No blurred vision or floaters.  Denies RUQ pain.  Denies vag bleeding or LOF.  VS done and CT: high 40's to low 50's.  Into w/c at 2000 and transferred to MRI for scans.  Patient back from MRI at 2040 and into bed and on monitor.  Rating head pain \"8\" when back to room.  EFM applied.  Ice pack provided for head. Noted contractions now every 5-6.  Patient now reporting low abdominal cramping. Pulse rate continues to be 48-50's.  BP: 118/76. Patient requesting to see Dr. Maxwell.  Dr. Maxwell called and above reported.  Orders received for nubain 10mg IV for head pain and hold evening labetolol, BP WNL.  Patient stating some relief.  Will continue to monitor.  Talia Marcial RN on 6/15/2022 at 9:44 PM    "

## 2022-06-16 NOTE — PROGRESS NOTES
OB NOTE    Sleeping  Vss  MRI normal  Evening dose of Labetalol held for bradycardia  No other changes in management    Ruddy Maxwell MD

## 2022-06-16 NOTE — PLAN OF CARE
Problem: Plan of Care - These are the overarching goals to be used throughout the patient stay.    Goal: Plan of Care Review/Shift Note  Description: The Plan of Care Review/Shift note should be completed every shift.  The Outcome Evaluation is a brief statement about your assessment that the patient is improving, declining, or no change.  This information will be displayed automatically on your shift note.  Outcome: Ongoing, Progressing  Flowsheets (Taken 6/16/2022 1422)  Plan of Care Reviewed With: patient  Overall Patient Progress: no change   Shital has been resting today. She received an infusion of Reglan/Benadryl for her headache and she said it helped a little. VSS except bradycardia in high 40's to mid 50's. Labetalol has been held per MD order due to stable BP and low HR. Will continue to assess pain and treat appropriately.

## 2022-06-16 NOTE — PROGRESS NOTES
progress note    ASSESSMENT: PLAN:     the patient is a  who is at 32+3 gestational age.    Admitted for preeclampsia with severe features (although bp have been very stable and within normal limits )  Has had headache throughout pregnancy - had neuro consult yesterday with subsequent normal MRI and MRA.  History of migraines   Discussed with patient that we will not be using narcotics for headaches due to concern for fetal wellbeing given close to delivery and significant IUGR. Have many other modalities to use.  Consider also acupuncture if available.     GBS POSITIVE 22  COVID positive 22    S/p betamethasone course  S/p magnesium for neuroprotection  BPP tomorrow  Will get fetal weight next week with BPP      Subjective:  Still has headache but not too bad.  No other complaints.       Objective:    /80   Pulse 55   Temp 98.1  F (36.7  C)   Resp 16   Wt 87.5 kg (192 lb 12.8 oz)   LMP 2021   SpO2 99%   BMI 31.12 kg/m      FHTS reactive   CONTRACTIONS:  Rare   CERVIX: not checked       See results in labs from yesterday       MD Sharon CassidyWiser [facility]EV[facility]  [phone]
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
## 2022-06-16 NOTE — PLAN OF CARE
"End of shift summary:  Patient reports headache rating \"7-8\" prior to medications.  Received 2 doses of nubain IV over night that were effective in decreasing the head pain to a \"4\".  Encouraged ice and patient declines this morning.  Trying to rest, but states the pain does keep her awake.  Denies vision changes or RUQ pain. Denies abdominal pain or cramping.  Baby movements +, denies LOF or vaginal bleeding.  VSS. Reflex check WNL.  Call light in reach.    Talia Marcial RN on 6/16/2022 at 5:50 AM    Problem: Plan of Care - These are the overarching goals to be used throughout the patient stay.    Goal: Plan of Care Review/Shift Note  Description: The Plan of Care Review/Shift note should be completed every shift.  The Outcome Evaluation is a brief statement about your assessment that the patient is improving, declining, or no change.  This information will be displayed automatically on your shift note.  Outcome: Ongoing, Progressing  Goal: Patient-Specific Goal (Individualized)  Description: You can add care plan individualizations to a care plan. Examples of Individualization might be:  \"Parent requests to be called daily at 9am for status\", \"I have a hard time hearing out of my right ear\", or \"Do not touch me to wake me up as it startles me\".  Outcome: Ongoing, Progressing  Goal: Absence of Hospital-Acquired Illness or Injury  Outcome: Ongoing, Progressing  Intervention: Prevent and Manage VTE (Venous Thromboembolism) Risk  Recent Flowsheet Documentation  Taken 6/16/2022 0532 by Talia Marcial, RN  Activity Management: bedrest with bathroom privileges  Taken 6/15/2022 2251 by Talia Marcial, RN  Activity Management: bedrest with bathroom privileges  Goal: Optimal Comfort and Wellbeing  Outcome: Ongoing, Progressing  Intervention: Monitor Pain and Promote Comfort  Recent Flowsheet Documentation  Taken 6/16/2022 0532 by Talia Marcial, RN  Pain Management Interventions:   environmental changes   therapeutic " presence   therapeutic touch   quiet environment facilitated  Goal: Readiness for Transition of Care  Outcome: Ongoing, Progressing     Problem: Hypertensive Disorders in Pregnancy  Goal: Maternal-Fetal Stabilization  Outcome: Ongoing, Progressing     Problem: Pain Acute  Goal: Acceptable Pain Control and Functional Ability  Outcome: Ongoing, Progressing  Intervention: Develop Pain Management Plan  Recent Flowsheet Documentation  Taken 6/16/2022 0532 by Talia Marcial, RN  Pain Management Interventions:   environmental changes   therapeutic presence   therapeutic touch   quiet environment facilitated

## 2022-06-16 NOTE — UTILIZATION REVIEW
Admission Status; Secondary Review Determination       Under the authority of the Utilization Management Committee, the utilization review process indicated a secondary review on the above patient. The review outcome is based on review of the medical records, discussions with staff, and applying clinical experience noted on the date of the review.     (x) Inpatient Status Appropriate - This patient's medical care is consistent with medical management for inpatient care and reasonable inpatient medical practice.     RATIONALE FOR DETERMINATION     Ms. Tamayo is a 26 yo female pt who is currently 32 wks gestation and recent COVID infiection who was admitted to the hospital for pre-eclampsia with severe features.  Recently admitted and given bethamethasone 5/16-5/17 but then allowed to discharge home.  Returns with fetal tachycardia.  Her clinical status remains guarded; remaining in the hospital for extended fetal monitoring until delivery.  Primary physician feels that she is medically unsafe to discharge.    At the time of admission with the information available to the attending physician more than 2 nights Hospital complex care was anticipated, based on patient risk of adverse outcome if treated as outpatient and complex care required. Inpatient admission is appropriate based on the Medicare guidelines.       The information on this document is developed by the utilization review team in order for the business office to ensure compliance. This only denotes the appropriateness of proper admission status and does not reflect the quality of care rendered.   The definitions of Inpatient Status and Observation Status used in making the determination above are those provided in the CMS Coverage Manual, Chapter 1 and Chapter 6, section 70.4.         Sincerely,     Melly Quiroga, DO  Utilization Review  Physician Advisor  [facility].

## 2022-06-17 ENCOUNTER — OFFICE VISIT (OUTPATIENT)
Dept: MATERNAL FETAL MEDICINE | Facility: HOSPITAL | Age: 27
End: 2022-06-17
Attending: OBSTETRICS & GYNECOLOGY
Payer: MEDICAID

## 2022-06-17 ENCOUNTER — ANCILLARY PROCEDURE (OUTPATIENT)
Dept: ULTRASOUND IMAGING | Facility: HOSPITAL | Age: 27
End: 2022-06-17
Attending: OBSTETRICS & GYNECOLOGY
Payer: MEDICAID

## 2022-06-17 DIAGNOSIS — O36.5990 PREGNANCY AFFECTED BY FETAL GROWTH RESTRICTION: ICD-10-CM

## 2022-06-17 DIAGNOSIS — O14.13 PREECLAMPSIA, SEVERE, THIRD TRIMESTER: Primary | ICD-10-CM

## 2022-06-17 DIAGNOSIS — O14.13 PREECLAMPSIA, SEVERE, THIRD TRIMESTER: ICD-10-CM

## 2022-06-17 LAB
ALT SERPL W P-5'-P-CCNC: 11 U/L (ref 0–45)
AST SERPL W P-5'-P-CCNC: 12 U/L (ref 0–40)
CREAT SERPL-MCNC: 0.66 MG/DL (ref 0.6–1.1)
ERYTHROCYTE [DISTWIDTH] IN BLOOD BY AUTOMATED COUNT: 14 % (ref 10–15)
GFR SERPL CREATININE-BSD FRML MDRD: >90 ML/MIN/1.73M2
HCT VFR BLD AUTO: 33.8 % (ref 35–47)
HGB BLD-MCNC: 11.1 G/DL (ref 11.7–15.7)
MCH RBC QN AUTO: 28.5 PG (ref 26.5–33)
MCHC RBC AUTO-ENTMCNC: 32.8 G/DL (ref 31.5–36.5)
MCV RBC AUTO: 87 FL (ref 78–100)
PLATELET # BLD AUTO: 216 10E3/UL (ref 150–450)
RBC # BLD AUTO: 3.9 10E6/UL (ref 3.8–5.2)
WBC # BLD AUTO: 12.6 10E3/UL (ref 4–11)

## 2022-06-17 PROCEDURE — 84450 TRANSFERASE (AST) (SGOT): CPT | Performed by: OBSTETRICS & GYNECOLOGY

## 2022-06-17 PROCEDURE — 120N000001 HC R&B MED SURG/OB

## 2022-06-17 PROCEDURE — 258N000003 HC RX IP 258 OP 636: Performed by: OBSTETRICS & GYNECOLOGY

## 2022-06-17 PROCEDURE — 82565 ASSAY OF CREATININE: CPT | Performed by: OBSTETRICS & GYNECOLOGY

## 2022-06-17 PROCEDURE — 76819 FETAL BIOPHYS PROFIL W/O NST: CPT | Mod: 26 | Performed by: OBSTETRICS & GYNECOLOGY

## 2022-06-17 PROCEDURE — 99231 SBSQ HOSP IP/OBS SF/LOW 25: CPT | Performed by: NURSE PRACTITIONER

## 2022-06-17 PROCEDURE — 84460 ALANINE AMINO (ALT) (SGPT): CPT | Performed by: OBSTETRICS & GYNECOLOGY

## 2022-06-17 PROCEDURE — 36415 COLL VENOUS BLD VENIPUNCTURE: CPT | Performed by: OBSTETRICS & GYNECOLOGY

## 2022-06-17 PROCEDURE — 76815 OB US LIMITED FETUS(S): CPT | Mod: 26 | Performed by: OBSTETRICS & GYNECOLOGY

## 2022-06-17 PROCEDURE — 999N000127 HC STATISTIC PERIPHERAL IV START W US GUIDANCE

## 2022-06-17 PROCEDURE — 250N000013 HC RX MED GY IP 250 OP 250 PS 637: Performed by: STUDENT IN AN ORGANIZED HEALTH CARE EDUCATION/TRAINING PROGRAM

## 2022-06-17 PROCEDURE — 85027 COMPLETE CBC AUTOMATED: CPT | Performed by: OBSTETRICS & GYNECOLOGY

## 2022-06-17 PROCEDURE — 76820 UMBILICAL ARTERY ECHO: CPT | Mod: 26 | Performed by: OBSTETRICS & GYNECOLOGY

## 2022-06-17 PROCEDURE — 76819 FETAL BIOPHYS PROFIL W/O NST: CPT

## 2022-06-17 RX ADMIN — ESCITALOPRAM OXALATE 30 MG: 20 TABLET ORAL at 08:15

## 2022-06-17 RX ADMIN — PANTOPRAZOLE SODIUM 40 MG: 40 TABLET, DELAYED RELEASE ORAL at 08:14

## 2022-06-17 RX ADMIN — SODIUM CHLORIDE, POTASSIUM CHLORIDE, SODIUM LACTATE AND CALCIUM CHLORIDE 1000 ML: 600; 310; 30; 20 INJECTION, SOLUTION INTRAVENOUS at 06:37

## 2022-06-17 RX ADMIN — HYDROXYZINE HYDROCHLORIDE 100 MG: 50 TABLET, FILM COATED ORAL at 23:45

## 2022-06-17 RX ADMIN — PRENATAL VIT W/ FE FUMARATE-FA TAB 27-0.8 MG 1 TABLET: 27-0.8 TAB at 08:13

## 2022-06-17 ASSESSMENT — ACTIVITIES OF DAILY LIVING (ADL)
ADLS_ACUITY_SCORE: 18

## 2022-06-17 NOTE — PROGRESS NOTES
progress note     ASSESSMENT: PLAN:      the patient is a  who is at 32+4 gestational age.    Admitted for preeclampsia with severe features (although bp have been very stable and within normal limits )  Has had headache throughout pregnancy - had neuro consult yesterday with subsequent normal MRI and MRA.  History of migraines   Discussed with patient that we will not be using narcotics for headaches due to concern for fetal wellbeing given close to delivery and significant IUGR. Have many other modalities to use.  Consider also acupuncture if available.      GBS POSITIVE 22  COVID positive 22     S/p betamethasone course  S/p magnesium for neuroprotection    Will get fetal weight next week with BPP      BPP TODAY    Subjective:  No complaints today, stated she slept well       Objective:     /75   Pulse 55   Temp 97.8  F (36.6  C) (Oral)   Resp 16   Wt 87.5 kg (192 lb 12.8 oz)   LMP 2021   SpO2 98%   BMI 31.12 kg/m      FHTS reactive   CONTRACTIONS:  Rare   CERVIX: not checked

## 2022-06-17 NOTE — PROGRESS NOTES
RN updated Dr. Benton on recent Bps/vitals and BPP 8/8 per [facility] tech. NNP consult at bedside. Pt okay to be off monitor take wheelchair to [facility] for tour.     BPS/vitals stable and pt denies HA today. Reflexes WNL. Pt continues to report irregular mild abdominal cramping. RN palpating mild contractions occasionally.

## 2022-06-17 NOTE — PROGRESS NOTES
"Requested the \"med I had yesterday\" for headache. Was given flexaril, spoke to Dr Mcginnis to report BP (hold labetalol) and ask about the flexaril, ok to give. Pt wants to ride in WC around in unit, also ok to do so. Reports HA still there but less.   "

## 2022-06-17 NOTE — PLAN OF CARE
Problem: Plan of Care - These are the overarching goals to be used throughout the patient stay.    Goal: Optimal Comfort and Wellbeing  6/17/2022 0724 by Nadya Beltrán, RN  Outcome: Ongoing, Progressing  6/17/2022 0723 by Nadya Beltrán, RN  Outcome: Ongoing, Progressing   Has been more comfortable tonight, slept most of nite.

## 2022-06-17 NOTE — PROGRESS NOTES
RN update Dr. Mcginnis on morning /89, P51. Okay to hold Labetalol at this time. RN will reassess vitals per order and update Dr. Benton as needed. Pt denies HA, changes in vision, RUQ pain at this time. Pt reports mild occasional abdominal cramping. FHR cat 1 tracing after IVF bolus this am. Pt remain on continuous monitoring at this time. RN assisting pt with cares as needed.

## 2022-06-17 NOTE — CONSULTS
Assessment     32 4/7 weeks gestation  with preeclampsia with severe features   Principal Problem: Maternal Preeclampsia             Consult:   I have been requested by Dr. Mcginnis to speak with mother, who is a 27y.o. year old  about possible  birth in the setting of [facility].     She was admitted [facility] due to pre-eclampsia with severe feathres.  She received betamethasone on  & .  She received mag sulfate as well  and is stable.  BPP to be completed today.     Shital expressed concern for her baby if shedelivered at her current gestation.  We discussed the following common problems of a 32 week premature baby:  1. Immature lungs with probable need fo respiratory support with cpap and possibly a need for surfactant viaan ETT and vent support    2. Infection risk.  She understands that her baby may be on antibiotics for at least 48 hours while infection is ruled out and this will require an IV.  3. Hyperbilirubinemia with highprobability of phototherapy.  4. Temperature instability and need for exogenous heat source via isolette.  5. Hypoglycemia.  We discussed that glucose is main source of energy for her baby's brain and that somepremature babies may not be able to regulate blood sugar effectively without IV dextrose. We also discussed need for IV fluids and possible central line depending on respiratory status and feeding tolerance.  6. Feedingdifficulties related to immaturity and definite need for nasogastric tube for feedings until baby is mature enough to coordinate his suck/swallow/breath and gain endurance to finish full feeds to take in enough fluid andcalories.    7. Apnea related to prematurity and that we will monitor saturations as well as heart rate and respirations.  We discussed caffeine as a CNS stimulant to prevent pauses in breathing.  8. Dischargecriteria: temperature stability without exogenous heat source; all feedings by mouth (breast or  bottle) with wt gain; no pauses in breathing, passing of CST.  9. Benefits of breast milk discussed, along withpumping/storage of milk. Availability and use of Donor Breast Milk discussed. Lactation support is available for her.     This is a baby boy and his name is Neal.  Mother agreed to vitamin K, erythromycin ppx for eyes, and hepatitis B vaccination after delivery.     Mother has not decided if she will breast feed versus formula feed. She is does consents to use of donor breastmilk.            Pertinent Labs  Blood type: A pos/Ab neg  Hep B Surface antigen neg  Rubella Immune  RPR NR  HIV neg  GBS unknown      PCP after discharge: Undecided       Rosenda Davis CNP on 6/17/2022 at 1:48 PM

## 2022-06-18 LAB
ALBUMIN MFR UR ELPH: <7 MG/DL
ALT SERPL W P-5'-P-CCNC: 15 U/L (ref 0–45)
AST SERPL W P-5'-P-CCNC: 14 U/L (ref 0–40)
CREAT UR-MCNC: 26 MG/DL
ERYTHROCYTE [DISTWIDTH] IN BLOOD BY AUTOMATED COUNT: 14.1 % (ref 10–15)
HCT VFR BLD AUTO: 33 % (ref 35–47)
HGB BLD-MCNC: 10.9 G/DL (ref 11.7–15.7)
HOLD SPECIMEN: NORMAL
MCH RBC QN AUTO: 29.1 PG (ref 26.5–33)
MCHC RBC AUTO-ENTMCNC: 33 G/DL (ref 31.5–36.5)
MCV RBC AUTO: 88 FL (ref 78–100)
PLATELET # BLD AUTO: 197 10E3/UL (ref 150–450)
PROT/CREAT 24H UR: NORMAL MG/G{CREAT}
RBC # BLD AUTO: 3.75 10E6/UL (ref 3.8–5.2)
WBC # BLD AUTO: 10.9 10E3/UL (ref 4–11)

## 2022-06-18 PROCEDURE — 36415 COLL VENOUS BLD VENIPUNCTURE: CPT | Performed by: OBSTETRICS & GYNECOLOGY

## 2022-06-18 PROCEDURE — 258N000003 HC RX IP 258 OP 636: Performed by: OBSTETRICS & GYNECOLOGY

## 2022-06-18 PROCEDURE — 84450 TRANSFERASE (AST) (SGOT): CPT | Performed by: OBSTETRICS & GYNECOLOGY

## 2022-06-18 PROCEDURE — 250N000013 HC RX MED GY IP 250 OP 250 PS 637: Performed by: STUDENT IN AN ORGANIZED HEALTH CARE EDUCATION/TRAINING PROGRAM

## 2022-06-18 PROCEDURE — 84156 ASSAY OF PROTEIN URINE: CPT | Performed by: OBSTETRICS & GYNECOLOGY

## 2022-06-18 PROCEDURE — 84460 ALANINE AMINO (ALT) (SGPT): CPT | Performed by: OBSTETRICS & GYNECOLOGY

## 2022-06-18 PROCEDURE — 250N000011 HC RX IP 250 OP 636: Performed by: OBSTETRICS & GYNECOLOGY

## 2022-06-18 PROCEDURE — 120N000001 HC R&B MED SURG/OB

## 2022-06-18 PROCEDURE — 85027 COMPLETE CBC AUTOMATED: CPT | Performed by: OBSTETRICS & GYNECOLOGY

## 2022-06-18 RX ORDER — HYDROMORPHONE HCL IN WATER/PF 6 MG/30 ML
0.2 PATIENT CONTROLLED ANALGESIA SYRINGE INTRAVENOUS ONCE
Status: COMPLETED | OUTPATIENT
Start: 2022-06-18 | End: 2022-06-18

## 2022-06-18 RX ADMIN — ESCITALOPRAM OXALATE 30 MG: 20 TABLET ORAL at 09:36

## 2022-06-18 RX ADMIN — SODIUM CHLORIDE, POTASSIUM CHLORIDE, SODIUM LACTATE AND CALCIUM CHLORIDE 1000 ML: 600; 310; 30; 20 INJECTION, SOLUTION INTRAVENOUS at 18:47

## 2022-06-18 RX ADMIN — HYDROMORPHONE HYDROCHLORIDE 0.2 MG: 0.2 INJECTION, SOLUTION INTRAMUSCULAR; INTRAVENOUS; SUBCUTANEOUS at 20:53

## 2022-06-18 RX ADMIN — PRENATAL VIT W/ FE FUMARATE-FA TAB 27-0.8 MG 1 TABLET: 27-0.8 TAB at 09:42

## 2022-06-18 RX ADMIN — CYCLOBENZAPRINE 10 MG: 10 TABLET, FILM COATED ORAL at 17:22

## 2022-06-18 RX ADMIN — SENNOSIDES AND DOCUSATE SODIUM 1 TABLET: 8.6; 5 TABLET ORAL at 09:39

## 2022-06-18 RX ADMIN — PROCHLORPERAZINE EDISYLATE 5 MG: 5 INJECTION INTRAMUSCULAR; INTRAVENOUS at 21:26

## 2022-06-18 RX ADMIN — SENNOSIDES AND DOCUSATE SODIUM 2 TABLET: 8.6; 5 TABLET ORAL at 20:53

## 2022-06-18 RX ADMIN — PANTOPRAZOLE SODIUM 40 MG: 40 TABLET, DELAYED RELEASE ORAL at 07:52

## 2022-06-18 ASSESSMENT — ACTIVITIES OF DAILY LIVING (ADL)
ADLS_ACUITY_SCORE: 18

## 2022-06-18 NOTE — PROGRESS NOTES
Dr Rios in unit. Verbal order to hold morning Labetalol d/t /74. Shital denies any headache, vision changes or R upper quadrant pain.

## 2022-06-18 NOTE — PROGRESS NOTES
Progress Note    Reports Migraine headache similar to other migraine headaches. Denies changes in vision or flashing lights. Does report right upper quadrant discomfort that just began. Denies contractions, vaginal bleeding or loss of fluid. + FM.     Temp:  [97.7  F (36.5  C)-98.4  F (36.9  C)] 98.4  F (36.9  C)  Pulse:  [48-78] 65  Resp:  [18-20] 20  BP: ()/(57-91) 118/71  SpO2:  [99 %-100 %] 100 %    NAD, AAO x 3  Abdomen soft, non tender    A/P: 26 yo admitted with preeclampsia with severe features with pregnancy complicated by Migraines  -- Migraine headache. BP currently normotensive. Will check labs now. Neurology was consulted 6/16. Will touch base with them regarding possible treatment options.   -- Will monitor closely   -- She does have preeclampsia if headache does not resolve will consider delivery    Maru Rios MD  (P) [phone]

## 2022-06-18 NOTE — PROGRESS NOTES
OB ANTEPARTUM PROGRESS NOTE     at 32w5d, admitted for preeclampsia with severe features   Migraines    SUBJECTIVE:  Patient feels well today.  Denies headaches or visual changes.  No RUQ pain.  Baby is moving well.      OBJECTIVE:  BP (!) 137/91 (BP Location: Right arm)   Pulse (!) 48   Temp 97.7  F (36.5  C) (Oral)   Resp 18   Wt 87.5 kg (192 lb 12.8 oz)   LMP 2021   SpO2 99%   BMI 31.12 kg/m     Abd: gravid  NST: Reactive   Holly: Acontractile     LABS:     Latest Reference Range & Units 22 00:32   Creatinine 0.60 - 1.10 mg/dL 0.66   GFR Estimate >60 mL/min/1.73m2 >90 [1]   ALT 0 - 45 U/L 11   AST 0 - 40 U/L 12   WBC 4.0 - 11.0 10e3/uL 12.6 (H)   Hemoglobin 11.7 - 15.7 g/dL 11.1 (L)   Hematocrit 35.0 - 47.0 % 33.8 (L)   Platelet Count 150 - 450 10e3/uL 216   RBC Count 3.80 - 5.20 10e6/uL 3.90   MCV 78 - 100 fL 87   MCH 26.5 - 33.0 pg 28.5   MCHC 31.5 - 36.5 g/dL 32.8   RDW 10.0 - 15.0 % 14.0   (H): Data is abnormally high  (L): Data is abnormally low  [1] Effective 2021 eGFRcr in adults is calculated using the  CKD-EPI creatinine equation which includes age and gender (Genaro alvarez al., NEJ, DOI: 10.1056/XICGjh7649145)    OB LABS:   GBS positive     MEDICATIONS:    Current Facility-Administered Medications:      acetaminophen (TYLENOL) tablet 325-650 mg, 325-650 mg, Oral, Q6H PRN, Jing Garica MD     acetaminophen (TYLENOL) tablet 650 mg, 650 mg, Oral, Q4H PRN, Jing Garcia MD, 650 mg at 22 1722     alum & mag hydroxide-simethicone (MAALOX) suspension 30 mL, 30 mL, Oral, Once PRN, Jing Garcia MD     [START ON 2022] betamethasone acet & sod phos (CELESTONE) injection 12 mg, 12 mg, Intramuscular, Q24H, Becki Nieves MD     cyclobenzaprine (FLEXERIL) tablet 10 mg, 10 mg, Oral, Q8H PRN, Jing Garcia MD, 10 mg at 22     diphenhydrAMINE (BENADRYL) capsule 25 mg, 25 mg, Oral, Q6H PRN **OR** diphenhydrAMINE (BENADRYL) injection 25  mg, 25 mg, Intravenous, Q6H PRN, Jing Garcia MD, 25 mg at 06/16/22 1722     docusate sodium (COLACE) capsule 100 mg, 100 mg, Oral, BID PRN, Jing Garcia MD, 100 mg at 06/16/22 0107     escitalopram (LEXAPRO) tablet 30 mg, 30 mg, Oral, Daily, Jing Garcia MD, 30 mg at 06/17/22 0815     hydrALAZINE (APRESOLINE) injection 5-10 mg, 5-10 mg, Intravenous, Q20 Min PRN, Jing Garcia MD     hydrOXYzine (ATARAX) tablet 100 mg, 100 mg, Oral, TID PRN, Jing Garcia MD, 100 mg at 06/17/22 2345     labetalol (NORMODYNE) tablet 100 mg, 100 mg, Oral, Q12H, Jing Garcia MD     labetalol (NORMODYNE/TRANDATE) injection 20-40 mg, 20-40 mg, Intravenous, Q10 Min PRN, Jing Garcia MD     LORazepam (ATIVAN) injection 2 mg, 2 mg, Intravenous, Q3 Min PRN, Jing Garcia MD     magnesium sulfate 2 g in water intermittent infusion, 2 g, Intravenous, Once PRN seizures, Jing Garcia MD     magnesium sulfate 4 g in 50 mL sterile water (premade), 4 g, Intravenous, Once PRN seizures, Jing Garcia MD     magnesium sulfate injection 10 g, 10 g, Intramuscular, Once PRN, Jing Garcia MD     metoclopramide (REGLAN) injection 10 mg, 10 mg, Intravenous, Q6H PRN, 10 mg at 06/16/22 1722 **OR** metoclopramide (REGLAN) tablet 10 mg, 10 mg, Oral, Q6H PRN, Jing Garcia MD     No Tdap Needed - Assessment: Patient does not need Tdap vaccine, , Does not apply, Continuous PRN, Jing Garcia MD     pantoprazole (PROTONIX) EC tablet 40 mg, 40 mg, Oral, QAM AC, Jing Garcia MD, 40 mg at 06/17/22 0814     polyethylene glycol (MIRALAX) Packet 17 g, 17 g, Oral, Daily PRN, Jing Garcia MD     prenatal multivitamin w/iron per tablet 1 tablet, 1 tablet, Oral, Daily, Jing Garcia MD, 1 tablet at 06/17/22 0813     prochlorperazine (COMPAZINE) injection 10 mg, 10 mg, Intravenous, Q6H PRN **OR** prochlorperazine (COMPAZINE) tablet 10 mg, 10 mg, Oral, Q6H PRN **OR** prochlorperazine (COMPAZINE)  suppository 25 mg, 25 mg, Rectal, Q12H PRN, Jing Garcia MD     senna-docusate (SENOKOT-S/PERICOLACE) 8.6-50 MG per tablet 1 tablet, 1 tablet, Oral, BID **OR** senna-docusate (SENOKOT-S/PERICOLACE) 8.6-50 MG per tablet 2 tablet, 2 tablet, Oral, BID, Jing Garcia MD, 2 tablet at 22     simethicone (MYLICON) chewable tablet 160 mg, 160 mg, Oral, Q4H PRN, Jing Garcia MD     sodium chloride (PF) 0.9% PF flush 3 mL, 3 mL, Intracatheter, Q8H, Jing Garcia MD, 3 mL at 22 0400    ASSESSMENT:  27 year [id number] at 32w5d   Preeclampsia with severe features. Admission until delivery at 34 weeks     PLAN:    1. Vitals: wnl   - labetalol held overnight due to low BPs  2. FHT: Reassuring  - NST Q shift   3. Preeclampsia with severe features  - Reviewed plan for IOL at 34 weeks ()  - Continue labetalol 100mg BID to temporize until delivery   - Labs q72h   - Plan for magnesium for 24h PP   4. Prematurity   - s/p BTMZ -. Plan for repeat dose prior to delivery (ordered for -)  - s/p NICU consult   5. Severe FGR: 30w EFW <1%ile with normal dopplers   - Continue weekly dopplers with MFM and q3w growths   6. Migraines  - s/p Neuro consult  - s/p normal MRI   - Will contact neuro if tylenol and benadryl do not resolve symptoms. Shital has been using percocet and oxycodone at home for migraines  7. Anxiety: continue home lexapro   8. Routine care  - up ad sapna  - General diet  - SCDs while in bed      Becki Nieves MD

## 2022-06-18 NOTE — PROGRESS NOTES
Shital has a migraine and states that Flexeril did not help. Pt also has new r sided pain, vital signs stable. External monitors placed, abdomen soft, nontender. Dr Rios at bedside. Plan to consult with neurology for treatment plan for migraine pain. Lights dimmed, shades pulled, quiet environment promoted. See orders.

## 2022-06-18 NOTE — PLAN OF CARE
"End of shift summary:  Report received at 1930 and patient care assumed.  Patient resting in bed with significant other at bedside.  No complaint of pain or discomfort.  VSS. Plan to monitor at 2300 for NST.  @2300  NST reactive.  EFM removed for sleep.  Patient medicated with vistaril 100mg PO for rest.  VSS.  No complaints.  @0545 patient awoke for vitals AR: 48.  BP: 137/91, +baby movements, and states sleeping most of the night.  No complaints of pain, vision changes, RUQ pain.  Reflexes WNL.  Talia Marcial RN on 6/18/2022 at 6:06 AM    Problem: Plan of Care - These are the overarching goals to be used throughout the patient stay.    Goal: Plan of Care Review/Shift Note  Description: The Plan of Care Review/Shift note should be completed every shift.  The Outcome Evaluation is a brief statement about your assessment that the patient is improving, declining, or no change.  This information will be displayed automatically on your shift note.  6/18/2022 0602 by Talia Marcial RN  Outcome: Ongoing, Progressing  6/17/2022 2353 by Talia Marcial RN  Outcome: Ongoing, Progressing  Goal: Patient-Specific Goal (Individualized)  Description: You can add care plan individualizations to a care plan. Examples of Individualization might be:  \"Parent requests to be called daily at 9am for status\", \"I have a hard time hearing out of my right ear\", or \"Do not touch me to wake me up as it startles me\".  6/18/2022 0602 by Talia Marcial RN  Outcome: Ongoing, Progressing  6/17/2022 2353 by Talia Marcial RN  Outcome: Ongoing, Progressing  Goal: Absence of Hospital-Acquired Illness or Injury  6/18/2022 0602 by Talia Marcial RN  Outcome: Ongoing, Progressing  6/17/2022 2353 by Talia Marcial RN  Outcome: Ongoing, Progressing  Intervention: Prevent and Manage VTE (Venous Thromboembolism) Risk  Recent Flowsheet Documentation  Taken 6/17/2022 2309 by Talia Marcial RN  Activity Management: bedrest with bathroom " privileges  Goal: Optimal Comfort and Wellbeing  6/18/2022 0602 by Talia Marcial RN  Outcome: Ongoing, Progressing  6/17/2022 2353 by Talia Marcial RN  Outcome: Ongoing, Progressing  Goal: Readiness for Transition of Care  6/18/2022 0602 by Talia Marcial RN  Outcome: Ongoing, Progressing  6/17/2022 2353 by Talia Marcial RN  Outcome: Ongoing, Progressing     Problem: Hypertensive Disorders in Pregnancy  Goal: Maternal-Fetal Stabilization  6/18/2022 0602 by Talia Marcial RN  Outcome: Ongoing, Progressing  6/17/2022 2353 by Talia Marcial RN  Outcome: Ongoing, Progressing     Problem: Pain Acute  Goal: Acceptable Pain Control and Functional Ability  6/18/2022 0602 by Talia Marcial RN  Outcome: Ongoing, Progressing  6/17/2022 2353 by Talia Marcial RN  Outcome: Ongoing, Progressing

## 2022-06-19 LAB
ERYTHROCYTE [DISTWIDTH] IN BLOOD BY AUTOMATED COUNT: 13.9 % (ref 10–15)
HCT VFR BLD AUTO: 31.5 % (ref 35–47)
HGB BLD-MCNC: 10.4 G/DL (ref 11.7–15.7)
MCH RBC QN AUTO: 28.3 PG (ref 26.5–33)
MCHC RBC AUTO-ENTMCNC: 33 G/DL (ref 31.5–36.5)
MCV RBC AUTO: 86 FL (ref 78–100)
PLATELET # BLD AUTO: 172 10E3/UL (ref 150–450)
RBC # BLD AUTO: 3.67 10E6/UL (ref 3.8–5.2)
WBC # BLD AUTO: 13.1 10E3/UL (ref 4–11)

## 2022-06-19 PROCEDURE — 36415 COLL VENOUS BLD VENIPUNCTURE: CPT | Performed by: OBSTETRICS & GYNECOLOGY

## 2022-06-19 PROCEDURE — 84450 TRANSFERASE (AST) (SGOT): CPT | Performed by: OBSTETRICS & GYNECOLOGY

## 2022-06-19 PROCEDURE — 84460 ALANINE AMINO (ALT) (SGPT): CPT | Performed by: OBSTETRICS & GYNECOLOGY

## 2022-06-19 PROCEDURE — 250N000013 HC RX MED GY IP 250 OP 250 PS 637: Performed by: STUDENT IN AN ORGANIZED HEALTH CARE EDUCATION/TRAINING PROGRAM

## 2022-06-19 PROCEDURE — 82565 ASSAY OF CREATININE: CPT | Performed by: OBSTETRICS & GYNECOLOGY

## 2022-06-19 PROCEDURE — 120N000001 HC R&B MED SURG/OB

## 2022-06-19 PROCEDURE — 85027 COMPLETE CBC AUTOMATED: CPT | Performed by: OBSTETRICS & GYNECOLOGY

## 2022-06-19 RX ADMIN — HYDROXYZINE HYDROCHLORIDE 100 MG: 50 TABLET, FILM COATED ORAL at 11:37

## 2022-06-19 RX ADMIN — PRENATAL VIT W/ FE FUMARATE-FA TAB 27-0.8 MG 1 TABLET: 27-0.8 TAB at 09:30

## 2022-06-19 RX ADMIN — ESCITALOPRAM OXALATE 30 MG: 20 TABLET ORAL at 09:29

## 2022-06-19 RX ADMIN — CYCLOBENZAPRINE 10 MG: 10 TABLET, FILM COATED ORAL at 11:37

## 2022-06-19 RX ADMIN — SENNOSIDES AND DOCUSATE SODIUM 1 TABLET: 8.6; 5 TABLET ORAL at 09:30

## 2022-06-19 RX ADMIN — HYDROXYZINE HYDROCHLORIDE 100 MG: 50 TABLET, FILM COATED ORAL at 20:39

## 2022-06-19 RX ADMIN — PANTOPRAZOLE SODIUM 40 MG: 40 TABLET, DELAYED RELEASE ORAL at 07:45

## 2022-06-19 ASSESSMENT — ACTIVITIES OF DAILY LIVING (ADL)
ADLS_ACUITY_SCORE: 18

## 2022-06-19 NOTE — PROGRESS NOTES
Progress note    Headache improved with IV fluids.     Temp:  [97.7  F (36.5  C)-98.4  F (36.9  C)] 98.4  F (36.9  C)  Pulse:  [48-78] 65  Resp:  [18-20] 20  BP: ()/(57-91) 118/71  SpO2:  [99 %-100 %] 100 %    NAD, AAO x 3    FHTs: 120s, Mod BTBV, + accels, 1 mild variable decel    H/H: 10.9/33  Plts: 197  ALT 15  AST 14    A/P: 28 yo  at 32w 5d admitted with preeclampsia with severe features with pregnancy complicated by migraine headaches  -- Headache - Have been unable to get a hold of Neurology. Headache is improved with IV fluids. Will give IV compazine and IV dilaudid and monitor. Will monitor closely   -- Continuous monitoring    Maru Rios MD  (P) [phone]

## 2022-06-19 NOTE — PROGRESS NOTES
Late entry:    Pt reported migraine HA to be starting around 1130. Pt given PRN Flexeril and Atarax. Pt reports having relief one hour post and pt resting. No further complaints or concerns at this time. VSS.

## 2022-06-19 NOTE — PROGRESS NOTES
Progress Note    ASSESSMENT: PLAN:   IUP at 32+6  Preeclampsia with severe features-bps currently stable and have not required any intervention for last 2-3 days  Headache- did receive dilaudid yesterday.  Has history of oxycodone use for headaches  IUGR- <1%, will get growth US on 22- last BPP  with normal SALVADOR    S/p betamethasone- consider redose?  S/p magnesium for neuroprotection      SUBJECTIVE:  Feeling much better, sitting up in bed eating breakfast.    OBJECTIVE:  /71   Pulse 65   Temp 98.2  F (36.8  C) (Oral)   Resp 20   Wt 87.5 kg (192 lb 12.8 oz)   LMP 2021   SpO2 100%   BMI 31.12 kg/m      Category 1 tracing  toco- patient is having q5-10 contractions but are very mild- patient does not feel them    Jing Mcginnis MD  [facility]  [phone]

## 2022-06-20 LAB
ALT SERPL W P-5'-P-CCNC: 10 U/L (ref 0–45)
AST SERPL W P-5'-P-CCNC: 13 U/L (ref 0–40)
CREAT SERPL-MCNC: 0.63 MG/DL (ref 0.6–1.1)
GFR SERPL CREATININE-BSD FRML MDRD: >90 ML/MIN/1.73M2

## 2022-06-20 PROCEDURE — 250N000013 HC RX MED GY IP 250 OP 250 PS 637: Performed by: OBSTETRICS & GYNECOLOGY

## 2022-06-20 PROCEDURE — 250N000011 HC RX IP 250 OP 636: Performed by: OBSTETRICS & GYNECOLOGY

## 2022-06-20 PROCEDURE — 250N000013 HC RX MED GY IP 250 OP 250 PS 637: Performed by: STUDENT IN AN ORGANIZED HEALTH CARE EDUCATION/TRAINING PROGRAM

## 2022-06-20 PROCEDURE — 120N000001 HC R&B MED SURG/OB

## 2022-06-20 PROCEDURE — 250N000011 HC RX IP 250 OP 636: Performed by: STUDENT IN AN ORGANIZED HEALTH CARE EDUCATION/TRAINING PROGRAM

## 2022-06-20 PROCEDURE — 258N000003 HC RX IP 258 OP 636: Performed by: OBSTETRICS & GYNECOLOGY

## 2022-06-20 RX ORDER — MAGNESIUM SULFATE 4 G/50ML
4 INJECTION INTRAVENOUS ONCE
Status: COMPLETED | OUTPATIENT
Start: 2022-06-20 | End: 2022-06-20

## 2022-06-20 RX ORDER — HYDRALAZINE HYDROCHLORIDE 20 MG/ML
5-10 INJECTION INTRAMUSCULAR; INTRAVENOUS
Status: DISCONTINUED | OUTPATIENT
Start: 2022-06-20 | End: 2022-06-24 | Stop reason: HOSPADM

## 2022-06-20 RX ORDER — NALOXONE HYDROCHLORIDE 0.4 MG/ML
0.4 INJECTION, SOLUTION INTRAMUSCULAR; INTRAVENOUS; SUBCUTANEOUS
Status: DISCONTINUED | OUTPATIENT
Start: 2022-06-20 | End: 2022-06-24 | Stop reason: HOSPADM

## 2022-06-20 RX ORDER — TERBUTALINE SULFATE 1 MG/ML
0.25 INJECTION, SOLUTION SUBCUTANEOUS
Status: DISCONTINUED | OUTPATIENT
Start: 2022-06-20 | End: 2022-06-21

## 2022-06-20 RX ORDER — SODIUM CHLORIDE, SODIUM LACTATE, POTASSIUM CHLORIDE, CALCIUM CHLORIDE 600; 310; 30; 20 MG/100ML; MG/100ML; MG/100ML; MG/100ML
10-125 INJECTION, SOLUTION INTRAVENOUS CONTINUOUS
Status: DISCONTINUED | OUTPATIENT
Start: 2022-06-20 | End: 2022-06-24 | Stop reason: HOSPADM

## 2022-06-20 RX ORDER — NALOXONE HYDROCHLORIDE 0.4 MG/ML
0.2 INJECTION, SOLUTION INTRAMUSCULAR; INTRAVENOUS; SUBCUTANEOUS
Status: DISCONTINUED | OUTPATIENT
Start: 2022-06-20 | End: 2022-06-24 | Stop reason: HOSPADM

## 2022-06-20 RX ORDER — LABETALOL HYDROCHLORIDE 5 MG/ML
20-40 INJECTION, SOLUTION INTRAVENOUS EVERY 10 MIN PRN
Status: DISCONTINUED | OUTPATIENT
Start: 2022-06-20 | End: 2022-06-24 | Stop reason: HOSPADM

## 2022-06-20 RX ORDER — BETAMETHASONE SODIUM PHOSPHATE AND BETAMETHASONE ACETATE 3; 3 MG/ML; MG/ML
12 INJECTION, SUSPENSION INTRA-ARTICULAR; INTRALESIONAL; INTRAMUSCULAR; SOFT TISSUE EVERY 24 HOURS
Status: COMPLETED | OUTPATIENT
Start: 2022-06-20 | End: 2022-06-21

## 2022-06-20 RX ORDER — MAGNESIUM SULFATE IN WATER 40 MG/ML
1 INJECTION, SOLUTION INTRAVENOUS CONTINUOUS
Status: DISCONTINUED | OUTPATIENT
Start: 2022-06-20 | End: 2022-06-22

## 2022-06-20 RX ORDER — MAGNESIUM SULFATE 4 G/50ML
4 INJECTION INTRAVENOUS
Status: DISCONTINUED | OUTPATIENT
Start: 2022-06-20 | End: 2022-06-22

## 2022-06-20 RX ORDER — LORAZEPAM 2 MG/ML
2 INJECTION INTRAMUSCULAR
Status: DISCONTINUED | OUTPATIENT
Start: 2022-06-20 | End: 2022-06-24 | Stop reason: HOSPADM

## 2022-06-20 RX ORDER — CALCIUM GLUCONATE 94 MG/ML
1 INJECTION, SOLUTION INTRAVENOUS
Status: DISCONTINUED | OUTPATIENT
Start: 2022-06-20 | End: 2022-06-24 | Stop reason: HOSPADM

## 2022-06-20 RX ORDER — MAGNESIUM SULFATE HEPTAHYDRATE 40 MG/ML
2 INJECTION, SOLUTION INTRAVENOUS
Status: DISCONTINUED | OUTPATIENT
Start: 2022-06-20 | End: 2022-06-22

## 2022-06-20 RX ORDER — HYDROXYZINE HYDROCHLORIDE 50 MG/1
100 TABLET, FILM COATED ORAL
Status: DISCONTINUED | OUTPATIENT
Start: 2022-06-20 | End: 2022-06-24 | Stop reason: HOSPADM

## 2022-06-20 RX ORDER — MORPHINE SULFATE 10 MG/ML
10 INJECTION, SOLUTION INTRAMUSCULAR; INTRAVENOUS EVERY 4 HOURS PRN
Status: DISCONTINUED | OUTPATIENT
Start: 2022-06-20 | End: 2022-06-24 | Stop reason: HOSPADM

## 2022-06-20 RX ORDER — MAGNESIUM SULFATE HEPTAHYDRATE 500 MG/ML
10 INJECTION, SOLUTION INTRAMUSCULAR; INTRAVENOUS
Status: DISCONTINUED | OUTPATIENT
Start: 2022-06-20 | End: 2022-06-22

## 2022-06-20 RX ADMIN — CYCLOBENZAPRINE 10 MG: 10 TABLET, FILM COATED ORAL at 19:44

## 2022-06-20 RX ADMIN — MAGNESIUM SULFATE HEPTAHYDRATE 4 G: 80 INJECTION, SOLUTION INTRAVENOUS at 16:45

## 2022-06-20 RX ADMIN — MAGNESIUM SULFATE IN WATER 2 G/HR: 40 INJECTION, SOLUTION INTRAVENOUS at 17:19

## 2022-06-20 RX ADMIN — ACETAMINOPHEN 650 MG: 325 TABLET, FILM COATED ORAL at 11:48

## 2022-06-20 RX ADMIN — DINOPROSTONE 10 MG: 10 INSERT VAGINAL at 14:00

## 2022-06-20 RX ADMIN — SODIUM CHLORIDE, POTASSIUM CHLORIDE, SODIUM LACTATE AND CALCIUM CHLORIDE 25 ML/HR: 600; 310; 30; 20 INJECTION, SOLUTION INTRAVENOUS at 16:47

## 2022-06-20 RX ADMIN — CYCLOBENZAPRINE 10 MG: 10 TABLET, FILM COATED ORAL at 08:39

## 2022-06-20 RX ADMIN — METOCLOPRAMIDE HYDROCHLORIDE 10 MG: 5 INJECTION INTRAMUSCULAR; INTRAVENOUS at 22:05

## 2022-06-20 RX ADMIN — ACETAMINOPHEN 650 MG: 325 TABLET, FILM COATED ORAL at 19:38

## 2022-06-20 RX ADMIN — ESCITALOPRAM OXALATE 30 MG: 20 TABLET ORAL at 08:40

## 2022-06-20 RX ADMIN — PANTOPRAZOLE SODIUM 40 MG: 40 TABLET, DELAYED RELEASE ORAL at 07:51

## 2022-06-20 RX ADMIN — BETAMETHASONE ACETATE AND BETAMETHASONE SODIUM PHOSPHATE 12 MG: 3; 3 INJECTION, SUSPENSION INTRA-ARTICULAR; INTRALESIONAL; INTRAMUSCULAR; SOFT TISSUE at 13:40

## 2022-06-20 RX ADMIN — HYDROXYZINE HYDROCHLORIDE 100 MG: 50 TABLET, FILM COATED ORAL at 22:13

## 2022-06-20 RX ADMIN — PRENATAL VIT W/ FE FUMARATE-FA TAB 27-0.8 MG 1 TABLET: 27-0.8 TAB at 08:40

## 2022-06-20 RX ADMIN — MORPHINE SULFATE 10 MG: 10 INJECTION INTRAVENOUS at 22:09

## 2022-06-20 RX ADMIN — SENNOSIDES AND DOCUSATE SODIUM 1 TABLET: 8.6; 5 TABLET ORAL at 08:40

## 2022-06-20 RX ADMIN — LABETALOL HYDROCHLORIDE 100 MG: 100 TABLET, FILM COATED ORAL at 22:14

## 2022-06-20 RX ADMIN — LABETALOL HYDROCHLORIDE 100 MG: 100 TABLET, FILM COATED ORAL at 11:48

## 2022-06-20 RX ADMIN — ACETAMINOPHEN 650 MG: 325 TABLET, FILM COATED ORAL at 07:59

## 2022-06-20 ASSESSMENT — ACTIVITIES OF DAILY LIVING (ADL)
ADLS_ACUITY_SCORE: 18

## 2022-06-20 NOTE — PROGRESS NOTES
Late entry=Verbal report received from Jennifer PERES.   IN to see patient at 1130, patient C/O RUQ pain, headache, states nothing has helped.   AM labetalol was held, BP taken and was high (see doc flowsheets) Am labetalol dose given.  EFM and TOCO applied.  FHR tachy in the 165/170's.  Dr. Herrera on  Unit, verbal update given.   Verbal order to give 250 of IV fluids for FHR tachy.     Will continue to monitor.

## 2022-06-20 NOTE — PROGRESS NOTES
"Late entry:  Report received and care assumed by this writer. Shital awake and reports interrupted sleep last night and awake \"for several hours\" due to headache \"that could be a migraine\" and is described as dull and sharp that she rates 8/0-10. Cares clustered, decreased environmental stimuli and applied cool wash cloth. Vital signs as noted. Denies visual changes or epigastric pain. DTR as noted with no clonus. Acetaminophen and flexeril given for discomfort. Discussed complaints, including unresolved headache and am blood pressure with Dr Nieves in department, then at bedside and order received to hold AM labetalol. Saline lock patent and flushed. SVE as noted by Dr Nieves and yates score calculated. Vertex by bedside ultrasound done by provider. Dr Nieves discussed plan to initiate cervical ripening later today with Shital. She is agreeable and cooperative with plan of care. NICU charge nurse informed and will have NNP see patient once again now that IOL plan is in place.   "

## 2022-06-20 NOTE — PROGRESS NOTES
"OB ANTEPARTUM PROGRESS NOTE    28yo  at 33w0d, admitted for preeclampsia with severe features   Migraines    SUBJECTIVE:  Shital is still reporting a migraine that has not improved with medication.  States taht it has been present for days. Had some improvement with dilaudid and then returned the next day.  Now states that migraine is unresponsive to flexiril and tylenol.  She has expressed strong desire to proceed with delivery over the past 2 days to Dr. Rios. Dr. Mcginnis, and Dr. Benton.  Today she again states that she feels \"very off\" and that headaches are unremitting.  She strongly desires to proceed with induction of labor.      OBJECTIVE:  BP (!) 146/88   Pulse 65   Temp 98.1  F (36.7  C) (Oral)   Resp 15   Wt 88.1 kg (194 lb 4.8 oz)   LMP 2021   SpO2 97%   BMI 31.36 kg/m       Gen: Appears uncomfortable. Washcloth on her head and seems fatigued  Abd: gravid  SVE: Closed/long/high/soft/posterior    NST: 150/mod hanna/+accel/-decel  TOCO: Acontractile     LABS:     Latest Reference Range & Units 22 23:43   Creatinine 0.60 - 1.10 mg/dL 0.63   GFR Estimate >60 mL/min/1.73m2 >90 [1]   ALT 0 - 45 U/L 10   AST 0 - 40 U/L 13   WBC 4.0 - 11.0 10e3/uL 13.1 (H)   Hemoglobin 11.7 - 15.7 g/dL 10.4 (L)   Hematocrit 35.0 - 47.0 % 31.5 (L)   Platelet Count 150 - 450 10e3/uL 172   RBC Count 3.80 - 5.20 10e6/uL 3.67 (L)   MCV 78 - 100 fL 86   MCH 26.5 - 33.0 pg 28.3   MCHC 31.5 - 36.5 g/dL 33.0   RDW 10.0 - 15.0 % 13.9   (H): Data is abnormally high  (L): Data is abnormally low  [1] Effective 2021 eGFRcr in adults is calculated using the  CKD-EPI creatinine equation which includes age and gender (Genaro alvarez al., NEJ, DOI: 10.1056/[id number])    LABS: GBS positive    MEDICATIONS:    Current Facility-Administered Medications:      acetaminophen (TYLENOL) tablet 325-650 mg, 325-650 mg, Oral, Q6H PRN, Jing Garcia MD     acetaminophen (TYLENOL) tablet 650 mg, 650 mg, Oral, Q4H PRN, " Jing Garcia MD, 650 mg at 06/20/22 1148     alum & mag hydroxide-simethicone (MAALOX) suspension 30 mL, 30 mL, Oral, Once PRN, Jing Garcia MD     betamethasone acet & sod phos (CELESTONE) injection 12 mg, 12 mg, Intramuscular, Q24H, Becki Nieves MD     [START ON 6/25/2022] betamethasone acet & sod phos (CELESTONE) injection 12 mg, 12 mg, Intramuscular, Q24H, Becki Nieves MD     cyclobenzaprine (FLEXERIL) tablet 10 mg, 10 mg, Oral, Q8H PRN, Jing Garcia MD, 10 mg at 06/20/22 0839     dinoprostone (CERVIDIL) vaginal insert 10 mg, 10 mg, Vaginal, Once, Becki Nieves MD     diphenhydrAMINE (BENADRYL) capsule 25 mg, 25 mg, Oral, Q6H PRN **OR** diphenhydrAMINE (BENADRYL) injection 25 mg, 25 mg, Intravenous, Q6H PRN, Jing Garcia MD, 25 mg at 06/16/22 1722     docusate sodium (COLACE) capsule 100 mg, 100 mg, Oral, BID PRN, Jing Garcia MD, 100 mg at 06/16/22 0107     escitalopram (LEXAPRO) tablet 30 mg, 30 mg, Oral, Daily, Jing Garcia MD, 30 mg at 06/20/22 0840     hydrALAZINE (APRESOLINE) injection 5-10 mg, 5-10 mg, Intravenous, Q20 Min PRN, Jing Garcia MD     hydrOXYzine (ATARAX) tablet 100 mg, 100 mg, Oral, TID PRN, Jing Garcia MD, 100 mg at 06/19/22 2039     labetalol (NORMODYNE) tablet 100 mg, 100 mg, Oral, Q12H, Jing Garcia MD, 100 mg at 06/20/22 1148     labetalol (NORMODYNE/TRANDATE) injection 20-40 mg, 20-40 mg, Intravenous, Q10 Min PRN, Jing Garcia MD     LORazepam (ATIVAN) injection 2 mg, 2 mg, Intravenous, Q3 Min PRN, Jing Garcia MD     magnesium sulfate 2 g in water intermittent infusion, 2 g, Intravenous, Once PRN seizures, Jing Garcia MD     magnesium sulfate 4 g in 50 mL sterile water (premade), 4 g, Intravenous, Once PRN seizures, Jing Garcia MD     magnesium sulfate injection 10 g, 10 g, Intramuscular, Once PRN, Jing Garcia MD     Medication Instructions - cervical ripening and induction  medications, , Does not apply, Continuous PRN, Becki Nieves MD     metoclopramide (REGLAN) injection 10 mg, 10 mg, Intravenous, Q6H PRN, 10 mg at 22 1722 **OR** metoclopramide (REGLAN) tablet 10 mg, 10 mg, Oral, Q6H PRN, Jing Garcia MD     No Tdap Needed - Assessment: Patient does not need Tdap vaccine, , Does not apply, Continuous PRN, Jing Garcia MD     pantoprazole (PROTONIX) EC tablet 40 mg, 40 mg, Oral, QAM AC, Jing Garcia MD, 40 mg at 22 0751     polyethylene glycol (MIRALAX) Packet 17 g, 17 g, Oral, Daily PRN, Jing Garcia MD     prenatal multivitamin w/iron per tablet 1 tablet, 1 tablet, Oral, Daily, Jing Garcia MD, 1 tablet at 22 0840     prochlorperazine (COMPAZINE) injection 10 mg, 10 mg, Intravenous, Q6H PRN **OR** prochlorperazine (COMPAZINE) tablet 10 mg, 10 mg, Oral, Q6H PRN **OR** prochlorperazine (COMPAZINE) suppository 25 mg, 25 mg, Rectal, Q12H PRN, Jing Garcia MD     senna-docusate (SENOKOT-S/PERICOLACE) 8.6-50 MG per tablet 1 tablet, 1 tablet, Oral, BID, 1 tablet at 22 0840 **OR** senna-docusate (SENOKOT-S/PERICOLACE) 8.6-50 MG per tablet 2 tablet, 2 tablet, Oral, BID, Jing Garcia MD, 2 tablet at 22 2053     simethicone (MYLICON) chewable tablet 160 mg, 160 mg, Oral, Q4H PRN, Jing Garcia MD     sodium chloride (PF) 0.9% PF flush 3 mL, 3 mL, Intracatheter, Q8H, Jing Garcia MD, 3 mL at 22 0758     terbutaline (BRETHINE) injection 0.25 mg, 0.25 mg, Subcutaneous, Once PRN, Becki Nieves MD    ASSESSMENT:  27 year [id number] at 33w0d  Preeclampsia with severe features with uncontrolled headache 2/2 migraine vs preeclampsia   WILL PROCEED WITH INDUCTION OF LABOR      PLAN:   1. Vitals: mild range Bps   2. FHT: Reassuring  - Continuous monitoring during cervical ripening   3. Preeclampsia with severe features  - Continue labetalol 100mg BID to temporize until delivery   - Labs q72h. wnl this  morning   - Plan for magnesium for 24h PP   4. IOL:   - Will start the process of cervical ripening today given unremitting headaches in the setting of preeclampsia with severe features and severe FGR  - cervidil for cervical ripening  5. Prematurity   - s/p BTMZ 5/16-5/17. Plan for repeat dose today   - s/p NICU consult   6. Severe FGR: 30w EFW <1%ile with normal dopplers   7. Migraines  - s/p Neuro consult  - s/p normal MRI   - Await neuro recommendations for medication.   8. Anxiety: continue home lexapro   9. Routine care  - up ad sapna  - General diet  - SCDs while in bed      Becki Nieves MD

## 2022-06-20 NOTE — PROGRESS NOTES
1530: Assumed care of patient who is doing well. She is henrique but states they are very mild, 1-2/10.  Coping well.    1620: Dr. Nieves called and states she would like patient started on Magnesium Sulfate, 4 gm bolus and 2 g/hr continuous dose. She states Dr. Herrera will put the order in.    1640: Patient notified of the order to start Magnesium Sulfate, she is a little nervous. All her questions answered and patient reassured.    1650: Tolerated Magnesium bolus well. Vital signs stable.    1800: Patient would like to tour NICU sometime today when she can with her .

## 2022-06-21 LAB
ABO/RH(D): NORMAL
ALT SERPL W P-5'-P-CCNC: 11 U/L (ref 0–45)
ANTIBODY SCREEN: NEGATIVE
APTT PPP: 26 SECONDS (ref 22–38)
AST SERPL W P-5'-P-CCNC: 10 U/L (ref 0–40)
CREAT SERPL-MCNC: 0.75 MG/DL (ref 0.6–1.1)
ERYTHROCYTE [DISTWIDTH] IN BLOOD BY AUTOMATED COUNT: 14.4 % (ref 10–15)
GFR SERPL CREATININE-BSD FRML MDRD: >90 ML/MIN/1.73M2
HCT VFR BLD AUTO: 32.9 % (ref 35–47)
HGB BLD-MCNC: 10.8 G/DL (ref 11.7–15.7)
HGB BLD-MCNC: 12.1 G/DL (ref 11.7–15.7)
INR PPP: 1.02 (ref 0.85–1.15)
INR PPP: 1.11 (ref 0.85–1.15)
MAGNESIUM SERPL-MCNC: 3.9 MG/DL (ref 1.8–2.6)
MAGNESIUM SERPL-MCNC: 5 MG/DL (ref 1.8–2.6)
MCH RBC QN AUTO: 28.3 PG (ref 26.5–33)
MCHC RBC AUTO-ENTMCNC: 32.8 G/DL (ref 31.5–36.5)
MCV RBC AUTO: 86 FL (ref 78–100)
PLATELET # BLD AUTO: 220 10E3/UL (ref 150–450)
RBC # BLD AUTO: 3.82 10E6/UL (ref 3.8–5.2)
SPECIMEN EXPIRATION DATE: NORMAL
WBC # BLD AUTO: 18.2 10E3/UL (ref 4–11)

## 2022-06-21 PROCEDURE — 36415 COLL VENOUS BLD VENIPUNCTURE: CPT | Performed by: OBSTETRICS & GYNECOLOGY

## 2022-06-21 PROCEDURE — 250N000013 HC RX MED GY IP 250 OP 250 PS 637: Performed by: STUDENT IN AN ORGANIZED HEALTH CARE EDUCATION/TRAINING PROGRAM

## 2022-06-21 PROCEDURE — 84450 TRANSFERASE (AST) (SGOT): CPT | Performed by: OBSTETRICS & GYNECOLOGY

## 2022-06-21 PROCEDURE — 250N000011 HC RX IP 250 OP 636: Performed by: OBSTETRICS & GYNECOLOGY

## 2022-06-21 PROCEDURE — 85027 COMPLETE CBC AUTOMATED: CPT | Performed by: OBSTETRICS & GYNECOLOGY

## 2022-06-21 PROCEDURE — 250N000013 HC RX MED GY IP 250 OP 250 PS 637: Performed by: OBSTETRICS & GYNECOLOGY

## 2022-06-21 PROCEDURE — 85610 PROTHROMBIN TIME: CPT | Performed by: OBSTETRICS & GYNECOLOGY

## 2022-06-21 PROCEDURE — 84460 ALANINE AMINO (ALT) (SGPT): CPT | Performed by: OBSTETRICS & GYNECOLOGY

## 2022-06-21 PROCEDURE — 82565 ASSAY OF CREATININE: CPT | Performed by: OBSTETRICS & GYNECOLOGY

## 2022-06-21 PROCEDURE — 85730 THROMBOPLASTIN TIME PARTIAL: CPT | Performed by: OBSTETRICS & GYNECOLOGY

## 2022-06-21 PROCEDURE — 83735 ASSAY OF MAGNESIUM: CPT | Performed by: OBSTETRICS & GYNECOLOGY

## 2022-06-21 PROCEDURE — 120N000001 HC R&B MED SURG/OB

## 2022-06-21 PROCEDURE — 258N000003 HC RX IP 258 OP 636: Performed by: OBSTETRICS & GYNECOLOGY

## 2022-06-21 PROCEDURE — 250N000011 HC RX IP 250 OP 636: Performed by: STUDENT IN AN ORGANIZED HEALTH CARE EDUCATION/TRAINING PROGRAM

## 2022-06-21 PROCEDURE — 250N000009 HC RX 250: Performed by: OBSTETRICS & GYNECOLOGY

## 2022-06-21 PROCEDURE — 86901 BLOOD TYPING SEROLOGIC RH(D): CPT | Performed by: OBSTETRICS & GYNECOLOGY

## 2022-06-21 PROCEDURE — 86850 RBC ANTIBODY SCREEN: CPT | Performed by: OBSTETRICS & GYNECOLOGY

## 2022-06-21 PROCEDURE — 85018 HEMOGLOBIN: CPT | Performed by: OBSTETRICS & GYNECOLOGY

## 2022-06-21 RX ORDER — EPHEDRINE SULFATE 50 MG/ML
5 INJECTION, SOLUTION INTRAMUSCULAR; INTRAVENOUS; SUBCUTANEOUS
Status: COMPLETED | OUTPATIENT
Start: 2022-06-21 | End: 2022-06-21

## 2022-06-21 RX ORDER — NALOXONE HYDROCHLORIDE 1 MG/ML
INJECTION INTRAMUSCULAR; INTRAVENOUS; SUBCUTANEOUS
Status: DISCONTINUED
Start: 2022-06-21 | End: 2022-06-22 | Stop reason: WASHOUT

## 2022-06-21 RX ORDER — MISOPROSTOL 100 UG/1
25 TABLET ORAL EVERY 4 HOURS PRN
Status: DISCONTINUED | OUTPATIENT
Start: 2022-06-21 | End: 2022-06-22

## 2022-06-21 RX ORDER — EPHEDRINE SULFATE 50 MG/ML
5 INJECTION, SOLUTION INTRAMUSCULAR; INTRAVENOUS; SUBCUTANEOUS ONCE
Status: COMPLETED | OUTPATIENT
Start: 2022-06-21 | End: 2022-06-21

## 2022-06-21 RX ORDER — NALBUPHINE HYDROCHLORIDE 10 MG/ML
10 INJECTION, SOLUTION INTRAMUSCULAR; INTRAVENOUS; SUBCUTANEOUS ONCE
Status: COMPLETED | OUTPATIENT
Start: 2022-06-21 | End: 2022-06-21

## 2022-06-21 RX ORDER — MORPHINE SULFATE 10 MG/ML
15 INJECTION, SOLUTION INTRAMUSCULAR; INTRAVENOUS ONCE
Status: COMPLETED | OUTPATIENT
Start: 2022-06-21 | End: 2022-06-21

## 2022-06-21 RX ORDER — TERBUTALINE SULFATE 1 MG/ML
0.25 INJECTION, SOLUTION SUBCUTANEOUS
Status: COMPLETED | OUTPATIENT
Start: 2022-06-21 | End: 2022-06-21

## 2022-06-21 RX ORDER — MISOPROSTOL 100 UG/1
25 TABLET ORAL
Status: DISCONTINUED | OUTPATIENT
Start: 2022-06-21 | End: 2022-06-21

## 2022-06-21 RX ORDER — EPHEDRINE SULFATE 50 MG/ML
INJECTION, SOLUTION INTRAMUSCULAR; INTRAVENOUS; SUBCUTANEOUS
Status: DISPENSED
Start: 2022-06-21 | End: 2022-06-22

## 2022-06-21 RX ADMIN — TERBUTALINE SULFATE 0.25 MG: 1 INJECTION SUBCUTANEOUS at 20:53

## 2022-06-21 RX ADMIN — NALBUPHINE HYDROCHLORIDE 10 MG: 10 INJECTION, SOLUTION INTRAMUSCULAR; INTRAVENOUS; SUBCUTANEOUS at 10:04

## 2022-06-21 RX ADMIN — MISOPROSTOL 25 MCG: 100 TABLET ORAL at 13:55

## 2022-06-21 RX ADMIN — SODIUM CHLORIDE, POTASSIUM CHLORIDE, SODIUM LACTATE AND CALCIUM CHLORIDE 25 ML/HR: 600; 310; 30; 20 INJECTION, SOLUTION INTRAVENOUS at 14:57

## 2022-06-21 RX ADMIN — LABETALOL HYDROCHLORIDE 100 MG: 100 TABLET, FILM COATED ORAL at 09:45

## 2022-06-21 RX ADMIN — MISOPROSTOL 25 MCG: 100 TABLET ORAL at 09:36

## 2022-06-21 RX ADMIN — Medication 5 MG: at 21:04

## 2022-06-21 RX ADMIN — METOCLOPRAMIDE HYDROCHLORIDE 10 MG: 5 INJECTION INTRAMUSCULAR; INTRAVENOUS at 18:40

## 2022-06-21 RX ADMIN — CYCLOBENZAPRINE 10 MG: 10 TABLET, FILM COATED ORAL at 09:15

## 2022-06-21 RX ADMIN — MISOPROSTOL 25 MCG: 100 TABLET ORAL at 07:33

## 2022-06-21 RX ADMIN — ACETAMINOPHEN 650 MG: 325 TABLET, FILM COATED ORAL at 09:14

## 2022-06-21 RX ADMIN — MORPHINE SULFATE 15 MG: 10 INJECTION INTRAVENOUS at 18:32

## 2022-06-21 RX ADMIN — Medication 5 MG: at 21:35

## 2022-06-21 RX ADMIN — BETAMETHASONE ACETATE AND BETAMETHASONE SODIUM PHOSPHATE 12 MG: 3; 3 INJECTION, SUSPENSION INTRA-ARTICULAR; INTRALESIONAL; INTRAMUSCULAR; SOFT TISSUE at 13:49

## 2022-06-21 RX ADMIN — PANTOPRAZOLE SODIUM 40 MG: 40 TABLET, DELAYED RELEASE ORAL at 07:33

## 2022-06-21 RX ADMIN — HYDROXYZINE HYDROCHLORIDE 100 MG: 50 TABLET, FILM COATED ORAL at 18:33

## 2022-06-21 RX ADMIN — MISOPROSTOL 25 MCG: 100 TABLET ORAL at 05:31

## 2022-06-21 RX ADMIN — MISOPROSTOL 25 MCG: 100 TABLET ORAL at 17:53

## 2022-06-21 RX ADMIN — ESCITALOPRAM OXALATE 30 MG: 20 TABLET ORAL at 09:14

## 2022-06-21 RX ADMIN — MAGNESIUM SULFATE IN WATER 1 G/HR: 40 INJECTION, SOLUTION INTRAVENOUS at 18:19

## 2022-06-21 RX ADMIN — ACETAMINOPHEN 650 MG: 325 TABLET, FILM COATED ORAL at 02:33

## 2022-06-21 RX ADMIN — SODIUM CHLORIDE, POTASSIUM CHLORIDE, SODIUM LACTATE AND CALCIUM CHLORIDE 125 ML/HR: 600; 310; 30; 20 INJECTION, SOLUTION INTRAVENOUS at 21:42

## 2022-06-21 RX ADMIN — HYDROXYZINE HYDROCHLORIDE 100 MG: 50 TABLET, FILM COATED ORAL at 02:33

## 2022-06-21 RX ADMIN — METOCLOPRAMIDE HYDROCHLORIDE 10 MG: 5 INJECTION INTRAMUSCULAR; INTRAVENOUS at 11:37

## 2022-06-21 ASSESSMENT — ACTIVITIES OF DAILY LIVING (ADL)
ADLS_ACUITY_SCORE: 18

## 2022-06-21 NOTE — PROGRESS NOTES
Call placed to Dr Herrera  She is aware of pts blood pressures.  Plan to continue with the induction and monitor blood pressures.

## 2022-06-21 NOTE — PROGRESS NOTES
States she has a terrible headache, visibly miserable, covering head, tense, c/o nausea as well. Requesting antiemetic and meds for sleep now. Morphine and atarax given with reglan . Ice pack also. Encouraged rest and darkened room without screens on.

## 2022-06-21 NOTE — PROGRESS NOTES
Pt has a headache and states she has not slept.  She is requesting to use tazanidine and percocet.  RN was told that MD does not want pt using more percocet.  Pt agreed to this and is ok with a dose of flexeril.

## 2022-06-21 NOTE — PROGRESS NOTES
Pt was given Nubain for headache.  She was hyperventilating and sobbing and very anxious.  He mother is now at the bedside and is comforting pt and a calming presence.  Pt is starting to relax after the dose was given.  Sats 100% on room air.  Pt was reassured by RN and her mother that everything was going well. Pt asking lots of questions.  Questions were answered and pt was encouraged to rest.

## 2022-06-21 NOTE — PLAN OF CARE
Pt is up to bathroom voiding.  Blood pressures are elevated and MD is aware.  Afebrile.  Pt has periods of anxiety.  Her support people are helpful to help alleviate her anxiety.  Questions encouraged and answered.  Pt is using pain medications here as prescribed with adequate relief. Pt is changing positions frequently. Nausea medications given with relief.

## 2022-06-21 NOTE — PROGRESS NOTES
Shital had felt a bit better, now headache as bad as before, nausea returned, teary. Requested tylenol and atarax, more ice for head.

## 2022-06-21 NOTE — PROGRESS NOTES
No cervical change from cervidil, next plan is cytotec. First dose given and pt vomited within 2 minutes. Will wait for any more PO meds at this time, discouraged any oral intake for 2 hours to let stomach rest.

## 2022-06-21 NOTE — PLAN OF CARE
Problem: Pain Acute  Goal: Acceptable Pain Control and Functional Ability  Outcome: Ongoing, Progressing   Shital will obtain relief from severe headaches.  Shital's headache pain has worsened this shift. New factors possibly contributing to this are Magnesium, betamethasone and cervical ripening agents. Her nausea has also increased and vomiting has occurred in spite of antiemetics. She has been restless and crying, her  is laying with her and rubbing her head/neck. Ice pack refreshed for head.

## 2022-06-22 ENCOUNTER — ANCILLARY PROCEDURE (OUTPATIENT)
Dept: ULTRASOUND IMAGING | Facility: HOSPITAL | Age: 27
End: 2022-06-22
Attending: ANESTHESIOLOGY
Payer: MEDICAID

## 2022-06-22 ENCOUNTER — ANESTHESIA EVENT (OUTPATIENT)
Dept: OBGYN | Facility: HOSPITAL | Age: 27
End: 2022-06-22
Payer: MEDICAID

## 2022-06-22 ENCOUNTER — ANESTHESIA (OUTPATIENT)
Dept: OBGYN | Facility: HOSPITAL | Age: 27
End: 2022-06-22
Payer: MEDICAID

## 2022-06-22 LAB
APTT PPP: 27 SECONDS (ref 22–38)
INR PPP: 1.07 (ref 0.85–1.15)
PLATELET # BLD AUTO: 221 10E3/UL (ref 150–450)

## 2022-06-22 PROCEDURE — 85610 PROTHROMBIN TIME: CPT | Performed by: ANESTHESIOLOGY

## 2022-06-22 PROCEDURE — 999N000249 HC STATISTIC C-SECTION ON UNIT

## 2022-06-22 PROCEDURE — 88305 TISSUE EXAM BY PATHOLOGIST: CPT | Mod: TC | Performed by: OBSTETRICS & GYNECOLOGY

## 2022-06-22 PROCEDURE — 250N000013 HC RX MED GY IP 250 OP 250 PS 637: Performed by: OBSTETRICS & GYNECOLOGY

## 2022-06-22 PROCEDURE — 250N000011 HC RX IP 250 OP 636: Performed by: OBSTETRICS & GYNECOLOGY

## 2022-06-22 PROCEDURE — 258N000003 HC RX IP 258 OP 636: Performed by: NURSE ANESTHETIST, CERTIFIED REGISTERED

## 2022-06-22 PROCEDURE — 250N000013 HC RX MED GY IP 250 OP 250 PS 637: Performed by: STUDENT IN AN ORGANIZED HEALTH CARE EDUCATION/TRAINING PROGRAM

## 2022-06-22 PROCEDURE — 370N000017 HC ANESTHESIA TECHNICAL FEE, PER MIN: Performed by: OBSTETRICS & GYNECOLOGY

## 2022-06-22 PROCEDURE — 272N000001 HC OR GENERAL SUPPLY STERILE: Performed by: OBSTETRICS & GYNECOLOGY

## 2022-06-22 PROCEDURE — 360N000076 HC SURGERY LEVEL 3, PER MIN: Performed by: OBSTETRICS & GYNECOLOGY

## 2022-06-22 PROCEDURE — 36415 COLL VENOUS BLD VENIPUNCTURE: CPT | Performed by: ANESTHESIOLOGY

## 2022-06-22 PROCEDURE — 120N000001 HC R&B MED SURG/OB

## 2022-06-22 PROCEDURE — 85049 AUTOMATED PLATELET COUNT: CPT | Performed by: ANESTHESIOLOGY

## 2022-06-22 PROCEDURE — 250N000011 HC RX IP 250 OP 636

## 2022-06-22 PROCEDURE — 250N000011 HC RX IP 250 OP 636: Performed by: NURSE ANESTHETIST, CERTIFIED REGISTERED

## 2022-06-22 PROCEDURE — 85730 THROMBOPLASTIN TIME PARTIAL: CPT | Performed by: ANESTHESIOLOGY

## 2022-06-22 PROCEDURE — 250N000009 HC RX 250: Performed by: NURSE ANESTHETIST, CERTIFIED REGISTERED

## 2022-06-22 PROCEDURE — 258N000003 HC RX IP 258 OP 636: Performed by: OBSTETRICS & GYNECOLOGY

## 2022-06-22 PROCEDURE — 250N000011 HC RX IP 250 OP 636: Performed by: ANESTHESIOLOGY

## 2022-06-22 PROCEDURE — C9290 INJ, BUPIVACAINE LIPOSOME: HCPCS | Performed by: ANESTHESIOLOGY

## 2022-06-22 RX ORDER — CARBOPROST TROMETHAMINE 250 UG/ML
250 INJECTION, SOLUTION INTRAMUSCULAR
Status: DISCONTINUED | OUTPATIENT
Start: 2022-06-22 | End: 2022-06-24 | Stop reason: HOSPADM

## 2022-06-22 RX ORDER — OXYTOCIN/0.9 % SODIUM CHLORIDE 30/500 ML
100-340 PLASTIC BAG, INJECTION (ML) INTRAVENOUS CONTINUOUS PRN
Status: DISCONTINUED | OUTPATIENT
Start: 2022-06-22 | End: 2022-06-24 | Stop reason: HOSPADM

## 2022-06-22 RX ORDER — OXYTOCIN 10 [USP'U]/ML
10 INJECTION, SOLUTION INTRAMUSCULAR; INTRAVENOUS
Status: DISCONTINUED | OUTPATIENT
Start: 2022-06-22 | End: 2022-06-24 | Stop reason: HOSPADM

## 2022-06-22 RX ORDER — CARBOPROST TROMETHAMINE 250 UG/ML
250 INJECTION, SOLUTION INTRAMUSCULAR
Status: DISCONTINUED | OUTPATIENT
Start: 2022-06-22 | End: 2022-06-22 | Stop reason: HOSPADM

## 2022-06-22 RX ORDER — LIDOCAINE 40 MG/G
CREAM TOPICAL
Status: DISCONTINUED | OUTPATIENT
Start: 2022-06-22 | End: 2022-06-22 | Stop reason: HOSPADM

## 2022-06-22 RX ORDER — MORPHINE SULFATE 1 MG/ML
INJECTION, SOLUTION EPIDURAL; INTRATHECAL; INTRAVENOUS
Status: COMPLETED | OUTPATIENT
Start: 2022-06-22 | End: 2022-06-22

## 2022-06-22 RX ORDER — MORPHINE SULFATE 0.5 MG/ML
150 INJECTION, SOLUTION EPIDURAL; INTRATHECAL; INTRAVENOUS ONCE
Status: DISCONTINUED | OUTPATIENT
Start: 2022-06-22 | End: 2022-06-22 | Stop reason: HOSPADM

## 2022-06-22 RX ORDER — ONDANSETRON 2 MG/ML
4 INJECTION INTRAMUSCULAR; INTRAVENOUS EVERY 6 HOURS PRN
Status: DISCONTINUED | OUTPATIENT
Start: 2022-06-22 | End: 2022-06-24 | Stop reason: HOSPADM

## 2022-06-22 RX ORDER — PROCHLORPERAZINE MALEATE 10 MG
10 TABLET ORAL EVERY 6 HOURS PRN
Status: DISCONTINUED | OUTPATIENT
Start: 2022-06-22 | End: 2022-06-24 | Stop reason: HOSPADM

## 2022-06-22 RX ORDER — SODIUM CHLORIDE, SODIUM LACTATE, POTASSIUM CHLORIDE, CALCIUM CHLORIDE 600; 310; 30; 20 MG/100ML; MG/100ML; MG/100ML; MG/100ML
INJECTION, SOLUTION INTRAVENOUS CONTINUOUS
Status: DISCONTINUED | OUTPATIENT
Start: 2022-06-22 | End: 2022-06-24 | Stop reason: HOSPADM

## 2022-06-22 RX ORDER — AMOXICILLIN 250 MG
2 CAPSULE ORAL 2 TIMES DAILY
Status: DISCONTINUED | OUTPATIENT
Start: 2022-06-22 | End: 2022-06-24 | Stop reason: HOSPADM

## 2022-06-22 RX ORDER — MAGNESIUM SULFATE IN WATER 40 MG/ML
2 INJECTION, SOLUTION INTRAVENOUS CONTINUOUS
Status: DISCONTINUED | OUTPATIENT
Start: 2022-06-22 | End: 2022-06-23

## 2022-06-22 RX ORDER — MODIFIED LANOLIN
OINTMENT (GRAM) TOPICAL
Status: DISCONTINUED | OUTPATIENT
Start: 2022-06-22 | End: 2022-06-24 | Stop reason: HOSPADM

## 2022-06-22 RX ORDER — FENTANYL CITRATE 50 UG/ML
25 INJECTION, SOLUTION INTRAMUSCULAR; INTRAVENOUS EVERY 5 MIN PRN
Status: DISCONTINUED | OUTPATIENT
Start: 2022-06-22 | End: 2022-06-24 | Stop reason: HOSPADM

## 2022-06-22 RX ORDER — OXYTOCIN/0.9 % SODIUM CHLORIDE 30/500 ML
340 PLASTIC BAG, INJECTION (ML) INTRAVENOUS CONTINUOUS PRN
Status: DISCONTINUED | OUTPATIENT
Start: 2022-06-22 | End: 2022-06-24 | Stop reason: HOSPADM

## 2022-06-22 RX ORDER — SODIUM CHLORIDE, SODIUM LACTATE, POTASSIUM CHLORIDE, CALCIUM CHLORIDE 600; 310; 30; 20 MG/100ML; MG/100ML; MG/100ML; MG/100ML
INJECTION, SOLUTION INTRAVENOUS CONTINUOUS
Status: DISCONTINUED | OUTPATIENT
Start: 2022-06-22 | End: 2022-06-22 | Stop reason: HOSPADM

## 2022-06-22 RX ORDER — CEFAZOLIN SODIUM/WATER 2 G/20 ML
2 SYRINGE (ML) INTRAVENOUS SEE ADMIN INSTRUCTIONS
Status: DISCONTINUED | OUTPATIENT
Start: 2022-06-22 | End: 2022-06-22 | Stop reason: HOSPADM

## 2022-06-22 RX ORDER — ONDANSETRON 2 MG/ML
4 INJECTION INTRAMUSCULAR; INTRAVENOUS EVERY 30 MIN PRN
Status: DISCONTINUED | OUTPATIENT
Start: 2022-06-22 | End: 2022-06-24 | Stop reason: HOSPADM

## 2022-06-22 RX ORDER — FENTANYL CITRATE 50 UG/ML
50 INJECTION, SOLUTION INTRAMUSCULAR; INTRAVENOUS
Status: DISCONTINUED | OUTPATIENT
Start: 2022-06-22 | End: 2022-06-22 | Stop reason: HOSPADM

## 2022-06-22 RX ORDER — METHYLERGONOVINE MALEATE 0.2 MG/ML
200 INJECTION INTRAVENOUS
Status: DISCONTINUED | OUTPATIENT
Start: 2022-06-22 | End: 2022-06-24 | Stop reason: HOSPADM

## 2022-06-22 RX ORDER — ONDANSETRON 4 MG/1
4 TABLET, ORALLY DISINTEGRATING ORAL EVERY 30 MIN PRN
Status: DISCONTINUED | OUTPATIENT
Start: 2022-06-22 | End: 2022-06-24 | Stop reason: HOSPADM

## 2022-06-22 RX ORDER — OXYCODONE HYDROCHLORIDE 5 MG/1
5 TABLET ORAL EVERY 4 HOURS PRN
Status: DISCONTINUED | OUTPATIENT
Start: 2022-06-22 | End: 2022-06-24 | Stop reason: HOSPADM

## 2022-06-22 RX ORDER — MAGNESIUM SULFATE 4 G/50ML
4 INJECTION INTRAVENOUS ONCE
Status: COMPLETED | OUTPATIENT
Start: 2022-06-22 | End: 2022-06-22

## 2022-06-22 RX ORDER — ACETAMINOPHEN 325 MG/1
975 TABLET ORAL EVERY 6 HOURS
Status: DISCONTINUED | OUTPATIENT
Start: 2022-06-22 | End: 2022-06-24 | Stop reason: HOSPADM

## 2022-06-22 RX ORDER — MISOPROSTOL 200 UG/1
800 TABLET ORAL
Status: DISCONTINUED | OUTPATIENT
Start: 2022-06-22 | End: 2022-06-24 | Stop reason: HOSPADM

## 2022-06-22 RX ORDER — CALCIUM GLUCONATE 94 MG/ML
1 INJECTION, SOLUTION INTRAVENOUS
Status: DISCONTINUED | OUTPATIENT
Start: 2022-06-22 | End: 2022-06-24 | Stop reason: HOSPADM

## 2022-06-22 RX ORDER — METHYLERGONOVINE MALEATE 0.2 MG/ML
200 INJECTION INTRAVENOUS
Status: DISCONTINUED | OUTPATIENT
Start: 2022-06-22 | End: 2022-06-22 | Stop reason: HOSPADM

## 2022-06-22 RX ORDER — IBUPROFEN 800 MG/1
800 TABLET, FILM COATED ORAL EVERY 6 HOURS
Status: DISCONTINUED | OUTPATIENT
Start: 2022-06-23 | End: 2022-06-24 | Stop reason: HOSPADM

## 2022-06-22 RX ORDER — MISOPROSTOL 200 UG/1
800 TABLET ORAL
Status: DISCONTINUED | OUTPATIENT
Start: 2022-06-22 | End: 2022-06-22 | Stop reason: HOSPADM

## 2022-06-22 RX ORDER — BUPIVACAINE HYDROCHLORIDE 2.5 MG/ML
INJECTION, SOLUTION EPIDURAL; INFILTRATION; INTRACAUDAL
Status: DISCONTINUED | OUTPATIENT
Start: 2022-06-22 | End: 2022-06-22

## 2022-06-22 RX ORDER — OXYTOCIN 10 [USP'U]/ML
10 INJECTION, SOLUTION INTRAMUSCULAR; INTRAVENOUS
Status: DISCONTINUED | OUTPATIENT
Start: 2022-06-22 | End: 2022-06-22 | Stop reason: HOSPADM

## 2022-06-22 RX ORDER — SIMETHICONE 80 MG
80 TABLET,CHEWABLE ORAL 4 TIMES DAILY PRN
Status: DISCONTINUED | OUTPATIENT
Start: 2022-06-22 | End: 2022-06-24 | Stop reason: HOSPADM

## 2022-06-22 RX ORDER — CITRIC ACID/SODIUM CITRATE 334-500MG
30 SOLUTION, ORAL ORAL
Status: COMPLETED | OUTPATIENT
Start: 2022-06-22 | End: 2022-06-22

## 2022-06-22 RX ORDER — MISOPROSTOL 200 UG/1
400 TABLET ORAL
Status: DISCONTINUED | OUTPATIENT
Start: 2022-06-22 | End: 2022-06-22 | Stop reason: HOSPADM

## 2022-06-22 RX ORDER — AMOXICILLIN 250 MG
1 CAPSULE ORAL 2 TIMES DAILY
Status: DISCONTINUED | OUTPATIENT
Start: 2022-06-22 | End: 2022-06-24 | Stop reason: HOSPADM

## 2022-06-22 RX ORDER — DEXTROSE, SODIUM CHLORIDE, SODIUM LACTATE, POTASSIUM CHLORIDE, AND CALCIUM CHLORIDE 5; .6; .31; .03; .02 G/100ML; G/100ML; G/100ML; G/100ML; G/100ML
INJECTION, SOLUTION INTRAVENOUS CONTINUOUS
Status: DISCONTINUED | OUTPATIENT
Start: 2022-06-22 | End: 2022-06-24 | Stop reason: HOSPADM

## 2022-06-22 RX ORDER — BUPIVACAINE HYDROCHLORIDE 7.5 MG/ML
INJECTION, SOLUTION INTRASPINAL
Status: COMPLETED | OUTPATIENT
Start: 2022-06-22 | End: 2022-06-22

## 2022-06-22 RX ORDER — HYDROCORTISONE 25 MG/G
CREAM TOPICAL 3 TIMES DAILY PRN
Status: DISCONTINUED | OUTPATIENT
Start: 2022-06-22 | End: 2022-06-24 | Stop reason: HOSPADM

## 2022-06-22 RX ORDER — BISACODYL 10 MG
10 SUPPOSITORY, RECTAL RECTAL DAILY PRN
Status: DISCONTINUED | OUTPATIENT
Start: 2022-06-24 | End: 2022-06-24 | Stop reason: HOSPADM

## 2022-06-22 RX ORDER — ONDANSETRON 4 MG/1
4 TABLET, ORALLY DISINTEGRATING ORAL EVERY 6 HOURS PRN
Status: DISCONTINUED | OUTPATIENT
Start: 2022-06-22 | End: 2022-06-24 | Stop reason: HOSPADM

## 2022-06-22 RX ORDER — HYDROMORPHONE HCL IN WATER/PF 6 MG/30 ML
0.2 PATIENT CONTROLLED ANALGESIA SYRINGE INTRAVENOUS EVERY 5 MIN PRN
Status: DISCONTINUED | OUTPATIENT
Start: 2022-06-22 | End: 2022-06-24 | Stop reason: HOSPADM

## 2022-06-22 RX ORDER — KETOROLAC TROMETHAMINE 30 MG/ML
30 INJECTION, SOLUTION INTRAMUSCULAR; INTRAVENOUS EVERY 6 HOURS
Status: COMPLETED | OUTPATIENT
Start: 2022-06-22 | End: 2022-06-23

## 2022-06-22 RX ORDER — MISOPROSTOL 200 UG/1
400 TABLET ORAL
Status: DISCONTINUED | OUTPATIENT
Start: 2022-06-22 | End: 2022-06-24 | Stop reason: HOSPADM

## 2022-06-22 RX ORDER — OXYTOCIN/0.9 % SODIUM CHLORIDE 30/500 ML
340 PLASTIC BAG, INJECTION (ML) INTRAVENOUS CONTINUOUS PRN
Status: DISCONTINUED | OUTPATIENT
Start: 2022-06-22 | End: 2022-06-22 | Stop reason: HOSPADM

## 2022-06-22 RX ORDER — PROCHLORPERAZINE 25 MG
25 SUPPOSITORY, RECTAL RECTAL EVERY 12 HOURS PRN
Status: DISCONTINUED | OUTPATIENT
Start: 2022-06-22 | End: 2022-06-24 | Stop reason: HOSPADM

## 2022-06-22 RX ORDER — CEFAZOLIN SODIUM/WATER 2 G/20 ML
2 SYRINGE (ML) INTRAVENOUS
Status: COMPLETED | OUTPATIENT
Start: 2022-06-22 | End: 2022-06-22

## 2022-06-22 RX ORDER — KETOROLAC TROMETHAMINE 30 MG/ML
INJECTION, SOLUTION INTRAMUSCULAR; INTRAVENOUS PRN
Status: DISCONTINUED | OUTPATIENT
Start: 2022-06-22 | End: 2022-06-22

## 2022-06-22 RX ORDER — METOCLOPRAMIDE 10 MG/1
10 TABLET ORAL EVERY 6 HOURS PRN
Status: DISCONTINUED | OUTPATIENT
Start: 2022-06-22 | End: 2022-06-24 | Stop reason: HOSPADM

## 2022-06-22 RX ORDER — OXYTOCIN/0.9 % SODIUM CHLORIDE 30/500 ML
PLASTIC BAG, INJECTION (ML) INTRAVENOUS CONTINUOUS PRN
Status: DISCONTINUED | OUTPATIENT
Start: 2022-06-22 | End: 2022-06-22

## 2022-06-22 RX ORDER — LIDOCAINE 40 MG/G
CREAM TOPICAL
Status: DISCONTINUED | OUTPATIENT
Start: 2022-06-22 | End: 2022-06-24 | Stop reason: HOSPADM

## 2022-06-22 RX ORDER — METOCLOPRAMIDE HYDROCHLORIDE 5 MG/ML
10 INJECTION INTRAMUSCULAR; INTRAVENOUS EVERY 6 HOURS PRN
Status: DISCONTINUED | OUTPATIENT
Start: 2022-06-22 | End: 2022-06-24 | Stop reason: HOSPADM

## 2022-06-22 RX ORDER — SIMETHICONE 80 MG
80 TABLET,CHEWABLE ORAL EVERY 6 HOURS
Status: DISPENSED | OUTPATIENT
Start: 2022-06-22 | End: 2022-06-23

## 2022-06-22 RX ORDER — ACETAMINOPHEN 325 MG/1
975 TABLET ORAL ONCE
Status: COMPLETED | OUTPATIENT
Start: 2022-06-22 | End: 2022-06-22

## 2022-06-22 RX ADMIN — MAGNESIUM SULFATE HEPTAHYDRATE 4 G: 80 INJECTION, SOLUTION INTRAVENOUS at 11:32

## 2022-06-22 RX ADMIN — ACETAMINOPHEN 975 MG: 325 TABLET, FILM COATED ORAL at 15:07

## 2022-06-22 RX ADMIN — SODIUM CHLORIDE, POTASSIUM CHLORIDE, SODIUM LACTATE AND CALCIUM CHLORIDE 50 ML/HR: 600; 310; 30; 20 INJECTION, SOLUTION INTRAVENOUS at 07:13

## 2022-06-22 RX ADMIN — KETOROLAC TROMETHAMINE 15 MG: 30 INJECTION, SOLUTION INTRAMUSCULAR at 10:38

## 2022-06-22 RX ADMIN — KETOROLAC TROMETHAMINE 30 MG: 30 INJECTION, SOLUTION INTRAMUSCULAR at 22:55

## 2022-06-22 RX ADMIN — SENNOSIDES AND DOCUSATE SODIUM 1 TABLET: 8.6; 5 TABLET ORAL at 20:46

## 2022-06-22 RX ADMIN — BUPIVACAINE HYDROCHLORIDE 20 ML: 2.5 INJECTION, SOLUTION EPIDURAL; INFILTRATION; INTRACAUDAL at 10:55

## 2022-06-22 RX ADMIN — PRENATAL VIT W/ FE FUMARATE-FA TAB 27-0.8 MG 1 TABLET: 27-0.8 TAB at 11:53

## 2022-06-22 RX ADMIN — METOCLOPRAMIDE HYDROCHLORIDE 10 MG: 5 INJECTION INTRAMUSCULAR; INTRAVENOUS at 15:59

## 2022-06-22 RX ADMIN — MAGNESIUM SULFATE IN WATER 2 G/HR: 40 INJECTION, SOLUTION INTRAVENOUS at 12:09

## 2022-06-22 RX ADMIN — ESCITALOPRAM OXALATE 30 MG: 20 TABLET ORAL at 11:52

## 2022-06-22 RX ADMIN — LABETALOL HYDROCHLORIDE 100 MG: 100 TABLET, FILM COATED ORAL at 11:54

## 2022-06-22 RX ADMIN — SODIUM CITRATE AND CITRIC ACID MONOHYDRATE 30 ML: 500; 334 SOLUTION ORAL at 09:21

## 2022-06-22 RX ADMIN — KETOROLAC TROMETHAMINE 30 MG: 30 INJECTION, SOLUTION INTRAMUSCULAR at 16:56

## 2022-06-22 RX ADMIN — SODIUM CHLORIDE, POTASSIUM CHLORIDE, SODIUM LACTATE AND CALCIUM CHLORIDE 75 ML/HR: 600; 310; 30; 20 INJECTION, SOLUTION INTRAVENOUS at 17:37

## 2022-06-22 RX ADMIN — BUPIVACAINE HYDROCHLORIDE IN DEXTROSE 1.6 ML: 7.5 INJECTION, SOLUTION SUBARACHNOID at 09:50

## 2022-06-22 RX ADMIN — DOCUSATE SODIUM 100 MG: 100 CAPSULE, LIQUID FILLED ORAL at 11:54

## 2022-06-22 RX ADMIN — BUPIVACAINE 20 ML: 13.3 INJECTION, SUSPENSION, LIPOSOMAL INFILTRATION at 10:55

## 2022-06-22 RX ADMIN — SIMETHICONE 80 MG: 80 TABLET, CHEWABLE ORAL at 22:56

## 2022-06-22 RX ADMIN — Medication 0.15 MG: at 09:50

## 2022-06-22 RX ADMIN — ACETAMINOPHEN 975 MG: 325 TABLET, FILM COATED ORAL at 20:47

## 2022-06-22 RX ADMIN — ACETAMINOPHEN 975 MG: 325 TABLET, FILM COATED ORAL at 08:23

## 2022-06-22 RX ADMIN — Medication 300 ML/HR: at 10:10

## 2022-06-22 RX ADMIN — PHENYLEPHRINE HYDROCHLORIDE 0.3 MCG/KG/MIN: 10 INJECTION INTRAVENOUS at 09:52

## 2022-06-22 RX ADMIN — Medication 2 G: at 09:42

## 2022-06-22 RX ADMIN — SENNOSIDES AND DOCUSATE SODIUM 1 TABLET: 8.6; 5 TABLET ORAL at 11:54

## 2022-06-22 ASSESSMENT — ACTIVITIES OF DAILY LIVING (ADL)
ADLS_ACUITY_SCORE: 18

## 2022-06-22 NOTE — PROGRESS NOTES
See MD note and documentation on [facility] monitor strip.

## 2022-06-22 NOTE — LACTATION NOTE
Lactation to patient room to discuss stimulating milk production for premature baby. Patient states that bedside RN has instructed her on hand expression, and she has been set up to pump with hospital pump. Mom has questions about medications she has been taking in pregnancy and if they are safe for breastfeeding. This lactation consultant will look up medications in [facility]'s [facility], print off and return tomorrow with information and follow up.

## 2022-06-22 NOTE — PLAN OF CARE
VSS. FFU/1, scant rubra lochia, few small clots expressed x1. Incision dressing is marked for scant amount of serosanguinous drainage. Pt endorses minimal cramping pain at rest. Do out at 1326, no void yet. Ambulated to BR with SBA x2, steady on her feet. Mag infusing per orders postpartum.  Lower DTR's +2, no clonus bilaterally.  1 pumping session with 10mL output, taken to baby. RN transported pt in wheelchair to NICU.    Problem: Plan of Care - These are the overarching goals to be used throughout the patient stay.    Goal: Plan of Care Review/Shift Note  Outcome: Ongoing, Progressing  Flowsheets (Taken 2022 1307)  Plan of Care Reviewed With:    patient    spouse  Overall Patient Progress: improving     Problem: Bleeding (Postpartum  Delivery)  Goal: Hemostasis  Outcome: Ongoing, Progressing     Problem: Infection (Postpartum  Delivery)  Goal: Absence of Infection Signs and Symptoms  Outcome: Ongoing, Progressing     Problem: Pain (Postpartum  Delivery)  Goal: Acceptable Pain Control  Outcome: Ongoing, Progressing  Intervention: Prevent or Manage Pain  Recent Flowsheet Documentation  Taken 2022 0820 by Myrna Bhatia, RN  Pain Management Interventions: medication (see MAR)     Problem: Postoperative Urinary Retention (Postpartum  Delivery)  Goal: Effective Urinary Elimination  Outcome: Ongoing, Progressing     Problem: Adjustment to Role Transition (Postpartum  Delivery)  Goal: Successful Maternal Role Transition  Outcome: Ongoing, Progressing

## 2022-06-22 NOTE — ANESTHESIA PROCEDURE NOTES
TAP Procedure Note    Pre-Procedure   Staff -        Anesthesiologist:  Toro Aceves MD       Performed By: anesthesiologist       Location: OR       Procedure Start/Stop Times: 6/22/2022 10:55 AM and 6/22/2022 11:00 AM       Pre-Anesthestic Checklist: patient identified, IV checked, site marked, risks and benefits discussed, informed consent, monitors and equipment checked, pre-op evaluation, at physician/surgeon's request and post-op pain management  Timeout:       Correct Patient: Yes        Correct Procedure: Yes        Correct Site: Yes        Correct Position: Yes        Correct Laterality: Yes        Site Marked: Yes  Procedure Documentation  Procedure: TAP       Laterality: bilateral       Patient Position: supine       Skin prep: Chloraprep       Needle Type: short bevel       Needle Gauge: 20.        Needle Length (Inches): 6        Ultrasound guided       1. Ultrasound was used to identify targeted nerve, plexus, vascular marker, or fascial plane and place a needle adjacent to it in real-time.       2. Ultrasound was used to visualize the spread of anesthetic in close proximity to the above referenced structure.       3. A permanent image is entered into the patient's record.       4. The visualized anatomic structures appeared normal.    Assessment/Narrative         The placement was negative for: blood aspirated, painful injection and site bleeding       Paresthesias: No.       Bolus given via needle. no blood aspirated via catheter.        Secured via.        Insertion/Infusion Method: Single Shot       Injection made incrementally with aspirations every 5 mL.    Medication(s) Administered   Bupivacaine 0.25% PF (Infiltration) - Infiltration   20 mL - 6/22/2022 10:55:00 AM  Bupivacaine liposome (Exparel) 1.3% LA inj susp (Infiltration) - Infiltration   20 mL - 6/22/2022 10:55:00 AM  Medication Administration Time: 6/22/2022 10:55 AM

## 2022-06-22 NOTE — PROGRESS NOTES
CLINICAL NUTRITION SERVICES - ASSESSMENT NOTE     Nutrition Prescription    RECOMMENDATIONS FOR MDs/PROVIDERS TO ORDER:      Malnutrition Status:    Not noted with available data    Recommendations already ordered by Registered Dietitian (RD):  Provided written material for nutrition in lactation.    Future/Additional Recommendations:       REASON FOR ASSESSMENT  Shital Tamayo is a/an 27 year old female assessed by the dietitian for Reason For Assessment: LOS    NUTRITION HISTORY  Nutrition/Diet History  Typical Intake (Food/Fluid/EN/PN): Pt has been on general diet.  Factors Affecting Nutritional Intake: other (see comments) (Pt delivered her baby today.)  Additional Information: No nutrition risk indicators on asdmission assessment  CURRENT NUTRITION ORDERS  Nutrition  Diet/Nutrition Received: clear liquid (diet to advance to regular diet.)  Nutrition Interventions: other (see comments) (Provided written material on Nutrition Therapy for Lactation, gave to pt's RN.     LABS  Lab Results Reviewed: reviewed     MEDICATIONS  Pertinent Medications Reviewed: reviewed     ANTHROPOMETRICS  Height: 0 cm (Data Unavailable)  Most Recent Weight: 86.1 kg (189 lb 14.4 oz)      kg       Weight for Calculation (kg): 65.8 kg (145 lb 1 oz) (adjusted wt.)     ASSESSED NUTRITION NEEDS  Estimated Calorie Need Method: kcal/kg  Estimated Calorie Requirement (kcal/day): +  Kcal/k+  KCAL/KG  Kcal/k+ lkcal/kg  Additional Documentation: Protein Requirements (Group)  Protein Requirements  Estimated/Assessed Protein Need (g/day): 80+  Estimated/Assessed Protein Need (g/kg): 1.2 g/kg  Fluid Requirements  Estimated/Assessed Fluid Req (mL/day): 2000 ml+  Estimated/Assessed Fluid Requirements mL/K+  RDA Method (mL): +       PHYSICAL FINDINGS  See malnutrition section below.  Surgical incisions, per chart     MALNUTRITION  Final Malnutrition Summary  Malnutrition Criteria Met?: no (Per available data)    NUTRITION  DIAGNOSIS  Nutrition Diagnosis: Increased nutrient needs r/t lactation evidenced by breast-feeding.     INTERVENTIONS  Implementation  Nutrition Interventions: other (see comments) (Provided written material on Nutrition Therapy for Lactation)    Goals  Patient Goals:Nutrition Focus  Nutrition Goals:: PO  PO Goal:: PO >75%  PO goal status:: Ongoing, Progressing     Monitoring/Evaluation  Monitoring/Evaluation  Monitoring: Monitor patient per protocol

## 2022-06-22 NOTE — PLAN OF CARE
Problem: Hypertensive Disorders in Pregnancy  Goal: Maternal-Fetal Stabilization  Outcome: Ongoing, Progressing   Shital's BP has stabilized tonight, FHR Cat 1 most of night, now having late decels again.

## 2022-06-22 NOTE — PLAN OF CARE
Problem: Hypertensive Disorders in Pregnancy  Goal: Maternal-Fetal Stabilization  Outcome: Ongoing, Progressing     Problem: Postoperative Nausea and Vomiting (Postpartum  Delivery)  Goal: Nausea and Vomiting Relief  Outcome: Ongoing, Progressing   Patient stated she had nausea from the magnesium sulfate infusion. Reglan given with relief. Ambulated to NICU without difficulty. Patient has not voided independently since ramos removal at 1530.

## 2022-06-22 NOTE — ANESTHESIA CARE TRANSFER NOTE
Patient: Shital Tamayo    Procedure: Procedure(s):   SECTION       Diagnosis: Intrauterine growth restriction (IUGR) affecting care of mother [O36.5990]  Diagnosis Additional Information: No value filed.    Anesthesia Type:   Spinal     Note:    Oropharynx: oropharynx clear of all foreign objects and spontaneously breathing  Level of Consciousness: awake  Oxygen Supplementation: room air    Independent Airway: airway patency satisfactory and stable  Dentition: dentition unchanged  Vital Signs Stable: post-procedure vital signs reviewed and stable  Report to RN Given: handoff report given  Patient transferred to: Labor and Delivery    Handoff Report: Identifed the Patient, Identified the Reponsible Provider, Reviewed the pertinent medical history, Discussed the surgical course, Reviewed Intra-OP anesthesia mangement and issues during anesthesia, Set expectations for post-procedure period and Allowed opportunity for questions and acknowledgement of understanding      Vitals:  Vitals Value Taken Time   /87 22 1109   Temp     Pulse 60    Resp 16    SpO2 99    Vitals shown include unvalidated device data.    Electronically Signed By: KYLE Sorto CRNA  2022  11:14 AM

## 2022-06-22 NOTE — PROGRESS NOTES
Late entry due to patient care    Called to bedside to evaluate FHT. Patient had recurrent late decelerations after morphine dose, misoprostol. Also noted to be hypotensive to 70-80s/40s with normal pulse at 80. We discussed stopping induction given late decelerations, terbutaline given, fluid bolus, put in trendelenberg and gave two doses of 5 mg of ephedrine. Magnesium stopped for now until hypotension resolves. Consented for  and blood. Discussed risks including infection, bleeding, injury to surrounding organs/baby. Discussed risk/benefit of  tonight versus in the morning. We are weighing the fact that she has received Nubain, Flexeril and now IM Morphine today in addition to magnesium, being on an selective serotonin reuptake inhibitor and  fetal status with heart rate issues. We discussed the concern about fetal extreme sedation and need for significant resuscitation and possible Narcan if delivery necessitated tonight. Patient agreeable to  in the morning unless fetal status worsens tonight. Had applesauce a couple hours ago. Room set up to decrease obstacles to  if needed. SCDs in place. Pre-eclampsia labs including coags and magnesium level drawn.    Mary Herrera MD, FACOG, [facility]  2022 9:51 PM

## 2022-06-22 NOTE — PROGRESS NOTES
I was updated by Dr. Herrera regarding patient status last night.  She had a severe hypotensive episode, likely due to magnesium and multiple narcotics. Received terbutaline for non reassuring fetal status.  Shital strongly desires  section.  Will plan to proceed with  section due to fetal intolerance to labor and elective.     RISK - C SECTION    Patient counseled on risks, benefits, alternatives and expectations of  section.  Risks detailed to include, but not be limited to:  Pain, bleeding, infection, anesthesia complications, possible injury to bowel, bladder, baby and/or adjacent tissues, possible need for blood transfusion (with 1/50,000 risk of bloodborne pathogen [HIV and/or Hepatitis B/C] transmission) or even hysterectomy.  Patient voiced understanding of all R/B/A/E and has agreed to proceed with  section for delivery if needed or recommended.    Becki Nieves MD     3-5x/week

## 2022-06-22 NOTE — ANESTHESIA PROCEDURE NOTES
Intrathecal injection Procedure Note    Pre-Procedure   Staff -        Anesthesiologist:  Toro Aceves MD       Performed By: anesthesiologist       Location: OB       Procedure Start/Stop Times: 6/22/2022 9:50 AM and 6/22/2022 9:53 AM       Pre-Anesthestic Checklist: patient identified, IV checked, risks and benefits discussed, informed consent, monitors and equipment checked, pre-op evaluation, at physician/surgeon's request and post-op pain management  Timeout:       Correct Patient: Yes        Correct Procedure: Yes        Correct Site: Yes        Correct Position: Yes   Procedure Documentation  Procedure: intrathecal injection       Patient Position: sitting       Patient Prep/Sterile Barriers: sterile gloves, mask, patient draped       Skin prep: Chloraprep       Insertion Site: L3-4. (midline approach).       Needle Gauge: 24.        Needle Length (Inches): 3.5        Spinal Needle Type: Sprotte       Introducer used       # of attempts: 1 and  # of redirects:  0    Assessment/Narrative         Paresthesias: No.       Sensory Level: T10       CSF fluid: clear.       Opening pressure was cmH2O while  Sitting.      Medication(s) Administered   0.75% Hyperbaric Bupivacaine (Intrathecal) - Intrathecal   1.6 mL - 6/22/2022 9:50:00 AM  Morphine PF 1 mg/mL (Intrathecal) - Intrathecal   0.15 mg - 6/22/2022 9:50:00 AM  Medication Administration Time: 6/22/2022 9:50 AM

## 2022-06-22 NOTE — PROGRESS NOTES
Shital is comfortable and sleeping . Arouses easily however was disoriented to time when awakened. Is appropriate with questions and conversation. Denies HA.

## 2022-06-22 NOTE — OP NOTE
PROCEDURE NOTE:      NAME:  Shital Tamayo  RECORD [id number]  ADMIT DATE: 6/15/2022    DATE OF SERVICE: 22    PREOPERATIVE DIAGNOSIS:   27 year old  at 33w2d   Preeclampsia with severe features  Unremitting migraine headaches   Fetal growth restriction <1%ile  Large weight gain in pregnancy   Fetal intolerance to labor     POSTOPERATIVE DIAGNOSIS:   27 year old    Preeclampsia with severe features  Unremitting migraine headaches   Fetal growth restriction <1%ile  Large weight gain in pregnancy   Fetal intolerance to labor     PROCEDURE: Low transverse  section via pfannenstiel incision    SURGEON:  Becki Nieves     ASSISTANT: Mercedes Venegas, PGY1    ANESTHESIA: Spinal    ESTIMATED BLOOD LOSS: 769mL    DRAINS: Do catheter    COMPLICATIONS: None    FINDINGS:   --Normal uterus, tubes and ovaries   --Live 3lb 11.6oz male infant born, Apgars of 8 at 1 minute, 9 at 5 minutes    INDICATION:   28yo  who was admitted for pre-ecclampsia with severe features.  She had difficulty controlling migraines throughout her pregnancy and had unremitting migraines during her hospital stay. She had a normal MRI while inpatient which was normal.  Migraines became very difficult to control and there was concern that this could be worsening preeclampsia so recommendation was made to induce labor.  During induction she received narcotic medication for headaches as well as magnesium for seizure prophylaxis.  She became hypotensive followed by fetal decelerations.  At this point she requested to proceed with a  section which occurred the following morning due to concern of recent large dose of narcotic pain medication on a growth restricted premature infant.     CONSENT: Patient was met preoperatively where we discussed the procedure and the risks associated with the procedure.  She understood these to include but not limited to injury to adjacent organs including bowel, bladder, ureter,  infection and bleeding. Understanding these risks her consents were signed.      PROCEDURE: Patient was brought to the operating room in stable condition.  After induction of a spinal anesthetic, fetal heart tones were checked and were within normal limits. She was then prepared and draped in the normal sterile fashion in the dorsal supine position with a leftward tilt. A timeout was then performed. A Pfannenstiel skin incision was then made with a scalpel and carried through to the underlying layer of fascia. The fascia was incised in the midline and incision extended laterally with the Munoz scissors. The superior aspect of the fascial incision was then grasped with the Kocher clamps, elevated and the underlying rectus muscle dissected off bluntly. This was then repeated at the inferior aspect of the incision.The rectus muscles were then seperated in the midline, and the peritoneum identified, tented up, and entered sharply with the Metzenbuam scissors. The peritoneal incision was then extended superiorly and inferiorly with good visualization of the bladder. The bladder blade was then inserted and the vesicouterine peritoneum identified, grasped with the pick-ups, and entered sharply with the Metzenbaum scissors. This incision was then extended laterally and the bladder flap created digitally.    A low transverse uterine incision was made and amniotic sac was ruptured revealing clear amniotic fluid.  The baby's head was then delivered. There was not a nuchal cord noted. The remainder of the infant was easily delivered. Delayed cord clamping x 60 seconds. The cord was clamped x 2 and cut and the infant handed off to waiting nursing personnel.    The placenta was then delivered spontaneously from the uterus.  The uterus was exteriorized, covered with a moist laparotomy sponge and cleared of all clots and debris.  The uterine incision was closed with 0-0 vicryl in a running, locked fashion.  A second imbricating  suture of 0 vicryl was used for hemostasis. The uterus was returned to the abdominopelvic cavity.  The pericolic gutters were cleared of all clots and debris.  The uterine incision was again inspected and noted to be hemostatic. The fascia was brought together with 0-0 vicryl in a running nonlocking suture, met at the midline, the subcutaneous tissues were irrigated, made hemostatic with use of electrocautery and brought together with 3-0 plain.  Skin was closed with insorb staples.  Patient tolerated this procedure well.  Sponge, lap and needle counts were correct x two.    Becki Nieves MD    CC: Becki Nieves

## 2022-06-22 NOTE — ANESTHESIA PREPROCEDURE EVALUATION
Anesthesia Pre-Procedure Evaluation    Patient: Shital Tamayo   MRN: [id number] : 1995        Procedure : Procedure(s):   SECTION          Past Medical History:   Diagnosis Date     Anxiety      Depressive disorder       Past Surgical History:   Procedure Laterality Date     DILATION AND CURETTAGE N/A 3/1/2019    Procedure: DILATION AND CURETTAGE, UTERUS, USING SUCTION;  Surgeon: Becki Nieves MD;  Location: [facility] OR;  Service: Obstetrics      Allergies   Allergen Reactions     Ondansetron Anxiety, Dizziness and Other (See Comments)      Social History     Tobacco Use     Smoking status: Never Smoker     Smokeless tobacco: Never Used   Substance Use Topics     Alcohol use: No     Comment: Alcoholic Drinks/day: occasional use      Wt Readings from Last 1 Encounters:   22 86.1 kg (189 lb 14.4 oz)        Anesthesia Evaluation            ROS/MED HX  ENT/Pulmonary:  - neg pulmonary ROS     Neurologic:  - neg neurologic ROS     Cardiovascular:     (+) hypertension-----    METS/Exercise Tolerance:     Hematologic:  - neg hematologic  ROS     Musculoskeletal:  - neg musculoskeletal ROS     GI/Hepatic:  - neg GI/hepatic ROS     Renal/Genitourinary:  - neg Renal ROS     Endo:  - neg endo ROS     Psychiatric/Substance Use:     (+) psychiatric history depression     Infectious Disease:  - neg infectious disease ROS     Malignancy:  - neg malignancy ROS     Other:  - neg other ROS          Physical Exam    Airway  airway exam normal      Mallampati: II       Respiratory Devices and Support         Dental  no notable dental history         Cardiovascular   cardiovascular exam normal       Rhythm and rate: regular and normal     Pulmonary   pulmonary exam normal        breath sounds clear to auscultation           OUTSIDE LABS:  CBC:   Lab Results   Component Value Date    WBC 18.2 (H) 2022    WBC 13.1 (H) 2022    HGB 10.8 (L) 2022    HGB 12.1 2022    HCT 32.9 (L)  06/21/2022    HCT 31.5 (L) 06/19/2022     06/22/2022     06/21/2022     BMP:   Lab Results   Component Value Date     03/01/2019     09/24/2014    POTASSIUM 3.5 03/01/2019    POTASSIUM 3.8 09/24/2014    CHLORIDE 110 (H) 03/01/2019    CHLORIDE 107 09/24/2014    CO2 22 03/01/2019    CO2 25 09/24/2014    BUN 6 (L) 03/01/2019    BUN 8 09/24/2014    CR 0.75 06/21/2022    CR 0.63 06/19/2022    GLC 82 03/01/2019    GLC 86 09/24/2014     COAGS:   Lab Results   Component Value Date    PTT 27 06/22/2022    INR 1.07 06/22/2022     POC:   Lab Results   Component Value Date    HCG Negative 09/23/2014     HEPATIC:   Lab Results   Component Value Date    ALBUMIN 3.5 (L) 09/24/2014    PROTTOTAL 7.6 09/24/2014    ALT 11 06/21/2022    AST 10 06/21/2022    ALKPHOS 53 09/24/2014    BILITOTAL 0.7 09/24/2014     OTHER:   Lab Results   Component Value Date    OSWALDO 9.4 03/01/2019    MAG 3.9 (H) 06/21/2022    TSH 1.02 09/24/2014       Anesthesia Plan    ASA Status:  2      Anesthesia Type: Spinal.              Consents    Anesthesia Plan(s) and associated risks, benefits, and realistic alternatives discussed. Questions answered and patient/representative(s) expressed understanding.    - Discussed:     - Discussed with:  Patient      - Extended Intubation/Ventilatory Support Discussed: No.      - Patient is DNR/DNI Status: No    Use of blood products discussed: No .     Postoperative Care    Pain management: IV analgesics, Peripheral nerve block (Single Shot), intrathecal morphine.   PONV prophylaxis: Ondansetron (or other 5HT-3), Dexamethasone or Solumedrol     Comments:    Other Comments: SAB  ITN  TAP block for POP  GAETT backup  antiemetics            Toro Aceves MD

## 2022-06-23 LAB
ALT SERPL W P-5'-P-CCNC: 14 U/L (ref 0–45)
AST SERPL W P-5'-P-CCNC: 18 U/L (ref 0–40)
CREAT SERPL-MCNC: 0.61 MG/DL (ref 0.6–1.1)
ERYTHROCYTE [DISTWIDTH] IN BLOOD BY AUTOMATED COUNT: 14.6 % (ref 10–15)
GFR SERPL CREATININE-BSD FRML MDRD: >90 ML/MIN/1.73M2
HCT VFR BLD AUTO: 28.7 % (ref 35–47)
HGB BLD-MCNC: 9.3 G/DL (ref 11.7–15.7)
HGB BLD-MCNC: 9.7 G/DL (ref 11.7–15.7)
MCH RBC QN AUTO: 27.9 PG (ref 26.5–33)
MCHC RBC AUTO-ENTMCNC: 32.4 G/DL (ref 31.5–36.5)
MCV RBC AUTO: 86 FL (ref 78–100)
PLATELET # BLD AUTO: 220 10E3/UL (ref 150–450)
RBC # BLD AUTO: 3.33 10E6/UL (ref 3.8–5.2)
WBC # BLD AUTO: 15.5 10E3/UL (ref 4–11)

## 2022-06-23 PROCEDURE — 84460 ALANINE AMINO (ALT) (SGPT): CPT | Performed by: OBSTETRICS & GYNECOLOGY

## 2022-06-23 PROCEDURE — 82565 ASSAY OF CREATININE: CPT | Performed by: OBSTETRICS & GYNECOLOGY

## 2022-06-23 PROCEDURE — 85027 COMPLETE CBC AUTOMATED: CPT | Performed by: OBSTETRICS & GYNECOLOGY

## 2022-06-23 PROCEDURE — 36415 COLL VENOUS BLD VENIPUNCTURE: CPT | Performed by: OBSTETRICS & GYNECOLOGY

## 2022-06-23 PROCEDURE — 250N000011 HC RX IP 250 OP 636: Performed by: OBSTETRICS & GYNECOLOGY

## 2022-06-23 PROCEDURE — 250N000013 HC RX MED GY IP 250 OP 250 PS 637: Performed by: STUDENT IN AN ORGANIZED HEALTH CARE EDUCATION/TRAINING PROGRAM

## 2022-06-23 PROCEDURE — 250N000013 HC RX MED GY IP 250 OP 250 PS 637: Performed by: OBSTETRICS & GYNECOLOGY

## 2022-06-23 PROCEDURE — 85018 HEMOGLOBIN: CPT | Performed by: OBSTETRICS & GYNECOLOGY

## 2022-06-23 PROCEDURE — 120N000001 HC R&B MED SURG/OB

## 2022-06-23 PROCEDURE — 84450 TRANSFERASE (AST) (SGOT): CPT | Performed by: OBSTETRICS & GYNECOLOGY

## 2022-06-23 RX ADMIN — IBUPROFEN 800 MG: 800 TABLET ORAL at 18:06

## 2022-06-23 RX ADMIN — IBUPROFEN 800 MG: 800 TABLET ORAL at 11:49

## 2022-06-23 RX ADMIN — OXYCODONE HYDROCHLORIDE 5 MG: 5 TABLET ORAL at 16:01

## 2022-06-23 RX ADMIN — OXYCODONE HYDROCHLORIDE 5 MG: 5 TABLET ORAL at 11:55

## 2022-06-23 RX ADMIN — ACETAMINOPHEN 975 MG: 325 TABLET, FILM COATED ORAL at 22:10

## 2022-06-23 RX ADMIN — ACETAMINOPHEN 975 MG: 325 TABLET, FILM COATED ORAL at 16:01

## 2022-06-23 RX ADMIN — ACETAMINOPHEN 975 MG: 325 TABLET, FILM COATED ORAL at 09:51

## 2022-06-23 RX ADMIN — SENNOSIDES AND DOCUSATE SODIUM 1 TABLET: 8.6; 5 TABLET ORAL at 22:10

## 2022-06-23 RX ADMIN — ACETAMINOPHEN 975 MG: 325 TABLET, FILM COATED ORAL at 02:41

## 2022-06-23 RX ADMIN — OXYCODONE HYDROCHLORIDE 5 MG: 5 TABLET ORAL at 20:06

## 2022-06-23 RX ADMIN — KETOROLAC TROMETHAMINE 30 MG: 30 INJECTION, SOLUTION INTRAMUSCULAR at 05:14

## 2022-06-23 RX ADMIN — ESCITALOPRAM OXALATE 30 MG: 20 TABLET ORAL at 09:55

## 2022-06-23 RX ADMIN — SENNOSIDES AND DOCUSATE SODIUM 1 TABLET: 8.6; 5 TABLET ORAL at 09:41

## 2022-06-23 ASSESSMENT — ACTIVITIES OF DAILY LIVING (ADL)
ADLS_ACUITY_SCORE: 18

## 2022-06-23 NOTE — CONSULTS
"NOTE COPIED FROM 'S CHART:    INITIAL SOCIAL WORK NICU ASSESSMENT      DATA:    ETTA met with pts mom, Shital, in her postpartum room. She was sitting on the couch, tired appearing but was agreeable to meeting with SW at this time.     Reason for Social Work Consult: NICU admission     Living Situation: Shital reports living with her fiance Ryder. She reports this is their first baby.     Social Support: Shital reports that they have good family support.     Employment: Shital reports that she is currently out of work and will plan to get a new job sometime in the fall. She reports that Ryder works full time but does not get any time off. She does note that his job is somewhat flexible.     Insurance: Medicaid     Source of Financial Support: Employment. Shital denies getting any [facility] services such as [facility] or public health at this time.     Mental Health History: Shital reports a history of anxiety and depression and notes it has been \"my whole life.\" She reports that she feels stable from a mental health standpoint and felt stable throughout her pregnancy. She reports that she takes medication for management and has a therapist. She reports that she does not have any appointments scheduled with her therapist but can reach out to her as needed. She denies any suicidality.     History of Postpartum Mood Disorders: NA     Chemical Health History: Chart reviewed and no tox screen sent on pt or Shital.     INTERVENTION:        ETTA completed chart review and collaborated with the multidisciplinary team.     Psychosocial Assessment     Introduction to NICU  role and scope of practice     Discussed NICU experience and gave NICU welcome card    Reviewed Hospital and Community Resources     Assessed Chemical Health History and Current Symptoms     Assessed Mental Health History and Current Symptoms     Identified stressors, barriers and family concerns     Provided support and active empathetic listening and " "validation.     Provided psychoeducation on  mood and anxiety disorders, assessed for any current symptoms or history     ASSESSMENT:      Coping: Shital reports that she is feeling better physically. She reports doing fine with pts NICU admission and reports \"I know it is for the best.\" She denies any concerns at this time.     Affect: Flat, subdued     Mood: Not overly emotive during assessment, appeared tired     Motivation/Ability to Access Services: To be determined. On review it appears that Shital is on Medicaid so ETTA will reassess if Shital is interested in WIC. She does report having a therapist but it is unclear how often she is meeting with them as she does not have any appointments coming up. She denies any resource needs in this assessment. SW did provide parent resources for Shital to use as needed.     Assessment of Support System:  Strong, per Shital's report     Level of engagement with SW: Moderate- Shital participated willingly in the assessment but did not provide additional information beyond questions asked at this time.     Family's understanding of baby's medical situation:  Not assessed at this time.     Family and parent/infant interactions: Not assessed at this time.     Assessment of parental risk for PMAD: Moderate- increased due to history of anxiety and depression, early delivery and NICU admission. This increased risk was discussed with Shital. SW encouraged her to monitor how she is doing and reach out to her therapist when needed. SW encouraged her to have her family also assist in monitoring how she is doing from a mental health standpoint. Shital reports understanding of these recommendations.        PLAN:    SW informed Shital of plan for SW to follow and check in throughout NICU stay. Discussed that she can also reach out to SW as needed. She reports understanding and denies needs currently from SW.      DARREL Baker on 2022 at 3:26 PM            "

## 2022-06-23 NOTE — ANESTHESIA POSTPROCEDURE EVALUATION
Patient: Shital Tamayo    Procedure: Procedure(s):   SECTION       Anesthesia Type:  Spinal    Note:  Disposition: Inpatient   Postop Pain Control: Uneventful            Sign Out: Well controlled pain   PONV: No   Neuro/Psych: Uneventful            Sign Out: Acceptable/Baseline neuro status   Airway/Respiratory: Uneventful            Sign Out: Acceptable/Baseline resp. status   CV/Hemodynamics: Uneventful            Sign Out: Acceptable CV status; No obvious hypovolemia; No obvious fluid overload   Other NRE: NONE   DID A NON-ROUTINE EVENT OCCUR? No           Last vitals:  Vitals:    22 2054 22 2307 22 0258   BP: 91/48 92/48 105/55   Pulse: 74 64 67   Resp: 16 16 16   Temp: 36.7  C (98.1  F) 36.7  C (98  F) 36.9  C (98.4  F)   SpO2: 96% 98% 98%       Electronically Signed By: Toro Aceves MD  2022  7:29 AM

## 2022-06-23 NOTE — PROGRESS NOTES
Dr Maxwell notified of B/P, vital signs.Labatelol dose held at this time. Will reevaluate when pt returns from NICU.

## 2022-06-23 NOTE — PROGRESS NOTES
POSTPARTUM DAY #1:      Subjective:  The patient feels well. The patient has no emotional concerns. Pain is well controlled with current medications. The patient has been up and ambulating since her . She has had the catheter removed. She is passing gas and reports having BMs. The amount and color of the lochia is appropriate for the duration of recovery. The patient denies passing large clots. The baby is doing well.    Objective   The patient has a blood pressure which is within the normal range. Urinary output is adequate.     Exam:   /60 (BP Location: Right arm, Patient Position: Supine, Cuff Size: Adult Regular)   Pulse 68   Temp 97.8  F (36.6  C) (Oral)   Resp 16   Wt 86.1 kg (189 lb 14.4 oz)   LMP 2021   SpO2 97%   Breastfeeding Unknown   BMI 30.65 kg/m    General: NAD, alert and orientated   Abdomen: soft, NT   Fundus: firm, nontender  Incision: covered, C/D/I  Ext: no pain     LAB:  Lab Results   Component Value Date    HGB 9.7 (L) 2022         I/Os    Intake/Output Summary (Last 24 hours) at 2022 1357  Last data filed at 2022 0300  Gross per 24 hour   Intake 200 ml   Output 750 ml   Net -550 ml         Impression:   Normal postpartum course, POD#1 LTCS secondary to fetal intolerance to labor  Preeclampsia with severe features  Unremitting migraine headaches   Fetal growth restriction <1%ile  Large weight gain in pregnancy   Anemia of pregnancy    Plan:   Preeclampsia with Severe Features  - s/p Magnesium Sulfate  - currently not on antihypertensive meds, held here for low BPs  - continue to monitor BP's closely  - Notify on-call MD for any BP's over 160/110  ANEMIA of Pregnancy  - patients vital signs stable  - patient asymptomatic   - component of drop likely due to dilution with IV fluids being administered   POD#1  - switch to oral pain meds  - encourage ambulation   - Continue current care        VITOR Bobo  OB/GYN  [phone]

## 2022-06-23 NOTE — PROGRESS NOTES
Introduced to Pt, Shital is lying in her bed with SO next to her. Discuss plan of care for tonight and to call as needed for assistance. Pt states has voided 3 times since ramos removal. Pt denies pain /nausea at this time.

## 2022-06-23 NOTE — PLAN OF CARE
Problem: Pain Acute  Goal: Acceptable Pain Control and Functional Ability  Outcome: Ongoing, Progressing  Pt is taking pain medications when due in order to stay on top of pain management.

## 2022-06-23 NOTE — PLAN OF CARE
Problem: Plan of Care - These are the overarching goals to be used throughout the patient stay.    Goal: Optimal Comfort and Wellbeing  Outcome: Ongoing, Progressing  Intervention: Monitor Pain and Promote Comfort  Recent Flowsheet Documentation  Taken 2022 by Monique Alvarado RN  Pain Management Interventions: medication (see MAR)  Taken 2022 by Monique Alvarado RN  Pain Management Interventions: medication (see MAR)     Problem: Pain Acute  Goal: Acceptable Pain Control and Functional Ability  Outcome: Ongoing, Progressing  Intervention: Develop Pain Management Plan  Recent Flowsheet Documentation  Taken 2022 by Monique Alvarado RN  Pain Management Interventions: medication (see MAR)  Taken 2022 by Monique Alvarado RN  Pain Management Interventions: medication (see MAR)  Intervention: Optimize Psychosocial Wellbeing  Recent Flowsheet Documentation  Taken 2022 by Monique Alvarado RN  Supportive Measures:   active listening utilized   self-care encouraged   relaxation techniques promoted     Problem: Adjustment to Role Transition (Postpartum  Delivery)  Goal: Successful Maternal Role Transition  Outcome: Ongoing, Progressing  Intervention: Support Maternal Role Transition  Recent Flowsheet Documentation  Taken 2022 by Monique Alvarado RN  Supportive Measures:   active listening utilized   self-care encouraged   relaxation techniques promoted     Problem: Bleeding (Postpartum  Delivery)  Goal: Hemostasis  Outcome: Ongoing, Progressing     Problem: Postoperative Nausea and Vomiting (Postpartum  Delivery)  Goal: Nausea and Vomiting Relief  Outcome: Ongoing, Progressing     Problem: Hypertensive Disorders in Pregnancy  Goal: Maternal-Fetal Stabilization  Outcome: Met     Problem: Postoperative Urinary Retention (Postpartum  Delivery)  Goal: Effective Urinary Elimination  Outcome: Met     Problem: Plan of Care  - These are the overarching goals to be used throughout the patient stay.    Goal: Absence of Hospital-Acquired Illness or Injury  Intervention: Prevent and Manage VTE (Venous Thromboembolism) Risk  Recent Flowsheet Documentation  Taken 2022 193 by Monique Alvarado, RN  Activity Management: up ad sapna     Problem: Pain (Postpartum  Delivery)  Goal: Acceptable Pain Control  Intervention: Prevent or Manage Pain  Recent Flowsheet Documentation  Taken 2022 by Monique Alvarado, RN  Pain Management Interventions: medication (see MAR)  Taken 2022 by Monique Alvarado, RN  Pain Management Interventions: medication (see MAR)

## 2022-06-23 NOTE — PLAN OF CARE
Problem: Bleeding (Postpartum  Delivery)  Goal: Hemostasis  Outcome: Ongoing, Progressing     Bleeding acceptable, fundus firm      Problem: Postoperative Urinary Retention (Postpartum  Delivery)  Goal: Effective Urinary Elimination  Outcome: Ongoing, Progressing     Adequate output      Problem: Pain (Postpartum  Delivery)  Goal: Acceptable Pain Control  Outcome: Ongoing, Progressing     Shital is coping with pain      RN discussed with Dr. Benton, IV bags of pitocin and LR are complete and Shital really wants to be done with the medication. Vitals stable, blood pressure is actually slightly low. Output is okay. Also discussed labetalol. Orders received.

## 2022-06-24 VITALS
RESPIRATION RATE: 18 BRPM | BODY MASS INDEX: 30.65 KG/M2 | WEIGHT: 189.9 LBS | TEMPERATURE: 99 F | OXYGEN SATURATION: 97 % | SYSTOLIC BLOOD PRESSURE: 107 MMHG | DIASTOLIC BLOOD PRESSURE: 60 MMHG | HEART RATE: 81 BPM

## 2022-06-24 PROCEDURE — 250N000013 HC RX MED GY IP 250 OP 250 PS 637: Performed by: OBSTETRICS & GYNECOLOGY

## 2022-06-24 PROCEDURE — 250N000013 HC RX MED GY IP 250 OP 250 PS 637: Performed by: STUDENT IN AN ORGANIZED HEALTH CARE EDUCATION/TRAINING PROGRAM

## 2022-06-24 RX ORDER — OXYCODONE HYDROCHLORIDE 5 MG/1
5 TABLET ORAL EVERY 4 HOURS PRN
Qty: 12 TABLET | Refills: 0 | Status: SHIPPED | OUTPATIENT
Start: 2022-06-24 | End: 2023-05-17

## 2022-06-24 RX ORDER — ACETAMINOPHEN 325 MG/1
975 TABLET ORAL EVERY 6 HOURS
COMMUNITY
Start: 2022-06-24 | End: 2023-05-17

## 2022-06-24 RX ORDER — AMOXICILLIN 250 MG
2 CAPSULE ORAL 2 TIMES DAILY PRN
COMMUNITY
Start: 2022-06-24 | End: 2023-05-17

## 2022-06-24 RX ORDER — IBUPROFEN 800 MG/1
800 TABLET, FILM COATED ORAL EVERY 6 HOURS
COMMUNITY
Start: 2022-06-24

## 2022-06-24 RX ADMIN — OXYCODONE HYDROCHLORIDE 5 MG: 5 TABLET ORAL at 00:03

## 2022-06-24 RX ADMIN — OXYCODONE HYDROCHLORIDE 5 MG: 5 TABLET ORAL at 03:56

## 2022-06-24 RX ADMIN — OXYCODONE HYDROCHLORIDE 5 MG: 5 TABLET ORAL at 15:02

## 2022-06-24 RX ADMIN — ESCITALOPRAM OXALATE 30 MG: 20 TABLET ORAL at 11:33

## 2022-06-24 RX ADMIN — HYDROXYZINE HYDROCHLORIDE 100 MG: 50 TABLET, FILM COATED ORAL at 11:33

## 2022-06-24 RX ADMIN — ACETAMINOPHEN 975 MG: 325 TABLET, FILM COATED ORAL at 10:26

## 2022-06-24 RX ADMIN — IBUPROFEN 800 MG: 800 TABLET ORAL at 00:03

## 2022-06-24 RX ADMIN — SENNOSIDES AND DOCUSATE SODIUM 1 TABLET: 8.6; 5 TABLET ORAL at 10:25

## 2022-06-24 RX ADMIN — OXYCODONE HYDROCHLORIDE 5 MG: 5 TABLET ORAL at 10:25

## 2022-06-24 RX ADMIN — IBUPROFEN 800 MG: 800 TABLET ORAL at 10:24

## 2022-06-24 RX ADMIN — ACETAMINOPHEN 975 MG: 325 TABLET, FILM COATED ORAL at 03:55

## 2022-06-24 ASSESSMENT — ACTIVITIES OF DAILY LIVING (ADL)
ADLS_ACUITY_SCORE: 18

## 2022-06-24 NOTE — LACTATION NOTE
"This note was copied from a baby's chart.  This writer met with Shital to rent her a hospital grade breast pump, as she has a 3# 11.6 oz birth weight premature infant in the NICU and she is being discharged from the hospital and is wanting to make milk for infant.  This writer followed her to NICU and had her sign the rental agreement and reviewed the Initiate program on the breast pump.  This writer was given a second \"kit\" for the hospital grade breast pump so she can pump at infant's bedside.  She was shown how to hook up the breast pump.  Shital appeared very distracted and frequently looked away as teaching was given.  Encouraged Shital to ask for lactation prn.  She denies any questions at this time.    "

## 2022-06-24 NOTE — PLAN OF CARE
Pt is managing pain with tylenol, oxycodone, and ibuprofen.   Postpartum assessment WNL and VSS.     Problem: Hypertensive Disorders in Pregnancy  Goal: Maternal-Fetal Stabilization  Outcome: Ongoing, Progressing       Problem: Pain (Postpartum  Delivery)  Goal: Acceptable Pain Control  Outcome: Ongoing, Progressing     Problem: Postoperative Urinary Retention (Postpartum  Delivery)  Goal: Effective Urinary Elimination  Outcome: Ongoing, Progressing

## 2022-06-24 NOTE — PLAN OF CARE
Problem: Pain Acute  Goal: Acceptable Pain Control and Functional Ability  Outcome: Ongoing, Progressing     Problem: Bleeding (Postpartum  Delivery)  Goal: Hemostasis  Outcome: Ongoing, Progressing     Pt VSS and afebrile. Fundus firm and bleeding scant. Dressing previously marked and no additional drainage noted. Pain controlled with Ibuprofen, Tylenol, and Oxycodone. Patient up and walking in room and walking to NICU. Goal for patient is to pump every 3 hours. Will continue to monitor.

## 2022-06-24 NOTE — DISCHARGE SUMMARY
HOSPITAL DISCHARGE SUMMARY - C SECTION     NAME: Shital Tamayo   : 1995    MRN: [id number]    PCP: Siobhan Meyer See    ADMISSION DATE:  6/15/2022  GESTATIONAL AGE:  33w2d     DISCHARGE DATE:  2022    REASON FOR ADMISSION: Delivery of baby, prolonged observation for pre-eclampsia with severe features    DIAGNOSIS:    1.  Birth secondary to non reassuring fetal heart rate during induction for pre-eclampsia with severe features.  2. Apgars of 8   at 1 minute, 9  at 5 minutes  3. Weight (oz):  59.6  oz.  4. Acute blood loss anemia secondary to primary  section.    HISTORY OF PRESENT ILLNESS AND HOSPITAL COURSE: Shital Tamayo  is a 27 year old,  who was admitted for pre-eclampsia with severe features remote from term.  She was initiated on antihpyertensives, magnesium and received betamethasone.  Intent was for in house observation and delivery at 34 weeks however due to progressing CNS symptoms she began induction at 33 weeks and was delivered via  section.  Postoperatively she has done well. She denies chest pain, shortness of breath or dizziness. Patient denies headache, change in vision or upper abdominal pain.  On the day of discharge patient was tolerating diet, pain was controlled with oral medications, she was voiding and passing gas.    LABS:  Lab Results   Component Value Date    HGB 9.7 (L) 2022       EXAM:  Blood pressure 115/74, pulse 66, temperature 98.7  F (37.1  C), temperature source Oral, resp. rate 18, weight 86.1 kg (189 lb 14.4 oz), last menstrual period 2021, SpO2 97 %, unknown if currently breastfeeding.  General: NAD  Abdomen: Soft, non-tender  Uterine fundus: Firm, non-tender  Incision: C/D/I  Extremities: No pain, no edema    DISPOSITION:  Home    DISCHARGE CONDITION: Good/Stable    DISCHARGE MEDICATIONS:      Review of your medicines      START taking      Dose / Directions   ibuprofen 800 MG tablet  Commonly known as: ADVIL/MOTRIN  Used for:  Postpartum care and examination of lactating mother      Dose: 800 mg  Take 1 tablet (800 mg) by mouth every 6 hours  Refills: 0     oxyCODONE 5 MG tablet  Commonly known as: ROXICODONE  Used for: Postpartum care and examination of lactating mother      Dose: 5 mg  Take 1 tablet (5 mg) by mouth every 4 hours as needed for moderate to severe pain  Quantity: 12 tablet  Refills: 0     senna-docusate 8.6-50 MG tablet  Commonly known as: SENOKOT-S/PERICOLACE  Used for: Postpartum care and examination of lactating mother      Dose: 2 tablet  Take 2 tablets by mouth 2 times daily as needed for constipation  Refills: 0        CONTINUE these medicines which may have CHANGED, or have new prescriptions. If we are uncertain of the size of tablets/capsules you have at home, strength may be listed as something that might have changed.      Dose / Directions   * acetaminophen 325 MG tablet  Commonly known as: TYLENOL  This may have changed: Another medication with the same name was added. Make sure you understand how and when to take each.      Dose: 325-650 mg  Take 325-650 mg by mouth every 6 hours as needed for headaches  Refills: 0     * acetaminophen 325 MG tablet  Commonly known as: TYLENOL  This may have changed: You were already taking a medication with the same name, and this prescription was added. Make sure you understand how and when to take each.  Used for: Postpartum care and examination of lactating mother      Dose: 975 mg  Take 3 tablets (975 mg) by mouth every 6 hours  Refills: 0         * This list has 2 medication(s) that are the same as other medications prescribed for you. Read the directions carefully, and ask your doctor or other care provider to review them with you.            CONTINUE these medicines which have NOT CHANGED      Dose / Directions   escitalopram 20 MG tablet  Commonly known as: LEXAPRO      Dose: 30 mg  Take 30 mg by mouth daily  Refills: 0     hydrOXYzine 50 MG tablet  Commonly known as:  ATARAX  Used for: Anxiety      Dose: 100 mg  Take 2 tablets (100 mg) by mouth 3 times daily as needed for anxiety  Quantity: 40 tablet  Refills: 0     labetalol 100 MG tablet  Commonly known as: NORMODYNE  Used for: Elevated blood pressure affecting pregnancy in third trimester, antepartum      Dose: 100 mg  Take 1 tablet (100 mg) by mouth every 12 hours  Quantity: 28 tablet  Refills: 0     prenatal multivitamin w/iron 27-0.8 MG tablet      Dose: 1 tablet  Take 1 tablet by mouth daily  Refills: 0           Where to get your medicines      Some of these will need a paper prescription and others can be bought over the counter. Ask your nurse if you have questions.    Bring a paper prescription for each of these medications    oxyCODONE 5 MG tablet  You don't need a prescription for these medications    acetaminophen 325 MG tablet    ibuprofen 800 MG tablet    senna-docusate 8.6-50 MG tablet           DISCHARGE PLAN:   - Follow up with  MD, in 1-2 weeks  - Take medication as prescribed.  Continue prenatal vitamin and iron rich food such as spinach and fortified cereal.  - Physical activity: As tolerated, no heavy lifting. Pelvic rest.  - Diet:  Regular  - Medication:  Please see MAR  - Warning signs discussed with patient about when to call the clinic/hospital  - All questions and concerns were answered for the patient prior to discharge.       Maru Gibbons MD FACOG  [facility]  [phone]

## 2022-06-24 NOTE — PROGRESS NOTES
Dr. Gibbons informed by phone that surgeon's order isn't clear in terms of whether dressing is to be removed or changed. MD states that patient can be instructed to remove dressing herself at 7 days, unless it begins to peel up at the edges or becomes wet. If so, she may remove the dressing sooner.

## 2022-06-25 ENCOUNTER — PATIENT OUTREACH (OUTPATIENT)
Dept: CARE COORDINATION | Facility: CLINIC | Age: 27
End: 2022-06-25

## 2022-06-25 DIAGNOSIS — Z71.89 OTHER SPECIFIED COUNSELING: ICD-10-CM

## 2022-06-25 NOTE — PROGRESS NOTES
Clinic Care Coordination Contact  [facility]/Voicemail       Clinical Data: Care Coordinator Outreach  Outreach attempted x 1.  Left message on patient's voicemail with call back information and requested return call.  Plan: Care Coordinator will try to reach patient again in 1-2 business days.    .Ana LICEA Community Health Worker  Clinic Care Coordination  [facility]  Phone: [phone]

## 2022-06-26 NOTE — PROGRESS NOTES
"Clinic Care Coordination Contact  [facility]: Post-Discharge Note  SITUATION                                                      Admission:    Admission Date: 06/15/22   Reason for Admission: Delivery of baby, prolonged observation for pre-eclampsia with severe features  Discharge:   Discharge Date: 22  Discharge Diagnosis: 1.  Birth secondary to non reassuring fetal heart rate during induction for pre-eclampsia with severe features.  2. Apgars of 8   at 1 minute, 9  at 5 minutes  3. Weight (oz):  59.6  oz.    BACKGROUND                                                      Per hospital discharge summary and inpatient provider notes:    Shital Tamayo  is a 27 year old,  who was admitted for pre-eclampsia with severe features remote from term.  She was initiated on antihpyertensives, magnesium and received betamethasone.  Intent was for in house observation and delivery at 34 weeks however due to progressing CNS symptoms she began induction at 33 weeks and was delivered via  section.  Postoperatively she has done well. She denies chest pain, shortness of breath or dizziness. Patient denies headache, change in vision or upper abdominal pain.  On the day of discharge patient was tolerating diet, pain was controlled with oral medications, she was voiding and passing gas.    ASSESSMENT      Enrollment  Primary Care Care Coordination Status: Not a Candidate    Discharge Assessment  How are you doing now that you are home?: \"good!\"  How are your symptoms? (Red Flag symptoms escalate to triage hotline per guidelines): Improved  Do you feel your condition is stable enough to be safe at home until your provider visit?: Yes  Does the patient have their discharge instructions? : Yes  Does the patient have questions regarding their discharge instructions? : No  Were you started on any new medications or were there changes to any of your previous medications? : Yes (using oxycodone, tylenol and " ibuprofen to manage incision pain)  Does the patient have all of their medications?: Yes  Do you have questions regarding any of your medications? : No  Do you have all of your needed medical supplies or equipment (DME)?  (i.e. oxygen tank, CPAP, cane, etc.):  (NA)         Post-op (Clinicians Only)  Did the patient have surgery or a procedure: Yes  Incision: closed (covered with bandage)  Drainage: No  Bleeding: none  Fever: No  Chills: No  Redness: No  Warmth: No  Swelling: No  Incision site pain: Yes (managed as above)  Closure:  (patient isn't sure.)  Eating & Drinking: eating and drinking without complaints/concerns  PO Intake: regular diet  Additional Symptoms:  (none)  Bowel Function: normal  Date of last BM: 06/25/22  Urinary Status: voiding without complaint/concerns    Baby, Coleman, remains in NICU. Patient visits daily.    PLAN                                                      Outpatient Plan:  Patient will call tomorrow to schedule post-op visit in 1-2 weeks per recommendation of discharging provider.    Future Appointments   Date Time Provider Department Center   6/27/2022 12:00 AM [facility]         For any urgent concerns, please contact our 24 hour nurse triage line: [phone] ([phone])         Naida Rodriguez RN  [facility] Care Resource [facility]

## 2022-06-27 LAB
PATH REPORT.COMMENTS IMP SPEC: NORMAL
PATH REPORT.COMMENTS IMP SPEC: NORMAL
PATH REPORT.FINAL DX SPEC: NORMAL
PATH REPORT.GROSS SPEC: NORMAL
PATH REPORT.MICROSCOPIC SPEC OTHER STN: NORMAL
PATH REPORT.RELEVANT HX SPEC: NORMAL
PHOTO IMAGE: NORMAL

## 2022-06-27 PROCEDURE — 88307 TISSUE EXAM BY PATHOLOGIST: CPT | Mod: 26 | Performed by: PATHOLOGY

## 2022-06-30 ENCOUNTER — LACTATION ENCOUNTER (OUTPATIENT)
Age: 27
End: 2022-06-30

## 2022-06-30 NOTE — LACTATION NOTE
This note was copied from a baby's chart.  Lactation consultant to patient room per mother's request. Mom states she has started taking Topomax (L-3) and Tizanidine (L-4) medications and wondering if baby should be given her pumped milk.  She also requests information regarding Aderall (L-3), which she is hoping to start again soon. Printed pages from Dino Ulloa's medication and Mother's Milk book on all three medications and give to mother. Will also report to bedside RN to discuss with medical team before offering mother's EBM because Tizanidine is  Listed as an L-4 potentially hazardous medication.

## 2022-09-10 ENCOUNTER — HEALTH MAINTENANCE LETTER (OUTPATIENT)
Age: 27
End: 2022-09-10

## 2022-11-02 ENCOUNTER — VIRTUAL VISIT (OUTPATIENT)
Dept: FAMILY MEDICINE | Facility: CLINIC | Age: 27
End: 2022-11-02
Payer: COMMERCIAL

## 2022-11-02 DIAGNOSIS — G43.811 OTHER MIGRAINE WITH STATUS MIGRAINOSUS, INTRACTABLE: Primary | ICD-10-CM

## 2022-11-02 DIAGNOSIS — F11.90 OPIOID USE DISORDER: ICD-10-CM

## 2022-11-02 PROBLEM — F11.10 OPIOID ABUSE (H): Status: ACTIVE | Noted: 2022-11-02

## 2022-11-02 PROCEDURE — 99204 OFFICE O/P NEW MOD 45 MIN: CPT | Mod: 95 | Performed by: PHYSICIAN ASSISTANT

## 2022-11-02 RX ORDER — TOPIRAMATE 200 MG/1
200 TABLET, FILM COATED ORAL DAILY
Qty: 90 TABLET | Refills: 1 | Status: SHIPPED | OUTPATIENT
Start: 2022-11-02

## 2022-11-02 RX ORDER — PROCHLORPERAZINE MALEATE 10 MG
10 TABLET ORAL EVERY 6 HOURS PRN
Qty: 20 TABLET | Refills: 1 | Status: SHIPPED | OUTPATIENT
Start: 2022-11-02 | End: 2023-05-17

## 2022-11-02 RX ORDER — TOPIRAMATE 100 MG/1
100 TABLET, FILM COATED ORAL DAILY
COMMUNITY
Start: 2022-10-21 | End: 2022-11-02

## 2022-11-02 RX ORDER — DEXTROAMPHETAMINE SACCHARATE, AMPHETAMINE ASPARTATE, DEXTROAMPHETAMINE SULFATE AND AMPHETAMINE SULFATE 7.5; 7.5; 7.5; 7.5 MG/1; MG/1; MG/1; MG/1
30 TABLET ORAL 2 TIMES DAILY PRN
COMMUNITY
Start: 2022-10-26 | End: 2023-05-17

## 2022-11-02 RX ORDER — SUMATRIPTAN 50 MG/1
50 TABLET, FILM COATED ORAL
Qty: 30 TABLET | Refills: 1 | Status: SHIPPED | OUTPATIENT
Start: 2022-11-02 | End: 2023-06-15

## 2022-11-02 NOTE — PROGRESS NOTES
"Shital is a 27 year old who is being evaluated via a billable video visit.      How would you like to obtain your AVS? MyChart  If the video visit is dropped, the invitation should be resent by: Send to e-mail at: [email or web address]  Will anyone else be joining your video visit? No        Assessment & Plan   Problem List Items Addressed This Visit        Nervous and Auditory    Other migraine with status migrainosus, intractable - Primary    Relevant Medications    SUMAtriptan (IMITREX) 50 MG tablet    prochlorperazine (COMPAZINE) 10 MG tablet    topiramate (TOPAMAX) 200 MG tablet    Other Relevant Orders    Adult Neurology  Referral       Other    Opioid use disorder      Increase Topamax to 200 mg daily for migraine prevention  Imitrex  mg at onset, may repeat in 2 hours, max dose is 200 mg per day   Compazine 10 mg every 6 hours as needed   Make appointment with neurology to address migraines.    Today's request for opiod prescription was denied.   According to [facility] records patient has received 26 prescriptions of different type of opioids in the last 12 months and appears it was used during pregnancy as well.   In the month of October alone patient had 5 prescription of various opioids Codeine, Hydrocodone and Percocet.   Last prescription was for Percocet 5-325 for 55 tablets on 10/21/22  This is a definitely a red flag and a sign of opioid use disorder.         17 minutes spent on the date of the encounter doing chart review, history and exam, documentation and further activities per the note       BMI:   Estimated body mass index is 30.65 kg/m  as calculated from the following:    Height as of 5/25/22: 1.676 m (5' 6\").    Weight as of 6/21/22: 86.1 kg (189 lb 14.4 oz).           Return in about 5 days (around 11/7/2022), or if symptoms worsen or fail to improve, for in person.    Sowmya Spann PA-C  [facility]    Mary Isaacs is a 27 year old, " "presenting for the following health issues:  Headache      History of Present Illness       Headaches:   Since the patient's last clinic visit, headaches are: worsened  The patient is getting headaches:  Has been a constant migraine for 4 days.  She is not able to do normal daily activities when she has a migraine.  The patient is taking the following rescue/relief medications:  Ibuprofen (Advil, Motrin), Tylenol and other   Patient states \"I get no relief\" from the rescue/relief medications.   The patient is taking the following medications to prevent migraines:  Topomax  In the past 4 weeks, the patient has gone to an Urgent Care or Emergency Room 0 times times due to headaches.    She eats 2-3 servings of fruits and vegetables daily.She consumes 1 sweetened beverage(s) daily.She exercises with enough effort to increase her heart rate 30 to 60 minutes per day.  She exercises with enough effort to increase her heart rate 7 days per week.   She is taking medications regularly.       Migraine     Since your last clinic visit, how have your headaches changed?  No change    How often are you getting headaches or migraines? 4 per month      Are you able to do normal daily activities when you have a migraine? No    Are you taking rescue/relief medications? (Select all that apply) ibuprofen (Advil, Motrin), Tylenol and Other: Benadryl, tizanidine     How helpful is your rescue/relief medication?  I get no relief    Are you taking any medications to prevent migraines? (Select all that apply)  Topamax 100mg daily -stopped working     In the past 4 weeks, how often have you gone to urgent care or the emergency room because of your headaches?  0    Patient is reporting that Imitrex has worked in the past along Compazine.  Patient is also asking for an opioid prescription \"to help break the cycle of pain\".   Patient reports that she has 4 months old son and she needs to take care of him, but is unable due to headache pain. "   Review of Systems   Constitutional, HEENT, cardiovascular, pulmonary, gi and gu systems are negative, except as otherwise noted.      Objective           Vitals:  No vitals were obtained today due to virtual visit.    Physical Exam   GENERAL: alert and mild distress  EYES: Eyes grossly normal to inspection.  No discharge or erythema, or obvious scleral/conjunctival abnormalities.  NECK: No asymmetry, visible masses or scars  RESP: No audible wheeze, cough, or visible cyanosis.  No visible retractions or increased work of breathing.    SKIN: Visible skin clear. No significant rash, abnormal pigmentation or lesions.  NEURO: Cranial nerves grossly intact.  Mentation and speech appropriate for age.  PSYCH: Mentation appears normal, affect normal/bright, judgement and insight intact, normal speech and appearance well-groomed.                Video-Visit Details    Video Start Time: 9:02 AM    Type of service:  Video Visit    Video End Time:9:17 AM    Originating Location (pt. Location): Home    Distant Location (provider location):  On-site    Platform used for Video Visit: Lisa

## 2022-11-02 NOTE — PATIENT INSTRUCTIONS
At [facility], we strive to deliver an exceptional experience to you, every time we see you. If you receive a survey, please complete it as we do value your feedback.  If you have MyChart, you can expect to receive results automatically within 24 hours of their completion.  Your provider will send a note interpreting your results as well.   If you do not have MyChart, you should receive your results in about a week by mail.    Your care team:                            Family Medicine Internal Medicine   MD Noe Gardner MD Shantel Branch-Fleming, MD Srinivasa Vaka, MD Katya Belousova, PAKYLE Kelly CNP, MD ([facility]   Manny Brown, MD Tammi Salazar MD Amelia Massimini APRN TRINI Deal APRN MD Rush Ko MD          Clinic hours: Monday - Thursday 7 am-6 pm; Fridays 7 am-5 pm.   Urgent care: Monday - Friday 10 am- 8 pm; Saturday and Sunday 9 am-5 pm.    Clinic: [phone]       [facility]: Monday - Thursday 8 am - 7 pm; Friday 8 am - 6 pm  [facility]: [phone]

## 2023-01-05 ENCOUNTER — VIRTUAL VISIT (OUTPATIENT)
Dept: URGENT CARE | Facility: CLINIC | Age: 28
End: 2023-01-05
Payer: COMMERCIAL

## 2023-01-05 DIAGNOSIS — G43.811 OTHER MIGRAINE WITH STATUS MIGRAINOSUS, INTRACTABLE: Primary | ICD-10-CM

## 2023-01-05 PROCEDURE — 99213 OFFICE O/P EST LOW 20 MIN: CPT | Mod: 95

## 2023-01-05 NOTE — PATIENT INSTRUCTIONS
Aleve (naproxen sodium) 500mg every 12 hours  Tylenol (acetaminophen) 1000mg every 6-8 hours  Benadryl (diphenhydramine) 50mg   Drink a full glass of water and take a nap in a cool dark room  Compazine every 6-8 hours if nauseated  Imitrex (sumatriptan) 50 at onset of headache. May repeat in 2 hours if headache is persistent. No more than 200mg in 24 hours

## 2023-01-05 NOTE — PROGRESS NOTES
Assessment & Plan     Other migraine with status migrainosus, intractable  Rescue medications as discussed. Benadryl, compazine and imitrex. See neurology for further evaluation if migraines become more persistent.      Neuro evaluation consult was ordered 11/2022. She was prescribed 30 tablets of each Imitrex and Compazine, each with 1 refill in November so these are not refilled today.  audit shows she receives 20 tablets of oxycodone from her primary care provider monthly, most recently 12/20/22.    Return in about 1 week (around 1/12/2023) for visit with primary care provider if not improving.     Ladi Guillen PA-C  [facility]    Subjective   Shital Tamayo is a 27 year old who presents for the following health issues    HPI    Shital presents via telephone for evaluation of a headache.  She states that she has a history of migraines which are currently managed well.  The last 3 to 4 days she is having migraine headache that has been difficult to control.  She is not able to function at work.  She has had stress recently dog has been sick and she has not been getting much sleep.  She does take Topamax 100 mg daily for migraine prevention.  When she has migraine, she states that she usually uses Benadryl, Imitrex, Compazine and oxycodone for abortive therapy. She states she does not have any of these meds at home now.    Review of Systems   ROS negative except as stated above.      Objective    Physical Exam   GENERAL: Healthy, alert and no distress  EYES: Eyes grossly normal to inspection.  No discharge or erythema, or obvious scleral/conjunctival abnormalities.  HENT: Normal cephalic/atraumatic.  External ears, nose and mouth without ulcers or lesions.  No nasal drainage visible.  RESP: No audible cough wheeze or visible cyanosis.  No visible retractions or increased work of breathing.    SKIN: Visible skin clear. No significant rash, abnormal pigmentation or lesions.  NEURO:  Cranial nerves grossly intact.  Mentation and speech appropriate for age.  PSYCH: Mentation appears normal, affect normal/bright, judgement and insight intact, normal speech and appearance well-groomed.    Type of service:  Video Visit- could not hear, poor video quality  Telephone visit completed  Call duration 7 minutes  Originating Location (pt. Location): Home  Distant Location (provider location):  [facility] URGENT [facility]ARE- offsite at [facility]  Platform used for Video Visit: RozinaWell

## 2023-05-17 ENCOUNTER — OFFICE VISIT (OUTPATIENT)
Dept: FAMILY MEDICINE | Facility: CLINIC | Age: 28
End: 2023-05-17
Payer: COMMERCIAL

## 2023-05-17 ENCOUNTER — TELEPHONE (OUTPATIENT)
Dept: FAMILY MEDICINE | Facility: CLINIC | Age: 28
End: 2023-05-17

## 2023-05-17 VITALS
OXYGEN SATURATION: 99 % | RESPIRATION RATE: 16 BRPM | TEMPERATURE: 98.5 F | DIASTOLIC BLOOD PRESSURE: 82 MMHG | SYSTOLIC BLOOD PRESSURE: 116 MMHG | HEIGHT: 66 IN | BODY MASS INDEX: 23.72 KG/M2 | HEART RATE: 100 BPM | WEIGHT: 147.6 LBS

## 2023-05-17 DIAGNOSIS — F90.0 ATTENTION DEFICIT HYPERACTIVITY DISORDER (ADHD), PREDOMINANTLY INATTENTIVE TYPE: ICD-10-CM

## 2023-05-17 DIAGNOSIS — G43.719 INTRACTABLE CHRONIC MIGRAINE WITHOUT AURA AND WITHOUT STATUS MIGRAINOSUS: Primary | ICD-10-CM

## 2023-05-17 PROCEDURE — 99213 OFFICE O/P EST LOW 20 MIN: CPT

## 2023-05-17 RX ORDER — DEXTROAMPHETAMINE SACCHARATE, AMPHETAMINE ASPARTATE, DEXTROAMPHETAMINE SULFATE AND AMPHETAMINE SULFATE 7.5; 7.5; 7.5; 7.5 MG/1; MG/1; MG/1; MG/1
30 TABLET ORAL 2 TIMES DAILY
Qty: 60 TABLET | Refills: 0 | Status: SHIPPED | OUTPATIENT
Start: 2023-05-17 | End: 2023-05-17

## 2023-05-17 RX ORDER — DEXTROAMPHETAMINE SACCHARATE, AMPHETAMINE ASPARTATE, DEXTROAMPHETAMINE SULFATE AND AMPHETAMINE SULFATE 7.5; 7.5; 7.5; 7.5 MG/1; MG/1; MG/1; MG/1
30 TABLET ORAL 2 TIMES DAILY
Qty: 60 TABLET | Refills: 0 | Status: CANCELLED | OUTPATIENT
Start: 2023-05-17

## 2023-05-17 RX ORDER — MELOXICAM 15 MG/1
TABLET ORAL
COMMUNITY
Start: 2023-05-12

## 2023-05-17 RX ORDER — OXYCODONE AND ACETAMINOPHEN 5; 325 MG/1; MG/1
TABLET ORAL
Qty: 25 TABLET | Refills: 0 | Status: CANCELLED | OUTPATIENT
Start: 2023-05-17

## 2023-05-17 RX ORDER — DEXTROAMPHETAMINE SACCHARATE, AMPHETAMINE ASPARTATE, DEXTROAMPHETAMINE SULFATE AND AMPHETAMINE SULFATE 7.5; 7.5; 7.5; 7.5 MG/1; MG/1; MG/1; MG/1
30 TABLET ORAL 2 TIMES DAILY
Qty: 60 TABLET | Refills: 0 | Status: SHIPPED | OUTPATIENT
Start: 2023-05-17 | End: 2023-06-15

## 2023-05-17 RX ORDER — ESCITALOPRAM OXALATE 10 MG/1
10 TABLET ORAL DAILY
COMMUNITY

## 2023-05-17 RX ORDER — OXYCODONE AND ACETAMINOPHEN 5; 325 MG/1; MG/1
TABLET ORAL
Qty: 25 TABLET | Refills: 0 | Status: SHIPPED | OUTPATIENT
Start: 2023-05-17 | End: 2023-06-15

## 2023-05-17 RX ORDER — OXYCODONE AND ACETAMINOPHEN 5; 325 MG/1; MG/1
TABLET ORAL
COMMUNITY
End: 2023-05-17

## 2023-05-17 RX ORDER — PRENATAL VIT/IRON FUM/FOLIC AC 27MG-0.8MG
1 TABLET ORAL DAILY
Qty: 90 TABLET | Refills: 0 | Status: SHIPPED | OUTPATIENT
Start: 2023-05-17 | End: 2023-08-15

## 2023-05-17 ASSESSMENT — ENCOUNTER SYMPTOMS: HEADACHES: 1

## 2023-05-17 NOTE — PROGRESS NOTES
Assessment & Plan     Intractable chronic migraine without aura and without status migrainosus  Patient here for medications to manage her chronic migraines, patient reports migraines 2-3 times a week that are only helped by Percocet with Benadryl/compazine.  Patient has 10-month-old at home and per her report was taking Percocet throughout her pregnancy which was prescribed by OB due to her severe migraines.  More recently patient been receiving this medication from her primary care provider at [facility], patient reports previous provider is no longer providing these medications as she came back positive for codeine on a urine drug screen.  Most recent medication on the PDMP is codeine prescribed for an animal.  She denies taking codeine for an animal, reports she did not even know this was possible.  She reports codeine that came back on her urine drug screen at outside clinic was something she had at home from previous prescription.  This prescription is not noted in the PDMP.  Patient is sent to neuro for migraine management and given bridge prescription short-term.  Patient informed I will not be continuing to prescribe this medication.  - Prenatal Vit-Fe Fumarate-FA (PRENATAL MULTIVITAMIN W/IRON) 27-0.8 MG tablet; Take 1 tablet by mouth daily for 90 days  - Neurology  Referral (Migraine Care Package); Future  - naloxone (NARCAN) 4 MG/0.1ML nasal spray; Spray 1 spray (4 mg) into one nostril alternating nostrils once as needed for opioid reversal every 2-3 minutes until assistance arrives  - oxyCODONE-acetaminophen (PERCOCET) 5-325 MG tablet; oxycodone-acetaminophen 5 mg-325 mg tablet  TAKE 1 TABLET BY MOUTH EVERY 6 HOURS AS NEEDED FOR PAIN    Attention deficit hyperactivity disorder (ADHD), predominantly inattentive type    ADHD diagnosis primarily inattentive.  [facility]  History of been taking Adderall since that time.  Patient not experiencing side  "effects and feels symptoms are well controlled.  Prescription refilled.  - amphetamine-dextroamphetamine (ADDERALL) 30 MG tablet; Take 1 tablet (30 mg) by mouth 2 times daily for 30 days                   KYLE Hansen CNP  [facility] in [facility] on [facility] for adderall    Subjective   Shital is a 28 year old, presenting for the following health issues:  A.D.H.D (Med f/u) and Headache  Had pap smear in past year.        View : No data to display.              Shital is a 28-year-old female ambulatory to clinic accompanied by self.  She reports she has been being seen at [facility] for 10 years for headaches. Was taking topomax and percocet during pregnancy. On Friday went to see regular doctor and did UDS and had codeine in her system. Has taken several clean urine tests in the past.     Has not had medication for 1 week. Had a migraine yesterday, has migraines 2x/week. Combination that works has been precocet, compazine or benadryl has been helpful in the past.     A.D.H.D  Associated symptoms include headaches.   Headache     History of Present Illness       Headaches:   Since the patient's last clinic visit, headaches are: no change  The patient is getting headaches:  Weekly  She is not able to do normal daily activities when she has a migraine.  The patient is taking the following rescue/relief medications:  Ibuprofen (Advil, Motrin), Naproxyn (Aleve), Tylenol, Excedrin and sumatriptan (Imitrex)   Patient states \"The relief is inconsistent\" from the rescue/relief medications.   The patient is taking the following medications to prevent migraines:  Topomax  In the past 4 weeks, the patient has gone to an Urgent Care or Emergency Room 0 times times due to headaches.    Reason for visit:  Medications    She eats 4 or more servings of fruits and vegetables daily.She consumes 0 sweetened beverage(s) daily.She exercises with enough effort to increase her heart rate 20 to 29 " minutes per day.  She exercises with enough effort to increase her heart rate 4 days per week.   She is taking medications regularly.    SUBJECTIVE:   Shital Tamayo is a 28 year old female who complains of migraine headache not currently pregnant but occurring 2-3 times a week. She has a well established history of recurrent migraines.  She was seen approximately 1 year ago by neurology for this.    Description of pain: dull pain, unilateral but varies as to which side.  Associated symptoms: nausea, vomiting and photophobia.  No aura    Current Outpatient Medications   Medication Sig Dispense Refill     acetaminophen (TYLENOL) 325 MG tablet Take 325-650 mg by mouth every 6 hours as needed for headaches       amphetamine-dextroamphetamine (ADDERALL) 30 MG tablet Take 30 mg by mouth 2 times daily as needed       hydrOXYzine (ATARAX) 50 MG tablet Take 2 tablets (100 mg) by mouth 3 times daily as needed for anxiety 40 tablet 0     ibuprofen (ADVIL/MOTRIN) 800 MG tablet Take 1 tablet (800 mg) by mouth every 6 hours       meloxicam (MOBIC) 15 MG tablet        oxyCODONE-acetaminophen (PERCOCET) 5-325 MG tablet oxycodone-acetaminophen 5 mg-325 mg tablet   TAKE 1 TABLET BY MOUTH EVERY 6 HOURS AS NEEDED FOR PAIN       SUMAtriptan (IMITREX) 50 MG tablet Take 1 tablet (50 mg) by mouth at onset of headache for migraine May repeat in 2 hours. Max 4 tablets/24 hours. 30 tablet 1     tiZANidine (ZANAFLEX) 4 MG tablet Take 4 mg by mouth every 6 hours as needed       topiramate (TOPAMAX) 200 MG tablet Take 1 tablet (200 mg) by mouth daily 90 tablet 1     escitalopram (LEXAPRO) 10 MG tablet Take 10 mg by mouth daily         Nausea and vomiting with headaches, photophobia, no aura. Usually unilateral frontal and switches sides.   Had neuro psych eval done in  Aug 2019 by Dr. Rajan Mclean and diagnosed with ADHD predominately inattentive.           Review of Systems   Neurological: Positive for headaches.      Constitutional, HEENT,  "cardiovascular, pulmonary, gi and gu systems are negative, except as otherwise noted.      Objective    /82 (BP Location: Right arm, Patient Position: Sitting, Cuff Size: Adult Large)   Pulse 100   Temp 98.5  F (36.9  C) (Oral)   Resp 16   Ht 1.676 m (5' 6\")   Wt 67 kg (147 lb 9.6 oz)   LMP 04/30/2023 (Exact Date)   SpO2 99%   Breastfeeding No   BMI 23.82 kg/m    Body mass index is 23.82 kg/m .  Physical Exam   GENERAL: healthy, alert and no distress  NECK: no adenopathy, no asymmetry, masses, or scars and thyroid normal to palpation  RESP: lungs clear to auscultation - no rales, rhonchi or wheezes  CV: regular rate and rhythm, normal S1 S2, no S3 or S4, no murmur, click or rub, no peripheral edema and peripheral pulses strong  ABDOMEN: soft, nontender, no hepatosplenomegaly, no masses and bowel sounds normal  MS: no gross musculoskeletal defects noted, no edema                    "

## 2023-05-17 NOTE — TELEPHONE ENCOUNTER
Due to Adderall shortage, please send this script to    [id number] - [place], [place] - [address] AT [facility] [address]    They are out at her regular Pharmacy. Patient is requesting this be marked urgent as she is completely out.

## 2023-06-03 ENCOUNTER — HEALTH MAINTENANCE LETTER (OUTPATIENT)
Age: 28
End: 2023-06-03

## 2023-06-13 ENCOUNTER — TELEPHONE (OUTPATIENT)
Dept: FAMILY MEDICINE | Facility: CLINIC | Age: 28
End: 2023-06-13
Payer: COMMERCIAL

## 2023-06-13 NOTE — TELEPHONE ENCOUNTER
Attempt x2 to reach pt to call and resched appt with Dr Singleton on 6.14.23    If pt calls back to resched please help with assisting with rescheduling this appt

## 2023-06-15 ENCOUNTER — VIRTUAL VISIT (OUTPATIENT)
Dept: FAMILY MEDICINE | Facility: CLINIC | Age: 28
End: 2023-06-15
Payer: COMMERCIAL

## 2023-06-15 DIAGNOSIS — F90.0 ATTENTION DEFICIT HYPERACTIVITY DISORDER (ADHD), PREDOMINANTLY INATTENTIVE TYPE: ICD-10-CM

## 2023-06-15 DIAGNOSIS — G43.719 INTRACTABLE CHRONIC MIGRAINE WITHOUT AURA AND WITHOUT STATUS MIGRAINOSUS: ICD-10-CM

## 2023-06-15 PROCEDURE — 99213 OFFICE O/P EST LOW 20 MIN: CPT | Mod: VID

## 2023-06-15 RX ORDER — DEXTROAMPHETAMINE SACCHARATE, AMPHETAMINE ASPARTATE, DEXTROAMPHETAMINE SULFATE AND AMPHETAMINE SULFATE 7.5; 7.5; 7.5; 7.5 MG/1; MG/1; MG/1; MG/1
30 TABLET ORAL 2 TIMES DAILY
Qty: 60 TABLET | Refills: 0 | Status: SHIPPED | OUTPATIENT
Start: 2023-07-15

## 2023-06-15 RX ORDER — SUMATRIPTAN 50 MG/1
50 TABLET, FILM COATED ORAL
Qty: 20 TABLET | Refills: 1 | Status: SHIPPED | OUTPATIENT
Start: 2023-06-15

## 2023-06-15 RX ORDER — DEXTROAMPHETAMINE SACCHARATE, AMPHETAMINE ASPARTATE, DEXTROAMPHETAMINE SULFATE AND AMPHETAMINE SULFATE 7.5; 7.5; 7.5; 7.5 MG/1; MG/1; MG/1; MG/1
30 TABLET ORAL 2 TIMES DAILY
Qty: 60 TABLET | Refills: 0 | Status: SHIPPED | OUTPATIENT
Start: 2023-06-15

## 2023-06-15 RX ORDER — OXYCODONE AND ACETAMINOPHEN 5; 325 MG/1; MG/1
TABLET ORAL
Qty: 25 TABLET | Refills: 0 | Status: SHIPPED | OUTPATIENT
Start: 2023-06-15

## 2023-06-15 RX ORDER — DEXTROAMPHETAMINE SACCHARATE, AMPHETAMINE ASPARTATE, DEXTROAMPHETAMINE SULFATE AND AMPHETAMINE SULFATE 7.5; 7.5; 7.5; 7.5 MG/1; MG/1; MG/1; MG/1
30 TABLET ORAL 2 TIMES DAILY
Qty: 60 TABLET | Refills: 0 | Status: SHIPPED | OUTPATIENT
Start: 2023-08-15

## 2023-06-15 NOTE — PROGRESS NOTES
Shital is a 28 year old who is being evaluated via a billable video visit.      How would you like to obtain your AVS? MyChart  If the video visit is dropped, the invitation should be resent by: Text to cell phone: [phone]  Will anyone else be joining your video visit? No        Assessment & Plan     Intractable chronic migraine without aura and without status migrainosus  Chronic migraines, she reports once per week on average, this is a decrease from previous report of 2 to 3/week.  She has not had Imitrex which she reports helps to stop migraines before they start, this is refilled today and instructions for use reviewed.  We also refilled her Percocet today and she is instructed to use this sparingly, this supply should hopefully last her for 3 months until our next follow-up visit.  Patient has been referred to neurology for management of migraines and discussed recommendation to decrease narcotic dependence with these migraines.  Verbalizes understanding and has follow-up with neurology scheduled for September.  - SUMAtriptan (IMITREX) 50 MG tablet; Take 1 tablet (50 mg) by mouth at onset of headache for migraine May repeat in 2 hours. Max 4 tablets/24 hours.  - oxyCODONE-acetaminophen (PERCOCET) 5-325 MG tablet; oxycodone-acetaminophen 5 mg-325 mg tablet  TAKE 1 TABLET BY MOUTH EVERY 6 HOURS AS NEEDED FOR PAIN  - PRIMARY CARE FOLLOW-UP SCHEDULING; Future    Attention deficit hyperactivity disorder (ADHD), predominantly inattentive type  Patient denies any changes to symptoms feels that ADHD is well controlled on Adderall.  Her medication is refilled with staggered prescriptions for 3 months at which time we will have a in person follow-up as ordered.  - amphetamine-dextroamphetamine (ADDERALL) 30 MG tablet; Take 1 tablet (30 mg) by mouth 2 times daily  - amphetamine-dextroamphetamine (ADDERALL) 30 MG tablet; Take 1 tablet (30 mg) by mouth 2 times daily  - amphetamine-dextroamphetamine (ADDERALL) 30 MG  tablet; Take 1 tablet (30 mg) by mouth 2 times daily                 KYLE Hansen CNP [facility]    Mary Isaacs is a 28 year old, presenting for the following health issues:  A.D.H.D and Pain Management        5/17/2023    12:52 PM   Additional Questions   Roomed by Shelby     Shital is a 28-year-old female seen via video visit.  She is here for follow-up and medication refill related to migraines.  Migraines are about the same, had one last night with vomiting. Took meloxicam, tylenol and tizanidine and benadryl.  Migraines have not developed new symptoms.  Gets some dizziness with migraines but no vision changes. Migraines lasting approx a day but can be shortened if she takes medication at onset. No aura with migraines.     At last visit it was discussed with patient that it was recommended that we decrease her Percocet use with these migraines and patient was referred to neurology.  She has made follow-up but is not able to be seen until September.    Does not have any imitrex. Will take percocet when migraines are bad. Having 1 migraine per week.  Percocet is refilled today and again discussed using this medication sparingly especially with desire to become pregnant    She is trying to get pregnant. Her youngest is 1 year.  She reports the prenatal vitamins as previously prescribed make her nauseous and we discussed alternate prenatal vitamins including whole food vitamins that may produce less nausea.  It is again stressed to patient's the importance of taking prenatal vitamins at time of conception and in early developmental stages of pregnancy to prevent spinal cord disorders.               Review of Systems   Constitutional, HEENT, cardiovascular, pulmonary, gi and gu systems are negative, except as otherwise noted.      Objective           Vitals:  No vitals were obtained today due to virtual visit.    Physical Exam   GENERAL: Healthy, alert and no  distress  EYES: Eyes grossly normal to inspection.  No discharge or erythema, or obvious scleral/conjunctival abnormalities.  RESP: No audible wheeze, cough, or visible cyanosis.  No visible retractions or increased work of breathing.    SKIN: Visible skin clear. No significant rash, abnormal pigmentation or lesions.  NEURO: Cranial nerves grossly intact.  Mentation and speech appropriate for age.  PSYCH: Mentation appears normal, affect normal/bright, judgement and insight intact, normal speech and appearance well-groomed.                Video-Visit Details    Type of service:  Video Visit   Video Start Time: 10:04am  Video End Time:10:17am    Originating Location (pt. Location): Home    Distant Location (provider location):  On-site  Platform used for Video Visit: RozinaWell

## 2023-07-13 ENCOUNTER — MYC MEDICAL ADVICE (OUTPATIENT)
Dept: FAMILY MEDICINE | Facility: CLINIC | Age: 28
End: 2023-07-13
Payer: COMMERCIAL

## 2023-07-13 DIAGNOSIS — G43.719 INTRACTABLE CHRONIC MIGRAINE WITHOUT AURA AND WITHOUT STATUS MIGRAINOSUS: ICD-10-CM

## 2023-07-13 RX ORDER — OXYCODONE AND ACETAMINOPHEN 5; 325 MG/1; MG/1
TABLET ORAL
Qty: 25 TABLET | Refills: 0 | OUTPATIENT
Start: 2023-07-13

## 2023-07-13 NOTE — TELEPHONE ENCOUNTER
Last office visit: 5/17/2023     Future Appointments 7/13/2023 - 1/9/2024      Date Visit Type Length Department Provider     9/27/2023  9:00 AM NEW HEADACHE 60 min [facility] Jessica Zapien APRN CNP    Location Instructions:     Located in the Clinics and Surgery Center at [address] [place] [address]. For parking options, enter the [facility] /arrival plaza from [facility] and attendants can assist you based on your needs.  parking is available for those with limited mobility M-F from 7 a.m. to 5 p.m. Due to short staffing, we are unable to offer  to all patients/visitors. Visit [email or web address] for more details.  Self-parking:&nbsp;  West Lot: Located across from the main entrance, this is a convenient option for patients. Enter on [facility]. Parking attendants available most hours to assist.&nbsp;     [facility] Ramp: Enter at the [facility] [address] entrance (one block north of the [facility] main entrance). Do not enter the ramp from [facility] - this entrance is not staffed and is further from the [facility] main entrance.                   Requested Prescriptions   Pending Prescriptions Disp Refills     oxyCODONE-acetaminophen (PERCOCET) 5-325 MG tablet 25 tablet 0     Sig: oxycodone-acetaminophen 5 mg-325 mg tablet  TAKE 1 TABLET BY MOUTH EVERY 6 HOURS AS NEEDED FOR PAIN       There is no refill protocol information for this order

## 2023-07-13 NOTE — TELEPHONE ENCOUNTER
Patient needs to be seen in clinic for refill. Please call to schedule. Once appt is scheduled, route back to me.

## 2023-07-14 ENCOUNTER — TELEPHONE (OUTPATIENT)
Dept: FAMILY MEDICINE | Facility: CLINIC | Age: 28
End: 2023-07-14
Payer: COMMERCIAL

## 2023-07-14 NOTE — TELEPHONE ENCOUNTER
General Call    Contacts       Type Contact Phone/Fax    07/14/2023 02:54 PM CDT Phone (Incoming) Shital Tamayo (Self) [phone] ()        Reason for Call:  Pt scheduled a med check appt for 7/20 and was told to have me send a message to the clinic for a 'bridge medication' Can you call pt and let her that it's done? Thank you!    [facility] or would you prefer to receive a phone call?:   Patient would prefer a phone call   Okay to leave a detailed message?: Yes at Home number on file [phone] (home)

## 2023-07-14 NOTE — TELEPHONE ENCOUNTER
Called patient as patient has sent multiple messages requesting a Percocet.  Previous note from/15 patient was prescribed 25 Percocet with reported migraines of once weekly.  Reports she has been having them more frequently and has used up the Percocet that was discussed and meant to last for 3 months based on frequency of migraines.    Patient informed no additional Percocet at this time and we can discuss a plan to avoid worsening Percocet dependence while still trying to control migraine pain at her next visit.    KYLE Hansen CNP on 7/14/2023 at 5:06 PM

## 2023-07-16 NOTE — TELEPHONE ENCOUNTER
RECORDS RECEIVED FROM:    REASON FOR VISIT: Headaches    Date of Appt: 9/27/2023   NOTES (FOR ALL VISITS) STATUS DETAILS   OFFICE NOTE from referring provider Luis Singleton-6/15/2023   OFFICE NOTE from other specialist     DISCHARGE SUMMARY from hospital     DISCHARGE REPORT from the ER     OPERATIVE REPORT     [facility] (MS ONLY)     EMG     EEG     MEDICATION LIST     IMAGING  (FOR ALL VISITS)     LUMBAR PUNCTURE     MICHAEL SCAN (MOVEMENT)     ULTRASOUND (CAROTID BILAT) *VASCULAR*     MRI (HEAD, NECK, SPINE) PACS  MR Brain-6/15/2022   CT (HEAD, NECK, SPINE)

## 2023-09-27 ENCOUNTER — PRE VISIT (OUTPATIENT)
Dept: NEUROLOGY | Facility: CLINIC | Age: 28
End: 2023-09-27

## 2024-07-07 ENCOUNTER — HEALTH MAINTENANCE LETTER (OUTPATIENT)
Age: 29
End: 2024-07-07

## 2024-07-12 ENCOUNTER — LAB REQUISITION (OUTPATIENT)
Dept: LAB | Facility: CLINIC | Age: 29
End: 2024-07-12

## 2024-07-12 DIAGNOSIS — Z34.90 ENCOUNTER FOR SUPERVISION OF NORMAL PREGNANCY, UNSPECIFIED, UNSPECIFIED TRIMESTER: ICD-10-CM

## 2024-07-12 PROCEDURE — 87086 URINE CULTURE/COLONY COUNT: CPT | Performed by: NURSE PRACTITIONER

## 2024-07-13 LAB — BACTERIA UR CULT: NO GROWTH

## 2024-08-03 ENCOUNTER — APPOINTMENT (OUTPATIENT)
Dept: ULTRASOUND IMAGING | Facility: HOSPITAL | Age: 29
End: 2024-08-03
Attending: EMERGENCY MEDICINE
Payer: COMMERCIAL

## 2024-08-03 ENCOUNTER — TELEPHONE (OUTPATIENT)
Dept: EMERGENCY MEDICINE | Facility: HOSPITAL | Age: 29
End: 2024-08-03

## 2024-08-03 ENCOUNTER — HOSPITAL ENCOUNTER (EMERGENCY)
Facility: HOSPITAL | Age: 29
Discharge: HOME | End: 2024-08-03
Attending: EMERGENCY MEDICINE | Admitting: EMERGENCY MEDICINE
Payer: COMMERCIAL

## 2024-08-03 VITALS
DIASTOLIC BLOOD PRESSURE: 73 MMHG | RESPIRATION RATE: 16 BRPM | OXYGEN SATURATION: 98 % | HEART RATE: 86 BPM | SYSTOLIC BLOOD PRESSURE: 124 MMHG | TEMPERATURE: 98.3 F

## 2024-08-03 DIAGNOSIS — R07.9 CHEST PAIN, UNSPECIFIED TYPE: ICD-10-CM

## 2024-08-03 LAB
ALBUMIN UR-MCNC: NEGATIVE MG/DL
AMPHETAMINES UR QL SCN: NORMAL
ANION GAP SERPL CALCULATED.3IONS-SCNC: 10 MMOL/L (ref 7–15)
APPEARANCE UR: CLEAR
BACTERIA #/AREA URNS HPF: ABNORMAL /HPF
BARBITURATES UR QL SCN: NORMAL
BASOPHILS # BLD AUTO: 0.1 10E3/UL (ref 0–0.2)
BASOPHILS NFR BLD AUTO: 1 %
BENZODIAZ UR QL SCN: NORMAL
BILIRUB UR QL STRIP: NEGATIVE
BUN SERPL-MCNC: 8.1 MG/DL (ref 6–20)
BZE UR QL SCN: NORMAL
CALCIUM SERPL-MCNC: 8.9 MG/DL (ref 8.8–10.4)
CANNABINOIDS UR QL SCN: NORMAL
CHLORIDE SERPL-SCNC: 106 MMOL/L (ref 98–107)
COLOR UR AUTO: COLORLESS
CREAT SERPL-MCNC: 0.61 MG/DL (ref 0.51–0.95)
D DIMER PPP FEU-MCNC: <0.27 UG/ML FEU (ref 0–0.5)
EGFRCR SERPLBLD CKD-EPI 2021: >90 ML/MIN/1.73M2
EOSINOPHIL # BLD AUTO: 0 10E3/UL (ref 0–0.7)
EOSINOPHIL NFR BLD AUTO: 0 %
ERYTHROCYTE [DISTWIDTH] IN BLOOD BY AUTOMATED COUNT: 12.4 % (ref 10–15)
ETHANOL SERPL-MCNC: <0.01 G/DL
FENTANYL UR QL: NORMAL
FLUAV RNA SPEC QL NAA+PROBE: NEGATIVE
FLUBV RNA RESP QL NAA+PROBE: NEGATIVE
GLUCOSE SERPL-MCNC: 97 MG/DL (ref 70–99)
GLUCOSE UR STRIP-MCNC: NEGATIVE MG/DL
HCO3 SERPL-SCNC: 22 MMOL/L (ref 22–29)
HCT VFR BLD AUTO: 38.4 % (ref 35–47)
HGB BLD-MCNC: 13 G/DL (ref 11.7–15.7)
HGB UR QL STRIP: NEGATIVE
IMM GRANULOCYTES # BLD: 0 10E3/UL
IMM GRANULOCYTES NFR BLD: 0 %
INR PPP: 1.06 (ref 0.85–1.15)
KETONES UR STRIP-MCNC: NEGATIVE MG/DL
LEUKOCYTE ESTERASE UR QL STRIP: NEGATIVE
LYMPHOCYTES # BLD AUTO: 1.5 10E3/UL (ref 0.8–5.3)
LYMPHOCYTES NFR BLD AUTO: 16 %
MAGNESIUM SERPL-MCNC: 1.7 MG/DL (ref 1.7–2.3)
MCH RBC QN AUTO: 30.1 PG (ref 26.5–33)
MCHC RBC AUTO-ENTMCNC: 33.9 G/DL (ref 31.5–36.5)
MCV RBC AUTO: 89 FL (ref 78–100)
MONOCYTES # BLD AUTO: 0.7 10E3/UL (ref 0–1.3)
MONOCYTES NFR BLD AUTO: 7 %
NEUTROPHILS # BLD AUTO: 7.5 10E3/UL (ref 1.6–8.3)
NEUTROPHILS NFR BLD AUTO: 77 %
NITRATE UR QL: NEGATIVE
NRBC # BLD AUTO: 0 10E3/UL
NRBC BLD AUTO-RTO: 0 /100
NT-PROBNP SERPL-MCNC: 86 PG/ML (ref 0–450)
OPIATES UR QL SCN: NORMAL
PCP QUAL URINE (ROCHE): NORMAL
PH UR STRIP: 6.5 [PH] (ref 5–7)
PLATELET # BLD AUTO: 255 10E3/UL (ref 150–450)
POTASSIUM SERPL-SCNC: 4 MMOL/L (ref 3.4–5.3)
RBC # BLD AUTO: 4.32 10E6/UL (ref 3.8–5.2)
RBC URINE: <1 /HPF
RSV RNA SPEC NAA+PROBE: NEGATIVE
SARS-COV-2 RNA RESP QL NAA+PROBE: NEGATIVE
SODIUM SERPL-SCNC: 138 MMOL/L (ref 135–145)
SP GR UR STRIP: 1 (ref 1–1.03)
SQUAMOUS EPITHELIAL: 1 /HPF
TROPONIN T SERPL HS-MCNC: <6 NG/L
TSH SERPL DL<=0.005 MIU/L-ACNC: 0.5 UIU/ML (ref 0.3–4.2)
UROBILINOGEN UR STRIP-MCNC: <2 MG/DL
WBC # BLD AUTO: 9.7 10E3/UL (ref 4–11)
WBC URINE: <1 /HPF

## 2024-08-03 PROCEDURE — 87637 SARSCOV2&INF A&B&RSV AMP PRB: CPT | Performed by: EMERGENCY MEDICINE

## 2024-08-03 PROCEDURE — 84443 ASSAY THYROID STIM HORMONE: CPT | Performed by: EMERGENCY MEDICINE

## 2024-08-03 PROCEDURE — 80048 BASIC METABOLIC PNL TOTAL CA: CPT | Performed by: EMERGENCY MEDICINE

## 2024-08-03 PROCEDURE — 258N000003 HC RX IP 258 OP 636: Performed by: EMERGENCY MEDICINE

## 2024-08-03 PROCEDURE — 84484 ASSAY OF TROPONIN QUANT: CPT | Performed by: EMERGENCY MEDICINE

## 2024-08-03 PROCEDURE — 96360 HYDRATION IV INFUSION INIT: CPT

## 2024-08-03 PROCEDURE — 83735 ASSAY OF MAGNESIUM: CPT | Performed by: EMERGENCY MEDICINE

## 2024-08-03 PROCEDURE — 76801 OB US < 14 WKS SINGLE FETUS: CPT

## 2024-08-03 PROCEDURE — 81001 URINALYSIS AUTO W/SCOPE: CPT | Performed by: EMERGENCY MEDICINE

## 2024-08-03 PROCEDURE — 36415 COLL VENOUS BLD VENIPUNCTURE: CPT | Performed by: EMERGENCY MEDICINE

## 2024-08-03 PROCEDURE — 93005 ELECTROCARDIOGRAM TRACING: CPT | Performed by: EMERGENCY MEDICINE

## 2024-08-03 PROCEDURE — 83880 ASSAY OF NATRIURETIC PEPTIDE: CPT | Performed by: EMERGENCY MEDICINE

## 2024-08-03 PROCEDURE — 82077 ASSAY SPEC XCP UR&BREATH IA: CPT | Performed by: EMERGENCY MEDICINE

## 2024-08-03 PROCEDURE — 99285 EMERGENCY DEPT VISIT HI MDM: CPT | Mod: 25

## 2024-08-03 PROCEDURE — 85610 PROTHROMBIN TIME: CPT | Performed by: EMERGENCY MEDICINE

## 2024-08-03 PROCEDURE — 85025 COMPLETE CBC W/AUTO DIFF WBC: CPT | Performed by: EMERGENCY MEDICINE

## 2024-08-03 PROCEDURE — 96361 HYDRATE IV INFUSION ADD-ON: CPT

## 2024-08-03 PROCEDURE — 80307 DRUG TEST PRSMV CHEM ANLYZR: CPT | Performed by: EMERGENCY MEDICINE

## 2024-08-03 PROCEDURE — 85379 FIBRIN DEGRADATION QUANT: CPT | Performed by: EMERGENCY MEDICINE

## 2024-08-03 RX ADMIN — SODIUM CHLORIDE 1000 ML: 9 INJECTION, SOLUTION INTRAVENOUS at 14:33

## 2024-08-03 ASSESSMENT — ACTIVITIES OF DAILY LIVING (ADL)
ADLS_ACUITY_SCORE: 35

## 2024-08-03 ASSESSMENT — COLUMBIA-SUICIDE SEVERITY RATING SCALE - C-SSRS
6. HAVE YOU EVER DONE ANYTHING, STARTED TO DO ANYTHING, OR PREPARED TO DO ANYTHING TO END YOUR LIFE?: NO
1. IN THE PAST MONTH, HAVE YOU WISHED YOU WERE DEAD OR WISHED YOU COULD GO TO SLEEP AND NOT WAKE UP?: NO
2. HAVE YOU ACTUALLY HAD ANY THOUGHTS OF KILLING YOURSELF IN THE PAST MONTH?: NO

## 2024-08-03 NOTE — DISCHARGE INSTRUCTIONS
You are leaving the ER against medical advice without an x-ray of your lungs so we could make sure that you are safe and not having any medical complications in your lungs that may be visible on x-ray. If you feel any worse, please come back to the ER so we can make sure you are safe.    On your fetal ultrasound your baby MAY be developing a hernia. It is very important for you to follow up with your ob/gyn team so they can keep an eye on future ultrasounds to help you to plan appropriately. It is important to follow up with your ob/gyn on Monday so they can recheck your blood pressure in their office and determine if they want to change your blood pressure medication to nifedipine, please continue taking your labetalol in the interim.

## 2024-08-03 NOTE — ED PROVIDER NOTES
EMERGENCY DEPARTMENT ENCOUNTER      NAME: Shital Tamayo  AGE: 29 year old female  YOB: 1995  MRN: [id number]  EVALUATION DATE & TIME: 8/3/2024  1:50 PM    PCP: Siobhan Carter See    ED PROVIDER: Maia Burnham M.D.      Chief Complaint   Patient presents with    Chest Pain    Shortness of Breath         FINAL IMPRESSION:  1. Chest pain, unspecified type          ED COURSE & MEDICAL DECISION MAKING:    ED Course as of 08/03/24 1656   Sat Aug 03, 2024   1416 Paitent with some chest discomfort, near syncope and SOB for four days without h/o PE, no leg swelling, normal VS on my examination, penidn gddimer to r/o PE per YEARS criteria, EKG, troponin, BNP, TSH and electorlytes, fetal US and IVF begun and will monitor and serially reassess, patient ok with plan.   1536 Troponin <6 thus ACS ruled out. Ddimer < 0.27 thus per YEARS criteria PE ruled out. UA without cells ot suggest UTI, and viral PCR negative. TSH WNL, BNP undetectable, VS normalized and CBC and chemistry WNL, normal INR and mangesium. UDS Negative, pending CXR and fetal US   1627 Patient declined CXR. Will reassess now.   1634 Paitent clinically reassessed by me, refuses CXR and wants to leave against medical advice without XR read of her heart/lungs to ensure all is ok and no other acute pathology is present to account for chest pain or SOB, aware without X-ray I cannot ensure we adequately ruled out pathology, which could potentially worsen or cause problems for her or her pregnancy, she declines and states she personally attributes her symptoms to elevated BP although BP is not elevated reassuringly. Patient discharged after being provided with extensive anticipatory guidance and given return precautions, importance of PMD follow-up emphasized. Independent interpretation of fetal US whicih she did request despite no vaginal bleeding or discharge and with no abodminal pain reveals live IUP, question re: potential umbilical herniation thus metro  jailyn paged to discuss planning   1654 I spoke with Mary Herrera from [facility] ob/gyn re: case, they will closely f/u with her re: BP rechecks and on labetalol for BP, aware of ED presentation and don't think her baby is developing a hernia as rotational findings, they will closely f/u with her Monday in office to determine if they will recommend changing to nifedipine if BP elevated in the office.        2:17 PM I met the patient and performed my initial interview and exam. Discussed workup in the emergency department, management of symptoms, and likely disposition.      Pertinent Labs & Imaging studies reviewed. (See chart for details)      At the conclusion of the encounter I discussed the results of all of the tests and the disposition. The questions were answered. The patient or family acknowledged understanding and was agreeable with the care plan.     MEDICATIONS GIVEN IN THE EMERGENCY:  Medications   sodium chloride 0.9% BOLUS 1,000 mL (1,000 mLs Intravenous $New Bag 8/3/24 4518)       NEW PRESCRIPTIONS STARTED AT TODAY'S ER VISIT  New Prescriptions    No medications on file          =================================================================    HPI      Shital Tamayo is a 29 year old female with PMHx of an extensive psych history, chronic recurrent syncope since 11+ years and pre-eclampsia who presents to the ED today via walk in with chest heaviness and shortness of breath.    The patient is currently 12 weeks pregnant. She endorses 4.5 days of chest heaviness and shortness of breath, describing that she will need to stop [facility] while walking up the stairs to catch her breath. She also reports near-syncope when standing up. Patient had a syncopal episode in 2013 but is unsure what caused this episode then.     The patient also reports high blood pressure at home with the most recent one being 115/110 today. She is on labetalol and last took it at 1 PM today. She denies any history of blood clots.  No diarrhea, vomiting, cough, fever, vaginal bleeding, vaginal discharge, dysuria, hematuria, leg swelling, recent sick contacts or any other complaints at this time.    REVIEW OF SYSTEMS   All other systems reviewed and are negative except as noted above in HPI.    PAST MEDICAL HISTORY:  Past Medical History:   Diagnosis Date    Anxiety     Depressive disorder        PAST SURGICAL HISTORY:  Past Surgical History:   Procedure Laterality Date     SECTION N/A 2022    Procedure:  SECTION;  Surgeon: Becki Nieves MD;  Location: [facility]    DILATION AND CURETTAGE N/A 3/1/2019    Procedure: DILATION AND CURETTAGE, UTERUS, USING SUCTION;  Surgeon: Becki Nieves MD;  Location: [facility];  Service: Obstetrics       CURRENT MEDICATIONS:    acetaminophen (TYLENOL) 325 MG tablet  amphetamine-dextroamphetamine (ADDERALL) 30 MG tablet  amphetamine-dextroamphetamine (ADDERALL) 30 MG tablet  amphetamine-dextroamphetamine (ADDERALL) 30 MG tablet  escitalopram (LEXAPRO) 10 MG tablet  hydrOXYzine (ATARAX) 50 MG tablet  ibuprofen (ADVIL/MOTRIN) 800 MG tablet  meloxicam (MOBIC) 15 MG tablet  naloxone (NARCAN) 4 MG/0.1ML nasal spray  oxyCODONE-acetaminophen (PERCOCET) 5-325 MG tablet  SUMAtriptan (IMITREX) 50 MG tablet  tiZANidine (ZANAFLEX) 4 MG tablet  topiramate (TOPAMAX) 200 MG tablet        ALLERGIES:  Allergies   Allergen Reactions    Ondansetron Anxiety, Dizziness and Other (See Comments)       FAMILY HISTORY:  History reviewed. No pertinent family history.    SOCIAL HISTORY:   Social History     Socioeconomic History    Marital status: Single   Tobacco Use    Smoking status: Never     Passive exposure: Never    Smokeless tobacco: Never   Vaping Use    Vaping status: Never Used   Substance and Sexual Activity    Alcohol use: No     Comment: Alcoholic Drinks/day: occasional use    Drug use: No    Sexual activity: Yes     Partners: Male     Social Determinants of Health      Financial Resource Strain: Low Risk  (6/7/2024)    Received from [facility] [place] [facility] [employer] [facility]    Financial Resource Strain     Difficulty of Paying Living Expenses: 3   Food Insecurity: No Food Insecurity (6/7/2024)    Received from [facility] [employer] [facility]    Food Insecurity     Worried About Running Out of Food in the Last Year: 1   Transportation Needs: No Transportation Needs (6/7/2024)    Received from [facility] [employer] [facility]    Transportation Needs     Lack of Transportation (Medical): 1   Social Connections: Socially Integrated (6/7/2024)    Received from [facility] [employer] [facility]    Social Connections     Frequency of Communication with Friends and Family: 0   Housing Stability: Low Risk  (6/7/2024)    Received from [facility] [employer] [facility]    Housing Stability     Unable to Pay for Housing in the Last Year: 1       VITALS:  Patient Vitals for the past 24 hrs:   BP Temp Pulse Resp SpO2   08/03/24 1416 -- -- 98 -- --   08/03/24 1345 (!) 129/92 98.3  F (36.8  C) 107 20 99 %       PHYSICAL EXAM    GENERAL: Awake, alert.  In no acute distress.   HEENT: Normocephalic, atraumatic.  Pupils equal, round and reactive.  Conjunctiva normal.  EOMI.  NECK: No stridor or apparent deformity.  PULMONARY: Symmetrical breath sounds without distress.  Lungs clear to auscultation bilaterally without wheezes, rhonchi or rales.  CARDIO: Regular rate and rhythm.  No significant murmur, rub or gallop.  Radial pulses strong and symmetrical.  ABDOMINAL: Abdomen soft, non-distended and non-tender to palpation.  No CVAT, no palpable hepatosplenomegaly.  EXTREMITIES: No lower extremity swelling or edema.    NEURO: Alert and oriented to person, place and time.  Cranial nerves grossly intact.  No focal motor deficit.  PSYCH: Normal mood and affect  SKIN: No rashes      LAB:  All pertinent labs reviewed and interpreted.  Results for orders  placed or performed during the hospital encounter of 08/03/24   Result Value Ref Range    INR 1.06 0.85 - 1.15   Basic metabolic panel   Result Value Ref Range    Sodium 138 135 - 145 mmol/L    Potassium 4.0 3.4 - 5.3 mmol/L    Chloride 106 98 - 107 mmol/L    Carbon Dioxide (CO2) 22 22 - 29 mmol/L    Anion Gap 10 7 - 15 mmol/L    Urea Nitrogen 8.1 6.0 - 20.0 mg/dL    Creatinine 0.61 0.51 - 0.95 mg/dL    GFR Estimate >90 >60 mL/min/1.73m2    Calcium 8.9 8.8 - 10.4 mg/dL    Glucose 97 70 - 99 mg/dL   Result Value Ref Range    Troponin T, High Sensitivity <6 <=14 ng/L   Result Value Ref Range    Magnesium 1.7 1.7 - 2.3 mg/dL   TSH with free T4 reflex   Result Value Ref Range    TSH 0.50 0.30 - 4.20 uIU/mL   Ethyl Alcohol Level   Result Value Ref Range    Alcohol ethyl <0.01 <=0.01 g/dL   Nt probnp inpatient (BNP)   Result Value Ref Range    N terminal Pro BNP Inpatient 86 0 - 450 pg/mL   UA with Microscopic reflex to Culture    Specimen: Urine, Clean Catch   Result Value Ref Range    Color Urine Colorless Colorless, Straw, Light Yellow, Yellow    Appearance Urine Clear Clear    Glucose Urine Negative Negative mg/dL    Bilirubin Urine Negative Negative    Ketones Urine Negative Negative mg/dL    Specific Gravity Urine 1.004 1.001 - 1.030    Blood Urine Negative Negative    pH Urine 6.5 5.0 - 7.0    Protein Albumin Urine Negative Negative mg/dL    Urobilinogen Urine <2.0 <2.0 mg/dL    Nitrite Urine Negative Negative    Leukocyte Esterase Urine Negative Negative    Bacteria Urine Few (A) None Seen /HPF    RBC Urine <1 <=2 /HPF    WBC Urine <1 <=5 /HPF    Squamous Epithelials Urine 1 <=1 /HPF   Symptomatic Influenza A/B, RSV, & SARS-CoV2 PCR (COVID-19) Nasopharyngeal    Specimen: Nasopharyngeal; Swab   Result Value Ref Range    Influenza A PCR Negative Negative    Influenza B PCR Negative Negative    RSV PCR Negative Negative    SARS CoV2 PCR Negative Negative   D dimer quantitative   Result Value Ref Range    D-Dimer  Quantitative <0.27 0.00 - 0.50 ug/mL FEU   CBC with platelets and differential   Result Value Ref Range    WBC Count 9.7 4.0 - 11.0 10e3/uL    RBC Count 4.32 3.80 - 5.20 10e6/uL    Hemoglobin 13.0 11.7 - 15.7 g/dL    Hematocrit 38.4 35.0 - 47.0 %    MCV 89 78 - 100 fL    MCH 30.1 26.5 - 33.0 pg    MCHC 33.9 31.5 - 36.5 g/dL    RDW 12.4 10.0 - 15.0 %    Platelet Count 255 150 - 450 10e3/uL    % Neutrophils 77 %    % Lymphocytes 16 %    % Monocytes 7 %    % Eosinophils 0 %    % Basophils 1 %    % Immature Granulocytes 0 %    NRBCs per 100 WBC 0 <1 /100    Absolute Neutrophils 7.5 1.6 - 8.3 10e3/uL    Absolute Lymphocytes 1.5 0.8 - 5.3 10e3/uL    Absolute Monocytes 0.7 0.0 - 1.3 10e3/uL    Absolute Eosinophils 0.0 0.0 - 0.7 10e3/uL    Absolute Basophils 0.1 0.0 - 0.2 10e3/uL    Absolute Immature Granulocytes 0.0 <=0.4 10e3/uL    Absolute NRBCs 0.0 10e3/uL   Urine Drug Screen Panel   Result Value Ref Range    Amphetamines Urine Screen Negative Screen Negative    Barbituates Urine Screen Negative Screen Negative    Benzodiazepine Urine Screen Negative Screen Negative    Cannabinoids Urine Screen Negative Screen Negative    Cocaine Urine Screen Negative Screen Negative    Fentanyl Qual Urine Screen Negative Screen Negative    Opiates Urine Screen Negative Screen Negative    PCP Urine Screen Negative Screen Negative       RADIOLOGY:  Reviewed all pertinent imaging. Please see official radiology report.  XR Chest 2 Views    (Results Pending)   US OB < 14 Weeks Single    (Results Pending)         EKG:    Reviewed and interpreted as: Performed on 8/3/24 at 13:57:07. Vent rate: 96 BPM. Sinus rhythm. Normal ECG. No previous ECG's available for comparison.       I have independently reviewed and interpreted the EKG(s) documented above.        IJonathan, am serving as a scribe to document services personally performed by Dr. Maia Burnham based on my observation and the provider's statements to me. IMaia MD attest  that Jonathan Meyer is acting in a scribe capacity, has observed my performance of the services and has documented them in accordance with my direction.       Maia Burnham MD  08/03/24 0079

## 2024-08-03 NOTE — ED TRIAGE NOTES
Patient presents with chest pain described as heaviness rated 2/10, lightheadedness and shortness of breath. States these symptoms have been ongoing since the onset of pregnancy 12 weeks ago.    Patient has been having multiple fainting episodes and had one yesterday at 0800 and fell into the bed. Denies injuries from this.     Today her main concerns are shortness of breath that worsens with exertion and her blood pressure which was 150/110 at home. Patient did take labetalol at 1300

## 2024-08-12 LAB
ATRIAL RATE - MUSE: 96 BPM
DIASTOLIC BLOOD PRESSURE - MUSE: NORMAL MMHG
INTERPRETATION ECG - MUSE: NORMAL
P AXIS - MUSE: 71 DEGREES
PR INTERVAL - MUSE: 134 MS
QRS DURATION - MUSE: 78 MS
QT - MUSE: 378 MS
QTC - MUSE: 477 MS
R AXIS - MUSE: 78 DEGREES
SYSTOLIC BLOOD PRESSURE - MUSE: NORMAL MMHG
T AXIS - MUSE: 69 DEGREES
VENTRICULAR RATE- MUSE: 96 BPM

## 2024-10-08 ENCOUNTER — TRANSFERRED RECORDS (OUTPATIENT)
Dept: HEALTH INFORMATION MANAGEMENT | Facility: CLINIC | Age: 29
End: 2024-10-08

## 2024-10-09 ENCOUNTER — HOSPITAL ENCOUNTER (OUTPATIENT)
Facility: HOSPITAL | Age: 29
Discharge: HOME | End: 2024-10-09
Attending: OBSTETRICS & GYNECOLOGY | Admitting: OBSTETRICS & GYNECOLOGY
Payer: COMMERCIAL

## 2024-10-09 ENCOUNTER — HOSPITAL ENCOUNTER (OUTPATIENT)
Facility: HOSPITAL | Age: 29
End: 2024-10-09
Admitting: OBSTETRICS & GYNECOLOGY
Payer: COMMERCIAL

## 2024-10-09 VITALS
RESPIRATION RATE: 16 BRPM | WEIGHT: 169 LBS | SYSTOLIC BLOOD PRESSURE: 135 MMHG | TEMPERATURE: 98.2 F | BODY MASS INDEX: 27.16 KG/M2 | HEART RATE: 74 BPM | HEIGHT: 66 IN | DIASTOLIC BLOOD PRESSURE: 85 MMHG

## 2024-10-09 DIAGNOSIS — B96.89 BACTERIAL VAGINOSIS: Primary | ICD-10-CM

## 2024-10-09 DIAGNOSIS — N76.0 BACTERIAL VAGINOSIS: Primary | ICD-10-CM

## 2024-10-09 LAB
ALBUMIN UR-MCNC: NEGATIVE MG/DL
APPEARANCE UR: CLEAR
BILIRUB UR QL STRIP: NEGATIVE
CLUE CELLS: PRESENT
COLOR UR AUTO: COLORLESS
GLUCOSE UR STRIP-MCNC: NEGATIVE MG/DL
HGB UR QL STRIP: ABNORMAL
KETONES UR STRIP-MCNC: NEGATIVE MG/DL
LEUKOCYTE ESTERASE UR QL STRIP: NEGATIVE
NITRATE UR QL: NEGATIVE
PH UR STRIP: 7 [PH] (ref 5–7)
RBC URINE: <1 /HPF
SP GR UR STRIP: 1 (ref 1–1.03)
SQUAMOUS EPITHELIAL: 1 /HPF
TRICHOMONAS, WET PREP: ABNORMAL
UROBILINOGEN UR STRIP-MCNC: <2 MG/DL
WBC URINE: 1 /HPF
WBC'S/HIGH POWER FIELD, WET PREP: ABNORMAL
YEAST, WET PREP: ABNORMAL

## 2024-10-09 PROCEDURE — 87210 SMEAR WET MOUNT SALINE/INK: CPT | Performed by: OBSTETRICS & GYNECOLOGY

## 2024-10-09 PROCEDURE — 81001 URINALYSIS AUTO W/SCOPE: CPT | Performed by: OBSTETRICS & GYNECOLOGY

## 2024-10-09 PROCEDURE — G0463 HOSPITAL OUTPT CLINIC VISIT: HCPCS

## 2024-10-09 PROCEDURE — 250N000013 HC RX MED GY IP 250 OP 250 PS 637: Performed by: OBSTETRICS & GYNECOLOGY

## 2024-10-09 RX ORDER — NIFEDIPINE 20 MG/1
20 CAPSULE ORAL 2 TIMES DAILY
COMMUNITY

## 2024-10-09 RX ORDER — METRONIDAZOLE 500 MG/1
500 TABLET ORAL 2 TIMES DAILY
Qty: 13 TABLET | Refills: 0 | Status: SHIPPED | OUTPATIENT
Start: 2024-10-09

## 2024-10-09 RX ORDER — METRONIDAZOLE 500 MG/1
500 TABLET ORAL 2 TIMES DAILY
Status: DISCONTINUED | OUTPATIENT
Start: 2024-10-09 | End: 2024-10-10 | Stop reason: HOSPADM

## 2024-10-09 RX ORDER — LIDOCAINE 40 MG/G
CREAM TOPICAL
Status: DISCONTINUED | OUTPATIENT
Start: 2024-10-09 | End: 2024-10-10 | Stop reason: HOSPADM

## 2024-10-09 RX ADMIN — METRONIDAZOLE 500 MG: 500 TABLET ORAL at 23:31

## 2024-10-09 ASSESSMENT — ACTIVITIES OF DAILY LIVING (ADL)
ADLS_ACUITY_SCORE: 18
ADLS_ACUITY_SCORE: 18

## 2024-10-10 NOTE — PROGRESS NOTES
Writer received report from Ariel MORTON RN and assuming cares at this time.     SVE closed, thick and high per previous RN. Wet prep sent. Waiting for UA collection.

## 2024-10-10 NOTE — PROGRESS NOTES
Pt arrived to unit from ED c c/o vaginal spotting that started around 1930 and cramping x2 days and lower back pain. Pt denies ROM, difficulties c urination, no change to vaginal discharge other than bleeding noted, denies any fetal or placental issues. Pt states Hx of  birth r/t GHTN and states that has been discussed quite a bit this pregnancy.

## 2024-10-10 NOTE — PROVIDER NOTIFICATION
MD informed that Pt arrived, c/o spotting today, no intercourse, cramping x2 days and lower back pain. MD ordered SVE, UA and Wetprep

## 2024-10-10 NOTE — H&P
"[facility] LABOR & DELIVERY   HISTORY AND PHYSICAL TRIAGE EXAM      NAME:Shital Knutson  : 1995   MRN: [id number]   GESTATIONAL AGE: 21w1d    ADMISSION DATE: 10/9/2024     PCP:  Siobhan Carter     Pregnancy History: This is a 29-year-old -1-1-1 at 21 weeks and 1 day.  Patient is a patient of Dr. Ruddy Maxwell's.  Patient presents with report of vaginal spotting.  She states that she has never experienced that before in her pregnancy.  She denies any recent intercourse.  She denies any trauma.  She does report noticing a strong vaginal odor.  Additionally, she describes having some cramping that awoke her from sleep and some pressure in the suprapubic region.  She reports good fetal movement.  She recently had her fetal anatomy scan.  Pregnancy is otherwise complicated by chronic hypertension.  She is currently taking nifedipine 30 mg twice daily XL.    OBSTETRIC HISTORY:    OB History    Para Term  AB Living   3 1 0 1 1 1   SAB IAB Ectopic Multiple Live Births   0 0 0 0 1      # Outcome Date GA Lbr Michele/2nd Weight Sex Type Anes PTL Lv   3 Current            2  22 33w2d  1.69 kg (3 lb 11.6 oz) M CS-LTranv Spinal  BESS      Complications: Preeclampsia/Hypertension      Name: HARMEET KNUTSON-SHITAL      Apgar1: 8  Apgar5: 9   1 AB 19               EDC: Estimated Date of Delivery: 2025    EGA: 21w1d      Prenatal Complications   Patient Active Problem List   Diagnosis    Depression    Encounter for triage in pregnant patient    Elevated blood pressure affecting pregnancy in third trimester, antepartum    Pregnancy    Pre-eclampsia    Other migraine with status migrainosus, intractable    Opioid use disorder       Exam:      /85   Pulse 74   Temp 98.2  F (36.8  C) (Oral)   Resp 16   Ht 1.676 m (5' 6\")   Wt 76.7 kg (169 lb)   BMI 27.28 kg/m      Fetal heart Rate Tracing: Appropriate for gestational age  Contractions: Acontractile    HEENT  " negative  Heart       Lungs      Abdomen   Abdomen soft, non-tender. BS normal. No masses, organomegaly  Extremities  Normal, Warm, No cyanosis, no clubbing, No edema, and nontender    Wet Prep: Positive for clue cells    Assessment: 29-year-old -1-1-1 at 21 weeks and 1 day with vaginal bleeding secondary to bacterial vaginosis    Plan: Plan to discharge to home with Flagyl 500 mg twice daily x 7 days.  Follow-up in [facility] clinic as planned.    Prenatal record reviewed.    Donita Burton DO on 10/9/2024 at 11:11 PM

## 2024-10-16 NOTE — DISCHARGE INSTRUCTIONS
DISCHARGE PLANNING:  Screens:  ONBS: 11/1/Immunizations:  HepB Vaccine #1: Not given  2 Month Immunizations: 1/7  4 Month Immunizations: 3/14  6 Month Immunizations: 5/8  Influenza vaccine #1 given 10/2 , Dose #2 due ~11/2  Beyfortus: ####      HepA @ 12 months: ####  HIB booster @12-15 months: ####  Prevnar @12-18 months: ####  Discharge:  Carseat Challenge: ####  Safe Sleep: N/A currently + HOB elevated  Home PT: Yes and follow inpatient    Help-Me-Grow: Refer    Discharge Rx's: ####  WIC:   #### *will have  PMD: ####    Other Follow-Up: [facility], Ophtho, Neurosurgery, Ped Surg, Plastics, Urology, Genetics, [facility] (Pulm/GI/ENT/OT/ST)  Circumcision: #### will be with BIH repair  Education:  Intro to Trach: 8/19 completed, mom and dad  FLC Trach 1: 10/5 mom/dad/grandparent  FLC Trach 2: ####  FLC Trach 3: ####  FLC G-tube: ####  FLC  Shunt: ####  CPR: ####  DME/equipment: ####  Room-In: ####          Dietary Teaching: ####    Discharge Instruction for Undelivered Patients      You were seen for: Fetal Assessment  We Consulted: Dr. Garcia  You had (Test or Medicine):NST and HELLP Labs     Diet:   Drink 8 to 12 glasses of liquids (milk, juice, water) every day.  You may eat meals and snacks.        Call your provider if you notice:  Swelling in your face or increased swelling in your hands or legs.  Headaches that are not relieved by Tylenol (acetaminophen).  Changes in your vision (blurring: seeing spots or stars.)  Nausea (sick to your stomach) and vomiting (throwing up).   Weight gain of 5 pounds or more per week.  Heartburn that doesn't go away.  Signs of bladder infection: pain when you urinate (use the toilet), need to go more often and more urgently.  The bag of west (rupture of membranes) breaks, or you notice leaking in your underwear.  Bright red blood in your underwear.  Abdominal (lower belly) or stomach pain.  For first baby: Contractions (tightening) less than 5 minutes apart for one hour or more.  *If less than 34 weeks: Contractions (tightening) more than 6 times in one hour.  Increase or change in vaginal discharge (note the color and amount)      Follow-up:  Come back in the morning 6:30 am or sooner.  Call  with any changes, [phone].

## 2024-11-06 DIAGNOSIS — F11.20 OPIOID DEPENDENCE (H): Primary | ICD-10-CM

## 2024-12-06 ENCOUNTER — TRANSFERRED RECORDS (OUTPATIENT)
Dept: HEALTH INFORMATION MANAGEMENT | Facility: CLINIC | Age: 29
End: 2024-12-06

## 2024-12-07 ENCOUNTER — APPOINTMENT (OUTPATIENT)
Dept: ULTRASOUND IMAGING | Facility: HOSPITAL | Age: 29
End: 2024-12-07
Attending: OBSTETRICS & GYNECOLOGY
Payer: COMMERCIAL

## 2024-12-07 ENCOUNTER — HOSPITAL ENCOUNTER (INPATIENT)
Facility: HOSPITAL | Age: 29
LOS: 1 days | Discharge: HOME | End: 2024-12-08
Attending: OBSTETRICS & GYNECOLOGY | Admitting: OBSTETRICS & GYNECOLOGY
Payer: COMMERCIAL

## 2024-12-07 DIAGNOSIS — F41.9 ANXIETY: ICD-10-CM

## 2024-12-07 DIAGNOSIS — O10.919 CHRONIC HYPERTENSION AFFECTING PREGNANCY: Primary | ICD-10-CM

## 2024-12-07 PROBLEM — O14.90 PREECLAMPSIA: Status: ACTIVE | Noted: 2024-12-07

## 2024-12-07 LAB
ALBUMIN MFR UR ELPH: <6 MG/DL
ALBUMIN SERPL BCG-MCNC: 3.6 G/DL (ref 3.5–5.2)
ALP SERPL-CCNC: 80 U/L (ref 40–150)
ALT SERPL W P-5'-P-CCNC: 12 U/L (ref 0–50)
ALT SERPL W P-5'-P-CCNC: 12 U/L (ref 0–50)
ANION GAP SERPL CALCULATED.3IONS-SCNC: 11 MMOL/L (ref 7–15)
APTT PPP: 29 SECONDS (ref 22–38)
AST SERPL W P-5'-P-CCNC: 19 U/L (ref 0–45)
AST SERPL W P-5'-P-CCNC: 19 U/L (ref 0–45)
BASOPHILS # BLD AUTO: 0.1 10E3/UL (ref 0–0.2)
BASOPHILS NFR BLD AUTO: 1 %
BILIRUB SERPL-MCNC: 0.2 MG/DL
BUN SERPL-MCNC: 4.6 MG/DL (ref 6–20)
CALCIUM SERPL-MCNC: 8.5 MG/DL (ref 8.8–10.4)
CHLORIDE SERPL-SCNC: 104 MMOL/L (ref 98–107)
CREAT SERPL-MCNC: 0.61 MG/DL (ref 0.51–0.95)
CREAT SERPL-MCNC: 0.61 MG/DL (ref 0.51–0.95)
CREAT UR-MCNC: 8.2 MG/DL
EGFRCR SERPLBLD CKD-EPI 2021: >90 ML/MIN/1.73M2
EGFRCR SERPLBLD CKD-EPI 2021: >90 ML/MIN/1.73M2
EOSINOPHIL # BLD AUTO: 0.1 10E3/UL (ref 0–0.7)
EOSINOPHIL NFR BLD AUTO: 1 %
ERYTHROCYTE [DISTWIDTH] IN BLOOD BY AUTOMATED COUNT: 13.3 % (ref 10–15)
FIBRINOGEN PPP-MCNC: 359 MG/DL (ref 170–510)
GLUCOSE SERPL-MCNC: 102 MG/DL (ref 70–99)
HAPTOGLOB SERPL-MCNC: 102 MG/DL (ref 30–200)
HCO3 SERPL-SCNC: 23 MMOL/L (ref 22–29)
HCT VFR BLD AUTO: 32.2 % (ref 35–47)
HGB BLD-MCNC: 10.8 G/DL (ref 11.7–15.7)
HGB BLD-MCNC: 10.8 G/DL (ref 11.7–15.7)
HOLD SPECIMEN: NORMAL
IMM GRANULOCYTES # BLD: 0.1 10E3/UL
IMM GRANULOCYTES NFR BLD: 1 %
INR PPP: 1.01 (ref 0.85–1.15)
LDH SERPL L TO P-CCNC: 191 U/L (ref 0–250)
LYMPHOCYTES # BLD AUTO: 1.6 10E3/UL (ref 0.8–5.3)
LYMPHOCYTES NFR BLD AUTO: 16 %
MAGNESIUM SERPL-MCNC: 1.7 MG/DL (ref 1.7–2.3)
MCH RBC QN AUTO: 28.9 PG (ref 26.5–33)
MCHC RBC AUTO-ENTMCNC: 33.5 G/DL (ref 31.5–36.5)
MCV RBC AUTO: 86 FL (ref 78–100)
MONOCYTES # BLD AUTO: 0.7 10E3/UL (ref 0–1.3)
MONOCYTES NFR BLD AUTO: 8 %
NEUTROPHILS # BLD AUTO: 7.2 10E3/UL (ref 1.6–8.3)
NEUTROPHILS NFR BLD AUTO: 74 %
NRBC # BLD AUTO: 0 10E3/UL
NRBC BLD AUTO-RTO: 0 /100
PLATELET # BLD AUTO: 217 10E3/UL (ref 150–450)
PLATELET # BLD AUTO: 217 10E3/UL (ref 150–450)
POTASSIUM SERPL-SCNC: 3.6 MMOL/L (ref 3.4–5.3)
PROT SERPL-MCNC: 6.3 G/DL (ref 6.4–8.3)
PROT/CREAT 24H UR: NORMAL MG/G{CREAT}
RBC # BLD AUTO: 3.74 10E6/UL (ref 3.8–5.2)
SODIUM SERPL-SCNC: 138 MMOL/L (ref 135–145)
WBC # BLD AUTO: 9.8 10E3/UL (ref 4–11)

## 2024-12-07 PROCEDURE — 85384 FIBRINOGEN ACTIVITY: CPT | Performed by: OBSTETRICS & GYNECOLOGY

## 2024-12-07 PROCEDURE — 85018 HEMOGLOBIN: CPT | Performed by: OBSTETRICS & GYNECOLOGY

## 2024-12-07 PROCEDURE — 85610 PROTHROMBIN TIME: CPT | Performed by: OBSTETRICS & GYNECOLOGY

## 2024-12-07 PROCEDURE — 84156 ASSAY OF PROTEIN URINE: CPT | Performed by: OBSTETRICS & GYNECOLOGY

## 2024-12-07 PROCEDURE — 80053 COMPREHEN METABOLIC PANEL: CPT | Performed by: OBSTETRICS & GYNECOLOGY

## 2024-12-07 PROCEDURE — 36415 COLL VENOUS BLD VENIPUNCTURE: CPT | Performed by: OBSTETRICS & GYNECOLOGY

## 2024-12-07 PROCEDURE — 85025 COMPLETE CBC W/AUTO DIFF WBC: CPT | Performed by: OBSTETRICS & GYNECOLOGY

## 2024-12-07 PROCEDURE — 85730 THROMBOPLASTIN TIME PARTIAL: CPT | Performed by: OBSTETRICS & GYNECOLOGY

## 2024-12-07 PROCEDURE — 250N000013 HC RX MED GY IP 250 OP 250 PS 637: Performed by: OBSTETRICS & GYNECOLOGY

## 2024-12-07 PROCEDURE — 85049 AUTOMATED PLATELET COUNT: CPT | Performed by: OBSTETRICS & GYNECOLOGY

## 2024-12-07 PROCEDURE — 83615 LACTATE (LD) (LDH) ENZYME: CPT | Performed by: OBSTETRICS & GYNECOLOGY

## 2024-12-07 PROCEDURE — 83735 ASSAY OF MAGNESIUM: CPT | Performed by: OBSTETRICS & GYNECOLOGY

## 2024-12-07 PROCEDURE — 76815 OB US LIMITED FETUS(S): CPT

## 2024-12-07 PROCEDURE — 120N000001 HC R&B MED SURG/OB

## 2024-12-07 PROCEDURE — 250N000011 HC RX IP 250 OP 636: Performed by: OBSTETRICS & GYNECOLOGY

## 2024-12-07 PROCEDURE — 83010 ASSAY OF HAPTOGLOBIN QUANT: CPT | Performed by: OBSTETRICS & GYNECOLOGY

## 2024-12-07 PROCEDURE — 258N000003 HC RX IP 258 OP 636: Performed by: OBSTETRICS & GYNECOLOGY

## 2024-12-07 RX ORDER — METOCLOPRAMIDE HYDROCHLORIDE 5 MG/ML
10 INJECTION INTRAMUSCULAR; INTRAVENOUS EVERY 6 HOURS PRN
Status: DISCONTINUED | OUTPATIENT
Start: 2024-12-07 | End: 2024-12-08 | Stop reason: HOSPADM

## 2024-12-07 RX ORDER — MAGNESIUM SULFATE 4 G/50ML
4 INJECTION INTRAVENOUS
Status: DISCONTINUED | OUTPATIENT
Start: 2024-12-07 | End: 2024-12-08 | Stop reason: HOSPADM

## 2024-12-07 RX ORDER — LABETALOL HYDROCHLORIDE 5 MG/ML
20 INJECTION, SOLUTION INTRAVENOUS
Status: DISCONTINUED | OUTPATIENT
Start: 2024-12-07 | End: 2024-12-08 | Stop reason: HOSPADM

## 2024-12-07 RX ORDER — ESCITALOPRAM OXALATE 20 MG/1
20 TABLET ORAL DAILY
COMMUNITY
Start: 2024-11-23

## 2024-12-07 RX ORDER — NIFEDIPINE 30 MG
30 TABLET, EXTENDED RELEASE ORAL DAILY
Status: DISCONTINUED | OUTPATIENT
Start: 2024-12-07 | End: 2024-12-08

## 2024-12-07 RX ORDER — MAGNESIUM SULFATE 4 G/50ML
4 INJECTION INTRAVENOUS ONCE
Status: COMPLETED | OUTPATIENT
Start: 2024-12-07 | End: 2024-12-07

## 2024-12-07 RX ORDER — NIFEDIPINE 10 MG/1
10-20 CAPSULE ORAL
Status: DISCONTINUED | OUTPATIENT
Start: 2024-12-07 | End: 2024-12-08 | Stop reason: HOSPADM

## 2024-12-07 RX ORDER — METOCLOPRAMIDE 10 MG/1
10 TABLET ORAL EVERY 6 HOURS PRN
Status: DISCONTINUED | OUTPATIENT
Start: 2024-12-07 | End: 2024-12-08 | Stop reason: HOSPADM

## 2024-12-07 RX ORDER — ACETAMINOPHEN 325 MG/1
650 TABLET ORAL EVERY 4 HOURS PRN
Status: DISCONTINUED | OUTPATIENT
Start: 2024-12-07 | End: 2024-12-08 | Stop reason: HOSPADM

## 2024-12-07 RX ORDER — SODIUM PHOSPHATE,MONO-DIBASIC 19G-7G/118
1 ENEMA (ML) RECTAL DAILY PRN
Status: DISCONTINUED | OUTPATIENT
Start: 2024-12-07 | End: 2024-12-08 | Stop reason: HOSPADM

## 2024-12-07 RX ORDER — CALCIUM GLUCONATE 94 MG/ML
1 INJECTION, SOLUTION INTRAVENOUS
Status: DISCONTINUED | OUTPATIENT
Start: 2024-12-07 | End: 2024-12-08 | Stop reason: HOSPADM

## 2024-12-07 RX ORDER — SODIUM CHLORIDE, SODIUM LACTATE, POTASSIUM CHLORIDE, CALCIUM CHLORIDE 600; 310; 30; 20 MG/100ML; MG/100ML; MG/100ML; MG/100ML
10-125 INJECTION, SOLUTION INTRAVENOUS CONTINUOUS
Status: DISCONTINUED | OUTPATIENT
Start: 2024-12-07 | End: 2024-12-08 | Stop reason: HOSPADM

## 2024-12-07 RX ORDER — DOCUSATE SODIUM 100 MG/1
100 CAPSULE, LIQUID FILLED ORAL 2 TIMES DAILY
Status: DISCONTINUED | OUTPATIENT
Start: 2024-12-07 | End: 2024-12-08 | Stop reason: HOSPADM

## 2024-12-07 RX ORDER — SIMETHICONE 80 MG
160 TABLET,CHEWABLE ORAL EVERY 6 HOURS PRN
Status: DISCONTINUED | OUTPATIENT
Start: 2024-12-07 | End: 2024-12-08 | Stop reason: HOSPADM

## 2024-12-07 RX ORDER — NIFEDIPINE 30 MG/1
1 TABLET, EXTENDED RELEASE ORAL
Status: ON HOLD | COMMUNITY
Start: 2024-10-02 | End: 2024-12-08

## 2024-12-07 RX ORDER — BISACODYL 10 MG
10 SUPPOSITORY, RECTAL RECTAL DAILY PRN
Status: DISCONTINUED | OUTPATIENT
Start: 2024-12-07 | End: 2024-12-08 | Stop reason: HOSPADM

## 2024-12-07 RX ORDER — BETAMETHASONE SODIUM PHOSPHATE AND BETAMETHASONE ACETATE 3; 3 MG/ML; MG/ML
12 INJECTION, SUSPENSION INTRA-ARTICULAR; INTRALESIONAL; INTRAMUSCULAR; SOFT TISSUE EVERY 24 HOURS
Status: COMPLETED | OUTPATIENT
Start: 2024-12-07 | End: 2024-12-08

## 2024-12-07 RX ORDER — MAGNESIUM SULFATE HEPTAHYDRATE 40 MG/ML
2 INJECTION, SOLUTION INTRAVENOUS
Status: DISCONTINUED | OUTPATIENT
Start: 2024-12-07 | End: 2024-12-08 | Stop reason: HOSPADM

## 2024-12-07 RX ORDER — LIDOCAINE 40 MG/G
CREAM TOPICAL
Status: DISCONTINUED | OUTPATIENT
Start: 2024-12-07 | End: 2024-12-08 | Stop reason: HOSPADM

## 2024-12-07 RX ORDER — HYDROXYZINE HYDROCHLORIDE 50 MG/1
50 TABLET, FILM COATED ORAL
Status: DISCONTINUED | OUTPATIENT
Start: 2024-12-07 | End: 2024-12-08 | Stop reason: HOSPADM

## 2024-12-07 RX ORDER — ESCITALOPRAM OXALATE 10 MG/1
20 TABLET ORAL DAILY
Status: DISCONTINUED | OUTPATIENT
Start: 2024-12-08 | End: 2024-12-08 | Stop reason: HOSPADM

## 2024-12-07 RX ORDER — DIPHENHYDRAMINE HYDROCHLORIDE 50 MG/ML
25 INJECTION INTRAMUSCULAR; INTRAVENOUS EVERY 6 HOURS PRN
Status: DISCONTINUED | OUTPATIENT
Start: 2024-12-07 | End: 2024-12-08 | Stop reason: HOSPADM

## 2024-12-07 RX ORDER — HYDROXYZINE HYDROCHLORIDE 50 MG/1
50 TABLET, FILM COATED ORAL 4 TIMES DAILY PRN
Status: DISCONTINUED | OUTPATIENT
Start: 2024-12-07 | End: 2024-12-08 | Stop reason: HOSPADM

## 2024-12-07 RX ORDER — BUPRENORPHINE HYDROCHLORIDE AND NALOXONE HYDROCHLORIDE DIHYDRATE 8; 2 MG/1; MG/1
1 TABLET SUBLINGUAL 2 TIMES DAILY
Status: ON HOLD | COMMUNITY
End: 2024-12-08

## 2024-12-07 RX ORDER — PROCHLORPERAZINE MALEATE 10 MG
10 TABLET ORAL EVERY 6 HOURS PRN
Status: DISCONTINUED | OUTPATIENT
Start: 2024-12-07 | End: 2024-12-08 | Stop reason: HOSPADM

## 2024-12-07 RX ORDER — MAGNESIUM SULFATE IN WATER 40 MG/ML
1 INJECTION, SOLUTION INTRAVENOUS CONTINUOUS
Status: DISCONTINUED | OUTPATIENT
Start: 2024-12-07 | End: 2024-12-08

## 2024-12-07 RX ORDER — PRENATAL VIT/IRON FUM/FOLIC AC 27MG-0.8MG
1 TABLET ORAL DAILY
Status: DISCONTINUED | OUTPATIENT
Start: 2024-12-07 | End: 2024-12-08 | Stop reason: HOSPADM

## 2024-12-07 RX ORDER — DIPHENHYDRAMINE HCL 25 MG
25 CAPSULE ORAL EVERY 6 HOURS PRN
Status: DISCONTINUED | OUTPATIENT
Start: 2024-12-07 | End: 2024-12-08 | Stop reason: HOSPADM

## 2024-12-07 RX ADMIN — BETAMETHASONE SODIUM PHOSPHATE AND BETAMETHASONE ACETATE 12 MG: 3; 3 INJECTION, SUSPENSION INTRA-ARTICULAR; INTRALESIONAL; INTRAMUSCULAR at 18:01

## 2024-12-07 RX ADMIN — NIFEDIPINE 30 MG: 30 TABLET, EXTENDED RELEASE ORAL at 20:42

## 2024-12-07 RX ADMIN — MAGNESIUM SULFATE IN WATER 2 G/HR: 40 INJECTION, SOLUTION INTRAVENOUS at 18:53

## 2024-12-07 RX ADMIN — MAGNESIUM SULFATE HEPTAHYDRATE 4 G: 80 INJECTION, SOLUTION INTRAVENOUS at 18:24

## 2024-12-07 RX ADMIN — ACETAMINOPHEN 650 MG: 325 TABLET ORAL at 17:26

## 2024-12-07 RX ADMIN — NIFEDIPINE 10 MG: 10 CAPSULE ORAL at 17:15

## 2024-12-07 RX ADMIN — DOCUSATE SODIUM 100 MG: 100 CAPSULE, LIQUID FILLED ORAL at 20:42

## 2024-12-07 RX ADMIN — SODIUM CHLORIDE, POTASSIUM CHLORIDE, SODIUM LACTATE AND CALCIUM CHLORIDE 100 ML/HR: 600; 310; 30; 20 INJECTION, SOLUTION INTRAVENOUS at 18:23

## 2024-12-07 RX ADMIN — HYDROXYZINE HYDROCHLORIDE 50 MG: 50 TABLET, FILM COATED ORAL at 18:06

## 2024-12-07 ASSESSMENT — ACTIVITIES OF DAILY LIVING (ADL)
ADLS_ACUITY_SCORE: 18
ADLS_ACUITY_SCORE: 44
ADLS_ACUITY_SCORE: 18

## 2024-12-07 NOTE — PLAN OF CARE
Goal Outcome Evaluation:             Patient came into hospital to be evaluated for high bp and at home was getting 181/115 and 175/118.  I waiting at least 15 minutes after patient resting and received a severe range bp Dr. Zaman was called and I will start iv if 2nd bp is in severe range.

## 2024-12-07 NOTE — H&P
"OB HISTORY AND PHYSICAL UPDATE ADMISSION EXAM    Shital Tamayo  1995  [id number]    HPI: 28 yo  at 29+4 weeks admitted with elevated blood pressures. She has been seen in clinic since early pregnancy. She was started on Nifedipine at 15 weeks. Most recently, her blood pressures have been getting worse. Today she presents as she has a headache. She has not missed any doses of her blood pressure medication.     Estimated Date of Delivery: 2025                       EGA 29w4d    OB History    Para Term  AB Living   3 1 0 1 1 1   SAB IAB Ectopic Multiple Live Births   0 0 0 0 1      # Outcome Date GA Lbr Michele/2nd Weight Sex Type Anes PTL Lv   3 Current            2  22 33w2d  1.69 kg (3 lb 11.6 oz) M CS-LTranv Spinal  BESS      Complications: Preeclampsia/Hypertension      Name: ZENIA,MALE-SHITAL      Apgar1: 8  Apgar5: 9   1 AB 19               Prenatal Complications  Chronic HTN, on Nifedipine    Previous Pregnancy:  Zenia was admitted for pre-eclampsia with severe features remote from term. She was initiated on antihpyertensives, magnesium and received betamethasone. Intent was for in house observation and delivery at 34 weeks however due to progressing CNS symptoms she began induction at 33 weeks and was delivered via  section. IUGR was also present <1%. Fetal intolerance of labor.     Exam:    Temp 98.1  F (36.7  C) (Oral)   Resp 20   Ht 1.702 m (5' 7\")   BMI 26.47 kg/m          HEENT          WNL              Heart              WNL               Lungs             WNL                      Abdomen        WNL                       Extremities     WNL                     Fetal Status   Reassuring    Assessment:   IUP at 29+4 weeks  Chronic HTN, now with severe range blood pressures. Has been on Nifedipine since 15 weeks  Headache  Depression  H/O LST C/S     Plan:  Admit to Inpatient  Will treat Blood Pressures with IV medications for now  Start " Magnesium sulfate given headache  Betamethasone  NICU consult  Longterm plan -- Keep pregnant until 34 weeks if able. However, with worsening maternal or fetal status will need to move towards delivery, most likely C/S.  BPP 2/week, TID NST, growth every 3 weeks    Eva Zaman MD

## 2024-12-08 VITALS
DIASTOLIC BLOOD PRESSURE: 63 MMHG | OXYGEN SATURATION: 100 % | BODY MASS INDEX: 31.55 KG/M2 | RESPIRATION RATE: 18 BRPM | TEMPERATURE: 97.8 F | WEIGHT: 201 LBS | HEIGHT: 67 IN | HEART RATE: 82 BPM | SYSTOLIC BLOOD PRESSURE: 115 MMHG

## 2024-12-08 LAB
ALT SERPL W P-5'-P-CCNC: 10 U/L (ref 0–50)
AST SERPL W P-5'-P-CCNC: 17 U/L (ref 0–45)
CREAT SERPL-MCNC: 0.61 MG/DL (ref 0.51–0.95)
EGFRCR SERPLBLD CKD-EPI 2021: >90 ML/MIN/1.73M2
HGB BLD-MCNC: 10.3 G/DL (ref 11.7–15.7)
PLATELET # BLD AUTO: 221 10E3/UL (ref 150–450)

## 2024-12-08 PROCEDURE — 36415 COLL VENOUS BLD VENIPUNCTURE: CPT | Performed by: OBSTETRICS & GYNECOLOGY

## 2024-12-08 PROCEDURE — 85018 HEMOGLOBIN: CPT | Performed by: OBSTETRICS & GYNECOLOGY

## 2024-12-08 PROCEDURE — 84460 ALANINE AMINO (ALT) (SGPT): CPT | Performed by: OBSTETRICS & GYNECOLOGY

## 2024-12-08 PROCEDURE — 250N000013 HC RX MED GY IP 250 OP 250 PS 637: Performed by: OBSTETRICS & GYNECOLOGY

## 2024-12-08 PROCEDURE — 85049 AUTOMATED PLATELET COUNT: CPT | Performed by: OBSTETRICS & GYNECOLOGY

## 2024-12-08 PROCEDURE — 82565 ASSAY OF CREATININE: CPT | Performed by: OBSTETRICS & GYNECOLOGY

## 2024-12-08 PROCEDURE — 258N000003 HC RX IP 258 OP 636: Performed by: OBSTETRICS & GYNECOLOGY

## 2024-12-08 PROCEDURE — 250N000011 HC RX IP 250 OP 636: Performed by: OBSTETRICS & GYNECOLOGY

## 2024-12-08 PROCEDURE — 84450 TRANSFERASE (AST) (SGOT): CPT | Performed by: OBSTETRICS & GYNECOLOGY

## 2024-12-08 RX ORDER — SUMATRIPTAN 50 MG/1
50 TABLET, FILM COATED ORAL ONCE
Status: COMPLETED | OUTPATIENT
Start: 2024-12-08 | End: 2024-12-08

## 2024-12-08 RX ORDER — SUMATRIPTAN SUCCINATE 25 MG/1
25 TABLET ORAL ONCE
Status: COMPLETED | OUTPATIENT
Start: 2024-12-08 | End: 2024-12-08

## 2024-12-08 RX ORDER — NIFEDIPINE 30 MG
30 TABLET, EXTENDED RELEASE ORAL 2 TIMES DAILY
Status: SHIPPED
Start: 2024-12-08

## 2024-12-08 RX ORDER — NIFEDIPINE 30 MG
30 TABLET, EXTENDED RELEASE ORAL 2 TIMES DAILY
Status: DISCONTINUED | OUTPATIENT
Start: 2024-12-08 | End: 2024-12-08 | Stop reason: HOSPADM

## 2024-12-08 RX ADMIN — SODIUM CHLORIDE, POTASSIUM CHLORIDE, SODIUM LACTATE AND CALCIUM CHLORIDE 75 ML/HR: 600; 310; 30; 20 INJECTION, SOLUTION INTRAVENOUS at 06:56

## 2024-12-08 RX ADMIN — NIFEDIPINE 30 MG: 30 TABLET, EXTENDED RELEASE ORAL at 08:57

## 2024-12-08 RX ADMIN — BETAMETHASONE SODIUM PHOSPHATE AND BETAMETHASONE ACETATE 12 MG: 3; 3 INJECTION, SUSPENSION INTRA-ARTICULAR; INTRALESIONAL; INTRAMUSCULAR at 17:29

## 2024-12-08 RX ADMIN — ESCITALOPRAM OXALATE 20 MG: 10 TABLET ORAL at 08:57

## 2024-12-08 RX ADMIN — PRENATAL VIT W/ FE FUMARATE-FA TAB 27-0.8 MG 1 TABLET: 27-0.8 TAB at 08:56

## 2024-12-08 RX ADMIN — SUMATRIPTAN SUCCINATE 50 MG: 50 TABLET ORAL at 10:33

## 2024-12-08 RX ADMIN — DOCUSATE SODIUM 100 MG: 100 CAPSULE, LIQUID FILLED ORAL at 08:58

## 2024-12-08 RX ADMIN — SUMATRIPTAN SUCCINATE 25 MG: 25 TABLET ORAL at 09:18

## 2024-12-08 ASSESSMENT — ACTIVITIES OF DAILY LIVING (ADL)
ADLS_ACUITY_SCORE: 18

## 2024-12-08 NOTE — PROGRESS NOTES
Decreased Mag down to 1gm/hr, also gave a second dose of Imitrex (50mg).    FHT difficult to keep on will continue adjusting US.

## 2024-12-08 NOTE — DISCHARGE SUMMARY
[facility] Discharge Summary    Shital Tamayo MRN# [id number]   Age: 29 year old YOB: 1995     Date of Admission:  12/7/2024  Date of Discharge::  12/8/2024  Admitting Physician:  Eva Zaman MD  Discharge Physician:  Maru Gibbons MD    [facility]         Admission Diagnoses:   Chronic hypertension affecting pregnancy            Discharge Diagnosis:   Same, migraine            Procedures:   No procedures performed during this admission          Medications Prior to Admission:     Medications Prior to Admission   Medication Sig Dispense Refill Last Dose/Taking    escitalopram (LEXAPRO) 20 MG tablet Take 20 mg by mouth daily.   Taking    hydrOXYzine (ATARAX) 50 MG tablet Take 2 tablets (100 mg) by mouth 3 times daily as needed for anxiety (Patient taking differently: Take 100 mg by mouth 4 times daily as needed for anxiety.) 40 tablet 0 12/7/2024 Morning    acetaminophen (TYLENOL) 325 MG tablet Take 325-650 mg by mouth every 6 hours as needed for headaches       [DISCONTINUED] buprenorphine-naloxone (SUBOXONE) 8-2 MG SUBL sublingual tablet Place 1 tablet under the tongue 2 times daily.   Past Month    [DISCONTINUED] NIFEdipine ER OSMOTIC (PROCARDIA XL) 30 MG 24 hr tablet Take 1 tablet by mouth daily at 2 pm.                Discharge Medications:     Current Discharge Medication List        CONTINUE these medications which have CHANGED    Details   NIFEdipine ER (ADALAT CC) 30 MG 24 hr tablet Take 1 tablet (30 mg) by mouth 2 times daily.    Associated Diagnoses: Chronic hypertension affecting pregnancy           CONTINUE these medications which have NOT CHANGED    Details   escitalopram (LEXAPRO) 20 MG tablet Take 20 mg by mouth daily.      hydrOXYzine (ATARAX) 50 MG tablet Take 2 tablets (100 mg) by mouth 3 times daily as needed for anxiety  Qty: 40 tablet, Refills: 0    Associated Diagnoses: Anxiety      acetaminophen (TYLENOL) 325 MG tablet Take  325-650 mg by mouth every 6 hours as needed for headaches           STOP taking these medications       buprenorphine-naloxone (SUBOXONE) 8-2 MG SUBL sublingual tablet Comments:   Reason for Stopping:                    Hospital Course:   28 yo  presented at 29 weeks four days with elevated blood pressure, anxiety and headache.  Patient has history of chronic hypertension for which she switched from labetalol to nifedipine in this pregnancy.  She also has history of migraine.  Patient was admitted for BP surveillance, magnesium therapy and pre-eclampsia labs.  Labs and urine were within normal limits.  Headache improved with imitrex.  Most recent estimated fetal weight on  was 2 lbs 6 ounces and 18%ile.  Limited bedside ultrasound demonstrated transverse presentation, normal SALVADOR, grossly normal fetal movements.  Formal BPP not performed due to early gestational age.  Patient was counseled on concern for super-imposed pre-eclampsia on chronic hypertension given her CNS symptoms and discussed risks to her and pregnancy of stroke, permanent deficits, seizure, abruption, placental insufficiency and adverse fetal outcomes.  Patient was counseled to continue inpatient hospitalization with MFM consult and she expressed desire to be discharged to home.  Patient is off work, she lives with her mom who is an RN.  She accepts that if she has any return or change to her symptoms to return to the hospital.  She completed a course of betamethasone for pulmonary maturity.          Discharge Instructions and Follow-Up:     Discharge diet: Regular, limit salt and sodium and processed foods   Discharge activity: Activity as tolerated, do not over exert    Discharge follow-up: See Dr Maxwell this week               Discharge Disposition:     Discharged to home, patient has blood pressure cuff and is comfortable with surveillance      Maru Gibbons MD

## 2024-12-08 NOTE — PROGRESS NOTES
Pt has no complaints at this time. Requested to speak to the doctor.     This RN notified Dr. Gibbons she will see pt.

## 2024-12-08 NOTE — PROGRESS NOTES
Shtial is sleeping comfortably between cares. BP is now WNL following 30 mg nifedipine ER administration and continuous Magnesium Sulfate infusion. HTN assessments also WNL. Headache improved since admission with tylenol. EFM: previously moderate variability with 15x15 accelerations, now minimal variability without 15x15 accelerations, as expected with current IV infusion, and occasional insignificant variables present. Uterus palpates soft, patient denies feeling contractions/tightening, and toco is not tracing any contractions. Continuing with current POC of observation and hourly BP checks.

## 2024-12-08 NOTE — PROGRESS NOTES
[facility] Labor and Delivery Progress Note    Shital Tamayo MRN# [id number]   Age: 29 year old YOB: 1995           Subjective:   Patient is eating, states HA is worse this am, is prone to migraines which she says this feels similar, has not had one in a while.  Good fm.           Objective:   Patient Vitals for the past 24 hrs:   BP Temp Temp src Pulse Resp SpO2 Height Weight Oximeter Heart Rate   12/08/24 0918 -- -- -- -- -- 100 % -- -- 92 bpm   12/08/24 0913 -- -- -- -- -- 100 % -- -- 92 bpm   12/08/24 0908 -- -- -- -- -- 100 % -- -- 87 bpm   12/08/24 0835 127/82 -- Oral 86 18 -- -- -- 86 bpm   12/08/24 0730 111/68 97.5  F (36.4  C) Oral -- 18 97 % -- -- 86 bpm   12/08/24 0700 -- -- -- -- -- 96 % -- -- 81 bpm   12/08/24 0630 100/63 -- -- -- -- 93 % -- -- 84 bpm   12/08/24 0600 -- -- -- -- -- 95 % -- -- 79 bpm   12/08/24 0530 107/63 -- -- -- -- 97 % -- -- 81 bpm   12/08/24 0500 -- -- -- -- -- 94 % -- -- 73 bpm   12/08/24 0430 100/58 -- -- -- -- 93 % -- -- 73 bpm   12/08/24 0400 -- -- -- -- -- 93 % -- -- 77 bpm   12/08/24 0330 102/64 98.6  F (37  C) Oral -- 18 92 % -- -- 70 bpm   12/08/24 0300 -- -- -- -- -- 93 % -- -- 68 bpm   12/08/24 0230 103/58 -- -- -- -- 95 % -- -- 70 bpm   12/08/24 0200 -- -- -- -- -- 94 % -- -- 70 bpm   12/08/24 0130 93/55 -- -- -- -- 93 % -- -- 71 bpm   12/08/24 0100 -- -- -- -- -- 96 % -- -- 70 bpm   12/08/24 0030 96/52 -- -- -- -- 96 % -- -- 70 bpm   12/08/24 0000 -- -- -- -- -- 96 % -- -- 72 bpm   12/07/24 2330 100/59 98.6  F (37  C) Oral -- 20 95 % -- -- 76 bpm   12/07/24 2300 -- -- -- -- -- 93 % -- -- 81 bpm   12/07/24 2230 108/67 -- -- -- -- 98 % -- -- 86 bpm   12/07/24 2200 -- -- -- -- -- 98 % -- -- 96 bpm   12/07/24 2115 (!) 143/81 -- -- -- -- 99 % -- -- 77 bpm   12/07/24 2100 -- -- -- -- -- 98 % -- -- 89 bpm   12/07/24 2030 -- -- -- -- -- 99 % -- -- 102 bpm   12/07/24 2015 (!) 155/86 -- -- -- -- 100 % -- -- 91 bpm   12/07/24 2000 -- -- -- -- -- 100 % -- --  "100 bpm   24 (!) 157/89 98.2  F (36.8  C) Oral -- 22 100 % -- -- 93 bpm   24 -- -- -- -- -- -- -- 91.2 kg (201 lb) --   24 (!) 151/91 -- -- -- -- -- -- -- 86 bpm   24 190 (!) 149/83 -- -- -- -- 100 % -- -- 96 bpm   24 1617 -- 98.1  F (36.7  C) Oral -- 20 -- 1.702 m (5' 7\") -- --   CV regular  Resp non labored  Ab gravid    Fetal Heart Rate:    Monitor: external US    Variability: moderate (amplitude range 6 to 25 bpm)    Baseline Rate: normal range    Fetal Heart Rate Tracing: appropriate for gestational age    Labs WNL  Urine protein neg  Limites usn breech, anterior placenta, growth not completed          Assessment:   Shital Tamayo is a 29 year old  who is 29w5d here with chronic HTN, HA, acute on chronic elevation to BP, concern for super-imposed pre-E based on CNS symptoms, labs otherwise normal, received first dose of betamethasone last night, on magnesium          Plan:   Trial of imitrex for HA, decrease mag to one gram per hour, continue po nifedipine and follow Bps, plan for MFM consult and will need interval growth usn.      Maru Gibbons MD      "

## 2024-12-08 NOTE — PLAN OF CARE
"  Problem: Adult Inpatient Plan of Care  Goal: Plan of Care Review  Description: The Plan of Care Review/Shift note should be completed every shift.  The Outcome Evaluation is a brief statement about your assessment that the patient is improving, declining, or no change.  This information will be displayed automatically on your shift  note.  Outcome: Progressing  Flowsheets (Taken 12/8/2024 1447)  Outcome Evaluation: BP WNL no s/s of Pre E  Plan of Care Reviewed With: patient  Overall Patient Progress: improving  Goal: Patient-Specific Goal (Individualized)  Description: You can add care plan individualizations to a care plan. Examples of Individualization might be:  \"Parent requests to be called daily at 9am for status\", \"I have a hard time hearing out of my right ear\", or \"Do not touch me to wake me up as it startles  me\".  Outcome: Progressing  Flowsheets (Taken 12/8/2024 1447)  Individualized Care Needs: Wants a visit from her baby.  Anxieties, Fears or Concerns: [place]  Patient/Family-Specific Goals (Include Timeframe): Wants to go home this evening. 12/8/24  Goal: Absence of Hospital-Acquired Illness or Injury  Outcome: Progressing  Intervention: Identify and Manage Fall Risk  Recent Flowsheet Documentation  Taken 12/8/2024 0835 by Geni Whitaker RN  Safety Promotion/Fall Prevention:   activity supervised   clutter free environment maintained   nonskid shoes/slippers when out of bed  Intervention: Prevent Skin Injury  Recent Flowsheet Documentation  Taken 12/8/2024 0835 by Geni Whitaker, RN  Body Position: position changed independently  Intervention: Prevent and Manage VTE (Venous Thromboembolism) Risk  Recent Flowsheet Documentation  Taken 12/8/2024 1415 by Geni Whitaker, RN  VTE Prevention/Management: SCDs off (sequential compression devices)  Taken 12/8/2024 0835 by Geni Whitaker, RN  VTE Prevention/Management: SCDs on (sequential compression devices)  Goal: Optimal Comfort and Wellbeing  Outcome: " Progressing  Intervention: Monitor Pain and Promote Comfort  Recent Flowsheet Documentation  Taken 12/8/2024 0918 by Geni Whitaker, RN  Pain Management Interventions: medication (see MAR)  Taken 12/8/2024 0835 by Geni Whitaker, RN  Pain Management Interventions: (imitrex ordered) MD notified (comment)  Intervention: Provide Person-Centered Care  Recent Flowsheet Documentation  Taken 12/8/2024 0835 by Geni Whitaker, RN  Trust Relationship/Rapport:   care explained   choices provided   emotional support provided   questions answered   empathic listening provided   reassurance provided   questions encouraged   thoughts/feelings acknowledged  Goal: Readiness for Transition of Care  Outcome: Progressing   Goal Outcome Evaluation:      Plan of Care Reviewed With: patient    Overall Patient Progress: improvingOverall Patient Progress: improving    Outcome Evaluation: BP WNL no s/s of Pre E

## 2024-12-08 NOTE — PROGRESS NOTES
Data: Patient assessed in the [facility] for  elevated blood pressure . Cervical exam deferred. Membranes intact. Contractions are  , none minutes apart, and last 60 seconds. Uterine assessment is no contractions during contractions and soft by palpation at rest. See flowsheets for fetal assessment documentation.     Action:  Presumed adequate fetal oxygenation documented. Early labor precautions and discharge instructions reviewed, including when to notify provider if warning signs present. Patient instructed to report decrease in fetal movement, vaginal bleeding, changes in membrane status, abdominal pain, or any concerns related to the pregnancy to patient's provider/clinic.     Response: Orders to discharge home per Dr Gibbons. Plan for patient is to re-evaluate labor status by following up with provider as scheduled. Patient verbalized understanding of education and agreement with plan. Discharged to home at 1742.

## 2024-12-08 NOTE — DISCHARGE INSTRUCTIONS
EARLY LABOR DISCHARGE INSTRUCTIONS    You were seen for: Labor Assessment  You had (Test or Medicine): EFM, magnesium, blood pressure checks.     Refer to Any Day Now handout for tips on how to labor at home    WHEN TO CALL YOUR PROVIDER:  If this is your first baby: Your contractions (tightening) are 5 minutes apart, last more than 1 minute, and have been consistently getting stronger for 1 hour or more  If this is your second baby or beyond: Your contractions are less than 10 minutes apart and have been consistently getting stronger for 1 hour or more  Feeling your baby move less than usual  Temperature of 100.4 F (38 C) or higher  New fluid leaking from your vagina  Other signs your provider asked you to look for in your body  Vaginal bleeding (bright red blood)  Swelling in your face or more swelling in your hands or legs  Headaches that don't get better after taking Tylenol (acetaminophen)  Changes in your vision (blurry or seeing spots or stars)  Nausea (sick to your stomach) and vomiting (throwing up)  Heartburn that doesn't go away  Sudden, bad belly pain that is unlike your contractions    IF YOU ARRIVED WITH YOUR WATER ALREADY BROKEN (MEMBRANES RUPTURED):  Avoid placing anything in vagina, including intercourse (sex)  Check your temperature every 3 hours when awake  Call your provider/clinic if:  Your temperature is 100.4 F (38 C) or higher  Your fluid becomes not clear or is smelly  Your baby is moving less than usual  You don't go into labor within 24 hours of your water breaking  You have other concerns  Follow up plan: as scheduled      FOLLOW UP:  As scheduled in the clinic    Provider/clinic number:   Counting Your Baby's Kicks: Care Instructions  Overview     Counting your baby's kicks is one way your doctor can tell that your baby is healthy. You will probably feel your baby move for the first time between 16 and 22 weeks. The movement may feel like flutters rather than kicks. Your baby may move  "more at certain times of the day. When you are active, you may notice less kicking than when you are resting. At your prenatal visits, your doctor will ask whether the baby is active.  In your last trimester, your doctor may ask you to count the number of times you feel your baby move.  Follow-up care is a key part of your treatment and safety. Be sure to make and go to all appointments, and call your doctor if you are having problems. It's also a good idea to know your test results and keep a list of the medicines you take.  How do you count fetal kicks?  A common method of checking your baby's movement is to note the length of time it takes to count 10 movements (such as kicks, flutters, or rolls).  Pick your baby's most active time of day to count. This may be any time from morning to evening.  If you don't feel 10 movements in an hour, have something to eat or drink and count for another hour. If you don't feel at least 10 movements in the 2-hour period, call your doctor.  Do not use an at-home Doppler heart monitor in place of counting fetal movements.  When should you call for help?   Call your doctor now or seek immediate medical care if:    You feel fewer than 10 movements in a 2-hour period.     You noticed that your baby has stopped moving or is moving less than normal.   Watch closely for changes in your health, and be sure to contact your doctor if you have any problems.  Where can you learn more?  Go to https://www.[employer].net/patiented  Enter U048 in the search box to learn more about \"Counting Your Baby's Kicks: Care Instructions.\"  Current as of: July 10, 2023  Content Version: 14.2 2024 [facility] [employer].   Care instructions adapted under license by your healthcare professional. If you have questions about a medical condition or this instruction, always ask your healthcare professional. [employer] disclaims any warranty or liability for your use of this information.      "

## 2024-12-08 NOTE — PROVIDER NOTIFICATION
RN spoke to Dr. Zaman in department regarding maternal vital signs and EFM. Notified of SpO2 <95%, attempted intervention, and patient response. Updated on BP trends overnight; plan to hold next scheduled nifedipine ER 30 mg dose. Lastly, notified of minimal variability overnight, consistent with continuous magnesium infusion.

## 2024-12-08 NOTE — PROGRESS NOTES
In House OB    Feeling much better  NO concerns  Category I  Magnesium on  Will restart her Nifedipine    Eva Zaman MD

## 2024-12-08 NOTE — PLAN OF CARE
Goal Outcome Evaluation:      Plan of Care Reviewed With: patient    Overall Patient Progress: improvingOverall Patient Progress: improving    Outcome Evaluation: BP improved from elevated to normotensive this shift with nifeipine ER administration and Mag infusion.      Problem: Hypertensive Disorders in Pregnancy  Goal: Patient-Fetal Stabilization  Outcome: Progressing     Problem: Hospitalized  Patient  Goal: Optimal Patient-Fetal Wellbeing  2024 0446 by Myrna Bhatia, RN  Outcome: Progressing  Intervention: Support Psychosocial Response  Recent Flowsheet Documentation  Taken 2024 194 by Myrna Bhatia, RN  Family/Support System Care: support provided

## 2024-12-08 NOTE — PROGRESS NOTES
Pt is very uncomfortable this morning with a headache. Gave 25 mg of Imitrex at 0918, it daly't worked Dr Gibbons ordered a second dose of  50 mg. Vss, afebrile, no cramping nor ctns. Assessment WNL except for headache. Orders received to turn Mag down to 1ml/hr.

## 2024-12-08 NOTE — PROGRESS NOTES
"Patient's pulse oximetry tracing SpO2 <95% consistently for 50 minutes through sticker on left pinky. New pulse oximeter placed on left ring finger and continued to trace <95%. RN placed pulse oximeter clip on other left hand fingers and right index finger, but continued to trace <95%. RN explained to patient that supplemental oxygen is indicated and reasoning for intervention. Patient declined supplemental oxygen stating, \"Can we wait? I think I have sleep apnea.\" Continuous pulse oximetry to remain in place per orders and plan to reevaluate when patient is awake.  "

## 2024-12-09 ENCOUNTER — PATIENT OUTREACH (OUTPATIENT)
Dept: CARE COORDINATION | Facility: CLINIC | Age: 29
End: 2024-12-09
Payer: COMMERCIAL

## 2024-12-09 NOTE — PROGRESS NOTES
Clinic Care Coordination Contact  Transitions of Care Outreach    Chief Complaint   Patient presents with    Clinic Care Coordination - Post Hospital       Most Recent Admission Date: 12/7/2024   Most Recent Admission Diagnosis:      Most Recent Discharge Date: 12/8/2024   Most Recent Discharge Diagnosis: Anxiety - F41.9  Chronic hypertension affecting pregnancy - O10.919     Transitions of Care Assessment    Discharge Assessment  How are you doing now that you are home?: feeling better  How are your symptoms? (Red Flag symptoms escalate to triage hotline per guidelines): Improved  Do you know how to contact your clinic care team if you have future questions or changes to your health status? : Yes  Does the patient have their discharge instructions? : Yes  Does the patient have questions regarding their discharge instructions? : No  Were you started on any new medications or were there changes to any of your previous medications? : Yes  Does the patient have all of their medications?: Yes  Do you have questions regarding any of your medications? : No  Do you have all of your needed medical supplies or equipment (DME)?  (i.e. oxygen tank, CPAP, cane, etc.): Yes    Post-op (LAKISHA CTA Only)  If the patient had a surgery or procedure, do they have any questions for a nurse?: No         Follow up Plan     No future appointments.  Outpatient Plan as outlined on AVS reviewed with patient.      For any urgent concerns, please contact our 24 hour nurse triage line: [phone]     LAKISHA Pozo  [phone]  [facility]

## 2024-12-15 ENCOUNTER — HOSPITAL ENCOUNTER (OUTPATIENT)
Facility: HOSPITAL | Age: 29
Discharge: HOME | End: 2024-12-17
Attending: OBSTETRICS & GYNECOLOGY | Admitting: OBSTETRICS & GYNECOLOGY
Payer: COMMERCIAL

## 2024-12-15 DIAGNOSIS — O10.919 CHRONIC HYPERTENSION AFFECTING PREGNANCY: Primary | ICD-10-CM

## 2024-12-15 LAB
ALBUMIN MFR UR ELPH: 6.3 MG/DL
ALBUMIN SERPL BCG-MCNC: 3.6 G/DL (ref 3.5–5.2)
ALP SERPL-CCNC: 81 U/L (ref 40–150)
ALT SERPL W P-5'-P-CCNC: 11 U/L (ref 0–50)
ANION GAP SERPL CALCULATED.3IONS-SCNC: 10 MMOL/L (ref 7–15)
AST SERPL W P-5'-P-CCNC: 15 U/L (ref 0–45)
BILIRUB SERPL-MCNC: 0.2 MG/DL
BUN SERPL-MCNC: 7.6 MG/DL (ref 6–20)
CALCIUM SERPL-MCNC: 8.6 MG/DL (ref 8.8–10.4)
CHLORIDE SERPL-SCNC: 103 MMOL/L (ref 98–107)
CREAT SERPL-MCNC: 0.55 MG/DL (ref 0.51–0.95)
CREAT UR-MCNC: 51.2 MG/DL
EGFRCR SERPLBLD CKD-EPI 2021: >90 ML/MIN/1.73M2
ERYTHROCYTE [DISTWIDTH] IN BLOOD BY AUTOMATED COUNT: 13.4 % (ref 10–15)
GLUCOSE SERPL-MCNC: 96 MG/DL (ref 70–99)
HCO3 SERPL-SCNC: 23 MMOL/L (ref 22–29)
HCT VFR BLD AUTO: 32.3 % (ref 35–47)
HGB BLD-MCNC: 10.9 G/DL (ref 11.7–15.7)
MCH RBC QN AUTO: 29.2 PG (ref 26.5–33)
MCHC RBC AUTO-ENTMCNC: 33.7 G/DL (ref 31.5–36.5)
MCV RBC AUTO: 87 FL (ref 78–100)
PLATELET # BLD AUTO: 269 10E3/UL (ref 150–450)
POTASSIUM SERPL-SCNC: 3.5 MMOL/L (ref 3.4–5.3)
PROT SERPL-MCNC: 6.2 G/DL (ref 6.4–8.3)
PROT/CREAT 24H UR: 0.12 MG/MG CR (ref 0–0.2)
RBC # BLD AUTO: 3.73 10E6/UL (ref 3.8–5.2)
SODIUM SERPL-SCNC: 136 MMOL/L (ref 135–145)
WBC # BLD AUTO: 11.3 10E3/UL (ref 4–11)

## 2024-12-15 PROCEDURE — 85048 AUTOMATED LEUKOCYTE COUNT: CPT | Performed by: OBSTETRICS & GYNECOLOGY

## 2024-12-15 PROCEDURE — 80053 COMPREHEN METABOLIC PANEL: CPT | Performed by: OBSTETRICS & GYNECOLOGY

## 2024-12-15 PROCEDURE — 82374 ASSAY BLOOD CARBON DIOXIDE: CPT | Performed by: OBSTETRICS & GYNECOLOGY

## 2024-12-15 PROCEDURE — 85014 HEMATOCRIT: CPT | Performed by: OBSTETRICS & GYNECOLOGY

## 2024-12-15 PROCEDURE — 250N000011 HC RX IP 250 OP 636: Performed by: OBSTETRICS & GYNECOLOGY

## 2024-12-15 PROCEDURE — 250N000013 HC RX MED GY IP 250 OP 250 PS 637: Performed by: OBSTETRICS & GYNECOLOGY

## 2024-12-15 PROCEDURE — 84156 ASSAY OF PROTEIN URINE: CPT | Performed by: OBSTETRICS & GYNECOLOGY

## 2024-12-15 PROCEDURE — 82040 ASSAY OF SERUM ALBUMIN: CPT | Performed by: OBSTETRICS & GYNECOLOGY

## 2024-12-15 PROCEDURE — 36415 COLL VENOUS BLD VENIPUNCTURE: CPT | Performed by: OBSTETRICS & GYNECOLOGY

## 2024-12-15 RX ORDER — OXYCODONE HYDROCHLORIDE 5 MG/1
5 TABLET ORAL
Status: COMPLETED | OUTPATIENT
Start: 2024-12-15 | End: 2024-12-15

## 2024-12-15 RX ORDER — NIFEDIPINE 30 MG
30 TABLET, EXTENDED RELEASE ORAL DAILY
Status: DISCONTINUED | OUTPATIENT
Start: 2024-12-15 | End: 2024-12-17 | Stop reason: HOSPADM

## 2024-12-15 RX ORDER — BUTALBITAL, ACETAMINOPHEN AND CAFFEINE 50; 325; 40 MG/1; MG/1; MG/1
1 TABLET ORAL
Status: COMPLETED | OUTPATIENT
Start: 2024-12-15 | End: 2024-12-15

## 2024-12-15 RX ORDER — LIDOCAINE 40 MG/G
CREAM TOPICAL
Status: DISCONTINUED | OUTPATIENT
Start: 2024-12-15 | End: 2024-12-17 | Stop reason: HOSPADM

## 2024-12-15 RX ORDER — SODIUM CHLORIDE, SODIUM LACTATE, POTASSIUM CHLORIDE, CALCIUM CHLORIDE 600; 310; 30; 20 MG/100ML; MG/100ML; MG/100ML; MG/100ML
10-125 INJECTION, SOLUTION INTRAVENOUS CONTINUOUS
Status: DISCONTINUED | OUTPATIENT
Start: 2024-12-15 | End: 2024-12-17 | Stop reason: HOSPADM

## 2024-12-15 RX ORDER — LABETALOL HYDROCHLORIDE 5 MG/ML
20-80 INJECTION, SOLUTION INTRAVENOUS EVERY 10 MIN PRN
Status: DISCONTINUED | OUTPATIENT
Start: 2024-12-15 | End: 2024-12-17 | Stop reason: HOSPADM

## 2024-12-15 RX ORDER — LABETALOL HYDROCHLORIDE 5 MG/ML
INJECTION, SOLUTION INTRAVENOUS
Status: COMPLETED
Start: 2024-12-15 | End: 2024-12-15

## 2024-12-15 RX ORDER — HYDRALAZINE HYDROCHLORIDE 20 MG/ML
10 INJECTION INTRAMUSCULAR; INTRAVENOUS
Status: DISCONTINUED | OUTPATIENT
Start: 2024-12-15 | End: 2024-12-17 | Stop reason: HOSPADM

## 2024-12-15 RX ADMIN — LABETALOL HYDROCHLORIDE 20 MG: 5 INJECTION, SOLUTION INTRAVENOUS at 19:09

## 2024-12-15 RX ADMIN — NIFEDIPINE 30 MG: 30 TABLET, EXTENDED RELEASE ORAL at 18:43

## 2024-12-15 RX ADMIN — BUTALBITAL, ACETAMINOPHEN, AND CAFFEINE 1 TABLET: 50; 325; 40 TABLET ORAL at 21:40

## 2024-12-15 RX ADMIN — OXYCODONE HYDROCHLORIDE 5 MG: 5 TABLET ORAL at 23:32

## 2024-12-15 ASSESSMENT — ACTIVITIES OF DAILY LIVING (ADL)
ADLS_ACUITY_SCORE: 18

## 2024-12-15 NOTE — PROGRESS NOTES
Data: Shital Tamayo a 29 year old  30w5d presented to [facility]: 12/15/2024  5:35 PM.  Reason for maternal/fetal assessment is HA and a high b/p reading at home of 184/120. Patient reports headache. VSS. Fetal movement present. Patient denies uterine contractions, leaking of vaginal fluid/rupture of membranes, vaginal bleeding, abdominal pain, pelvic pressure, nausea, vomiting, visual disturbances, epigastric or RUQ pain, significant edema. Support person is present.   Action: Verbal consent for EFM and monitors placed upon arrival. Triage assessment completed. Bill of rights reviewed.  Response: EFM applied. MD Rios paged, awaiting call back.   Lance Menard RN  12/15/2024 5:58 PM

## 2024-12-16 LAB
ALT SERPL W P-5'-P-CCNC: 11 U/L (ref 0–50)
AST SERPL W P-5'-P-CCNC: 14 U/L (ref 0–45)
CREAT SERPL-MCNC: 0.53 MG/DL (ref 0.51–0.95)
EGFRCR SERPLBLD CKD-EPI 2021: >90 ML/MIN/1.73M2
HGB BLD-MCNC: 11.3 G/DL (ref 11.7–15.7)
PLATELET # BLD AUTO: 257 10E3/UL (ref 150–450)

## 2024-12-16 PROCEDURE — 85049 AUTOMATED PLATELET COUNT: CPT | Performed by: OBSTETRICS & GYNECOLOGY

## 2024-12-16 PROCEDURE — 250N000013 HC RX MED GY IP 250 OP 250 PS 637: Performed by: OBSTETRICS & GYNECOLOGY

## 2024-12-16 PROCEDURE — 84450 TRANSFERASE (AST) (SGOT): CPT | Performed by: OBSTETRICS & GYNECOLOGY

## 2024-12-16 PROCEDURE — 250N000011 HC RX IP 250 OP 636: Performed by: OBSTETRICS & GYNECOLOGY

## 2024-12-16 PROCEDURE — 84460 ALANINE AMINO (ALT) (SGPT): CPT | Performed by: OBSTETRICS & GYNECOLOGY

## 2024-12-16 PROCEDURE — 82565 ASSAY OF CREATININE: CPT | Performed by: OBSTETRICS & GYNECOLOGY

## 2024-12-16 PROCEDURE — 85018 HEMOGLOBIN: CPT | Performed by: OBSTETRICS & GYNECOLOGY

## 2024-12-16 PROCEDURE — 36415 COLL VENOUS BLD VENIPUNCTURE: CPT | Performed by: OBSTETRICS & GYNECOLOGY

## 2024-12-16 RX ORDER — ACETAMINOPHEN 325 MG/1
975 TABLET ORAL EVERY 6 HOURS PRN
Status: DISCONTINUED | OUTPATIENT
Start: 2024-12-16 | End: 2024-12-17 | Stop reason: HOSPADM

## 2024-12-16 RX ORDER — DEXTROAMPHETAMINE SULFATE, DEXTROAMPHETAMINE SACCHARATE, AMPHETAMINE SULFATE AND AMPHETAMINE ASPARTATE 7.5; 7.5; 7.5; 7.5 MG/1; MG/1; MG/1; MG/1
30 CAPSULE, EXTENDED RELEASE ORAL DAILY
COMMUNITY
Start: 2024-12-12

## 2024-12-16 RX ORDER — METOCLOPRAMIDE HYDROCHLORIDE 5 MG/ML
10 INJECTION INTRAMUSCULAR; INTRAVENOUS EVERY 6 HOURS PRN
Status: DISCONTINUED | OUTPATIENT
Start: 2024-12-16 | End: 2024-12-17 | Stop reason: HOSPADM

## 2024-12-16 RX ADMIN — ACETAMINOPHEN 975 MG: 325 TABLET ORAL at 17:44

## 2024-12-16 RX ADMIN — NIFEDIPINE 30 MG: 30 TABLET, EXTENDED RELEASE ORAL at 08:32

## 2024-12-16 RX ADMIN — METOCLOPRAMIDE 10 MG: 5 INJECTION, SOLUTION INTRAMUSCULAR; INTRAVENOUS at 01:04

## 2024-12-16 ASSESSMENT — ACTIVITIES OF DAILY LIVING (ADL)
ADLS_ACUITY_SCORE: 18

## 2024-12-16 NOTE — PLAN OF CARE
Goal Outcome Evaluation:      Plan of Care Reviewed With: patient    Overall Patient Progress: improvingOverall Patient Progress: improving    Outcome Evaluation: Patient was able to rest tonight. Per patient, headache is better now. Blood pressure WNL- no treatment needed tonight. Patient to call nurse as needed.

## 2024-12-16 NOTE — PROGRESS NOTES
MD Rios aware of the follow up b/p reading of giving PO Nifedipine of 165/103. Per MD Rios orders placed for IV labetalol given at 1909. Labs ordered. Labs ordered for daily. Unable to get pro/creat urine sample, voided earlier.     Lance Menard RN

## 2024-12-16 NOTE — PROGRESS NOTES
PO nifedipine 30 mg given at 1843 per MD Rios, will recheck b/p in 20 minutes. PIV placed at 1844 by swat nurse.     Lance Menard RN

## 2024-12-16 NOTE — PLAN OF CARE
Goal Outcome Evaluation:      Plan of Care Reviewed With: patient    Overall Patient Progress: improvingOverall Patient Progress: improving    Outcome Evaluation: Patient recieved one dose of Labetalol 20mg at 1909 for blood pressure. Blood pressure checks per protocol. Blood pressure not in severe range. Patient moved to room 10 for overnight observation.      Patient requesting for pain medication for her headache. Rating headache 6/10. Per Dr. Rios, new order for one time dose of Foricet. Patient in agreement.

## 2024-12-16 NOTE — H&P
[facility]    History and Physical       Date of Admission:  12/15/2024    History of Present Illness   Shital Tamayo is a 29 year old female  30w 5d  Estimated Date of Delivery: 2025 presents to L+D with elevated blood pressure.     She was recently hospitalized from  to . Betamethasone was given on  and .    OB COMPLICATIONS:  CHTN on Meds - Nifedipine 30 mg BID  Anxiety/Depression - Escitalopram 20 mg every day  Hx of pre eclampsia with severe features 33w 2d   Hx of  birth 2/2 pre eclampsia  Hx of fetal growth restriction    Past Medical History    Past Medical History:   Diagnosis Date    Anxiety     Depressive disorder        Past Surgical History   Past Surgical History:   Procedure Laterality Date    AS RAD RESEC TONSIL/PILLARS Bilateral 2015     SECTION N/A 2022    Procedure:  SECTION;  Surgeon: Becki Nieves MD;  Location: [facility]    DILATION AND CURETTAGE N/A 2019    Procedure: DILATION AND CURETTAGE, UTERUS, USING SUCTION;  Surgeon: Becki Nieves MD;  Location: [facility];  Service: Obstetrics       OB History    Para Term  AB Living   3 1 0 1 1 1   SAB IAB Ectopic Multiple Live Births   0 0 0 0 1      # Outcome Date GA Lbr Michele/2nd Weight Sex Type Anes PTL Lv   3 Current            2  22 33w2d  1.69 kg (3 lb 11.6 oz) M CS-LTranv Spinal  BESS      Complications: Preeclampsia/Hypertension      Name: ZENIA,MALE-SHITAL      Apgar1: 8  Apgar5: 9   1 AB 19              Social History   Social History     Tobacco Use    Smoking status: Never     Passive exposure: Never    Smokeless tobacco: Never   Vaping Use    Vaping status: Never Used   Substance Use Topics    Alcohol use: No     Comment: Alcoholic Drinks/day: occasional use    Drug use: No      Family History   No family history on file.     Prior to Admission Medications   Prior to Admission  Medications   Prescriptions Last Dose Informant Patient Reported? Taking?   NIFEdipine ER (ADALAT CC) 30 MG 24 hr tablet 12/15/2024 Evening  No Yes   Sig: Take 1 tablet (30 mg) by mouth 2 times daily.   acetaminophen (TYLENOL) 325 MG tablet 12/15/2024 Evening  Yes Yes   Sig: Take 325-650 mg by mouth every 6 hours as needed for headaches   escitalopram (LEXAPRO) 20 MG tablet 12/14/2024  Yes Yes   Sig: Take 20 mg by mouth daily.   hydrOXYzine (ATARAX) 50 MG tablet 12/14/2024  No Yes   Sig: Take 2 tablets (100 mg) by mouth 3 times daily as needed for anxiety   Patient taking differently: Take 100 mg by mouth 4 times daily as needed for anxiety.      Facility-Administered Medications: None     Allergies   Allergies   Allergen Reactions    Ondansetron Anxiety, Dizziness and Other (See Comments)       Physical Exam   Vital Signs with Ranges  Temp:  [98  F (36.7  C)-98.7  F (37.1  C)] 98  F (36.7  C)  Resp:  [18] 18  BP: (138-167)/() 143/99  SpO2:  [98 %] 98 %    Gen: no acute distress, resting comfortably   CV: acyanotic   Heart: regular rate and rhythm   Pulm: unlabored respirations, clear to ausculation bilaterally    Abd: gravid, soft, nontender   Extremities: soft, nontender     Recent Labs   Lab Test 12/06/24  1234   AS Negative     Recent Labs   Lab Test 12/06/24  1234   HEPBANG Nonreactive   HIAGAB Nonreactive   RUQIGG Positive     Cr 0.61  ALT 10  AST 17  Hgb: 10.3  Plts: 221    Fetal Heart Tones: 130s baseline, moderate variablility, + accels, no decels, and Category I  TOCO: Quiet    Assessment & Plan   Shital Tamayo is a 29 year old female who presents with elevated blood pressure  IUP @ 30w 5d .  NST reactive.  Category  I  -- CHTN with worsening blood pressure vs. Pre eclampsia. Protein/Creatinine pending. Other labs normal. She has had severe range BP treated with Nifedipine 30 mg and 20 mg IV labetalol. Will monitor Bps closely.   -- Admit for prolonged monitoring.   -- S/p Betamethasone 12/7 and  12/8  -- Rhogam not indicated     Maru Rios MD

## 2024-12-16 NOTE — PLAN OF CARE
Goal Outcome Evaluation:      Plan of Care Reviewed With: patient    Overall Patient Progress: no changeOverall Patient Progress: no change    Outcome Evaluation: Per patient, Oxycodone helped a little but headache is still persistent. Now patient is feeling nausea.  Dr. Rios updated on current status. New orders for Reglan.  Ice pack given for headache. Continue with lights off and phone off. Patient to continue resting and will re-evaluate in AM. Patient in agreement with plan of care.

## 2024-12-16 NOTE — PROGRESS NOTES
Antepartum Progress Note    Shital Tamayo MRN# [id number]   YOB: 1995 Age: 29 year old   Date of Admission: 12/15/2024             Assessment and Plan:   Shital Tamayo is a 29 year old  at 30w6d who is admitted on 12/15/2024 for elevated blood pressures and unremitting headache. She has a history of chronic hypertension as she had elevated blood pressures since 12 weeks gestation and was taking nifedipine XL.     -Severe range BP on admit 163/108.  -She had 6 severe range Bps on 12/15 between 1757 and   -Required 1 dose of IV labetalol on 12/15 at 1909  -Pre-E labs WNL, PCR 0.12   -Unremitting headache not relieved by Fioricet. She was given oxycodone last night with relief  -Reglan for nausea this morning  -Bps 120s-130s/80s since 003 today    It is difficult to determine if pt meets criteria for cHTN with superimposed pre-eclampsia given she does not have two documented severe range blood pressures greater than four hours apart and her labs are WNL. She does endorse headaches; however, her medical history is significant for migraines. This complicates criteria for presence of cerebral symptoms vs. migraine headache.     Her OB history is significant for a  delivery by  section on 2022 for pre-eclampsia with severe features, FGR <1%ile, unremitting migraine, and fetal intolerance to labor.     PMH Significant for:  Opioid use disorder  Depression  Chronic hypertension  Hx of pre-eclampsia  Hx  delivery  Hx  section  Migraine headache    Prenatal reviewed and indicates patient takes the following meds that she did not report on admission:  1) Adderall XR 30 mg: reports she took 3 weeks ago when she was working, but has not taken it since. Prenatal indicates this was last filled 24  2) Suboxone 8mg-2mg: reports she last took 1 month ago. Prenatal indicates this was last filled 24.   3) Tizanidine 4 mg: reports she last took 1 month ago.  Prenatal indicates this was last filled 24.       Plan:  -Reviewed case with Dr. Maxwell  -Ok for NST q shift. Continuous monitoring if rescue treatment for Bps is needed   -Continue antepartum plan of care, observe overnight   -Hypertension management per OB hypertension algorithm  -Consider betamethasone administration  -Dr. Maxwell to see pt tomorrow      -Fetal status:  -Monitoring: Continuous   -Betamethasone: no  -Magnesium for neuroprotection: no  -GBS: n/a          Attestation:  Amount of time performed on this daily note: 15 minutes.  Floor time: 10 minutes  Face-to-face time: 25 minutes  Total time: 25 minutes  Jessica Moritz, APRN CNP           Subjective:   Shital is doing well today, was hoping to go home, but understands reason for monitoring. Feeling better today, headache relieved.    Denies visual disturbances, epigastric pain, RUQ pain  +FM  No LOF/VB/ctx          Physical Exam:   Vitals were reviewed  Temp: 98.2  F (36.8  C) Temp src: Oral BP: 136/86 Pulse: (!) 121   Resp: 18 SpO2: 98 % O2 Device: None (Room air)    Wt Readings from Last 4 Encounters:   12/15/24 91.2 kg (201 lb)   24 91.2 kg (201 lb)   10/09/24 76.7 kg (169 lb)   23 67 kg (147 lb 9.6 oz)     Blood pressure range: Systolic (24hrs), Av , Min:114 , Max:167     Blood pressure range: Diastolic (24hrs), Av, Min:74, Max:116      Intake/Output Summary (Last 24 hours) at 2024 1714  Last data filed at 2024 1200  Gross per 24 hour   Intake 423 ml   Output 1000 ml   Net -577 ml       Gen: Alert, no acute distress  Abd: Gravid, nontender  Patellar reflexes +2 bilaterally  +1 bilateral lower extremity edema  No clonus bilaterally           EFM and Data:   Continuous EFM at this time  FHR baseline 140 bpm  Moderate variability  10 by 10 accelerations present  Decelerations absent    TOCO: no contractions  Uterus soft       Recent Results (from the past 24 hours)   CBC with platelets    Collection Time:  12/15/24  7:35 PM   Result Value Ref Range    WBC Count 11.3 (H) 4.0 - 11.0 10e3/uL    RBC Count 3.73 (L) 3.80 - 5.20 10e6/uL    Hemoglobin 10.9 (L) 11.7 - 15.7 g/dL    Hematocrit 32.3 (L) 35.0 - 47.0 %    MCV 87 78 - 100 fL    MCH 29.2 26.5 - 33.0 pg    MCHC 33.7 31.5 - 36.5 g/dL    RDW 13.4 10.0 - 15.0 %    Platelet Count 269 150 - 450 10e3/uL   Comprehensive Metabolic Panel    Collection Time: 12/15/24  7:35 PM   Result Value Ref Range    Sodium 136 135 - 145 mmol/L    Potassium 3.5 3.4 - 5.3 mmol/L    Carbon Dioxide (CO2) 23 22 - 29 mmol/L    Anion Gap 10 7 - 15 mmol/L    Urea Nitrogen 7.6 6.0 - 20.0 mg/dL    Creatinine 0.55 0.51 - 0.95 mg/dL    GFR Estimate >90 >60 mL/min/1.73m2    Calcium 8.6 (L) 8.8 - 10.4 mg/dL    Chloride 103 98 - 107 mmol/L    Glucose 96 70 - 99 mg/dL    Alkaline Phosphatase 81 40 - 150 U/L    AST 15 0 - 45 U/L    ALT 11 0 - 50 U/L    Protein Total 6.2 (L) 6.4 - 8.3 g/dL    Albumin 3.6 3.5 - 5.2 g/dL    Bilirubin Total 0.2 <=1.2 mg/dL   Protein  random urine    Collection Time: 12/15/24  9:07 PM   Result Value Ref Range    Total Protein Urine mg/dL 6.3   mg/dL    Total Protein Urine mg/mg Creat 0.12 0.00 - 0.20 mg/mg Cr    Creatinine Urine mg/dL 51.2 mg/dL   ALT    Collection Time: 12/16/24  5:23 AM   Result Value Ref Range    ALT 11 0 - 50 U/L   AST    Collection Time: 12/16/24  5:23 AM   Result Value Ref Range    AST 14 0 - 45 U/L   Creatinine    Collection Time: 12/16/24  5:23 AM   Result Value Ref Range    Creatinine 0.53 0.51 - 0.95 mg/dL    GFR Estimate >90 >60 mL/min/1.73m2   Hemoglobin    Collection Time: 12/16/24  5:23 AM   Result Value Ref Range    Hemoglobin 11.3 (L) 11.7 - 15.7 g/dL   Platelet count    Collection Time: 12/16/24  5:23 AM   Result Value Ref Range    Platelet Count 257 150 - 450 10e3/uL     No results found for this visit on 12/15/24.    All laboratory and imaging data in the past 24 hours reviewed

## 2024-12-16 NOTE — PLAN OF CARE
Goal Outcome Evaluation:      Plan of Care Reviewed With: patient    Overall Patient Progress: no changeOverall Patient Progress: no change    Outcome Evaluation: Per patient, Foricet did not help with headache. Patient told to turn off her phone and turn off her room lights. Patient given cooling cloth. Upon reassessment, patient continue to have headache. Dr. Rios updated on above. New orders of Oxycodone 5mg x1 dose now.

## 2024-12-16 NOTE — PLAN OF CARE
Problem: Adult Inpatient Plan of Care  Goal: Plan of Care Review  Description: The Plan of Care Review/Shift note should be completed every shift.  The Outcome Evaluation is a brief statement about your assessment that the patient is improving, declining, or no change.  This information will be displayed automatically on your shift  note.  Outcome: Progressing   Goal Outcome Evaluation:         Shital is doing well. Blood pressures have been WNL and not needed any treatment. Shital is aware she will be in the hospital at least until tomorrow 12/17. Shital emptying her own hat and writing total on white board for nursing staff.  Lizet Fernandes RN  12/16/2024 2:31 PM

## 2024-12-17 VITALS
RESPIRATION RATE: 16 BRPM | WEIGHT: 191.06 LBS | SYSTOLIC BLOOD PRESSURE: 135 MMHG | DIASTOLIC BLOOD PRESSURE: 91 MMHG | HEIGHT: 67 IN | HEART RATE: 97 BPM | OXYGEN SATURATION: 98 % | TEMPERATURE: 98.8 F | BODY MASS INDEX: 29.99 KG/M2

## 2024-12-17 LAB
ALT SERPL W P-5'-P-CCNC: 10 U/L (ref 0–50)
AST SERPL W P-5'-P-CCNC: 15 U/L (ref 0–45)
CREAT SERPL-MCNC: 0.54 MG/DL (ref 0.51–0.95)
EGFRCR SERPLBLD CKD-EPI 2021: >90 ML/MIN/1.73M2
HGB BLD-MCNC: 12 G/DL (ref 11.7–15.7)
PLATELET # BLD AUTO: 299 10E3/UL (ref 150–450)

## 2024-12-17 PROCEDURE — 85049 AUTOMATED PLATELET COUNT: CPT | Performed by: OBSTETRICS & GYNECOLOGY

## 2024-12-17 PROCEDURE — 84450 TRANSFERASE (AST) (SGOT): CPT | Performed by: OBSTETRICS & GYNECOLOGY

## 2024-12-17 PROCEDURE — 36415 COLL VENOUS BLD VENIPUNCTURE: CPT | Performed by: OBSTETRICS & GYNECOLOGY

## 2024-12-17 PROCEDURE — 82565 ASSAY OF CREATININE: CPT | Performed by: OBSTETRICS & GYNECOLOGY

## 2024-12-17 PROCEDURE — 250N000013 HC RX MED GY IP 250 OP 250 PS 637: Performed by: OBSTETRICS & GYNECOLOGY

## 2024-12-17 PROCEDURE — 85018 HEMOGLOBIN: CPT | Performed by: OBSTETRICS & GYNECOLOGY

## 2024-12-17 PROCEDURE — 84460 ALANINE AMINO (ALT) (SGPT): CPT | Performed by: OBSTETRICS & GYNECOLOGY

## 2024-12-17 RX ORDER — NIFEDIPINE 30 MG
30 TABLET, EXTENDED RELEASE ORAL 2 TIMES DAILY
Qty: 60 TABLET | Refills: 2 | Status: SHIPPED | OUTPATIENT
Start: 2024-12-17

## 2024-12-17 RX ADMIN — NIFEDIPINE 30 MG: 30 TABLET, EXTENDED RELEASE ORAL at 08:28

## 2024-12-17 ASSESSMENT — ACTIVITIES OF DAILY LIVING (ADL)
ADLS_ACUITY_SCORE: 22

## 2024-12-17 NOTE — DISCHARGE INSTRUCTIONS
High Blood Pressure in Pregnancy: Care Instructions  Overview     High blood pressure (hypertension) means that the force of blood against your artery walls is too strong.  High blood pressure problems during pregnancy include:  Chronic hypertension. This is high blood pressure that starts before pregnancy.  Gestational hypertension. This is high blood pressure that starts in the second or third trimester of pregnancy.  Preeclampsia. This is a problem that includes high blood pressure and signs of organ injury during pregnancy. In some cases, it leads to eclampsia. Eclampsia causes seizures.  High blood pressure during pregnancy can affect the amount of oxygen and nutrients your baby receives. This can affect how your baby grows. High blood pressure can also cause other serious problems for both you and your baby. For example, the placenta might separate too early from the wall of the uterus (placental abruption). This can cause serious bleeding or premature birth.  To prevent problems, you and your baby will be watched very closely. You will have to check your blood pressure often during and after the pregnancy.  If your blood pressure rises suddenly or is very high during pregnancy, your doctor may prescribe medicines. They can usually control blood pressure.  If your blood pressure affects your or your baby's health, you may need to be monitored in the hospital. You may get medicines. Or your doctor may need to deliver your baby early.  After your baby is born, your blood pressure will probably improve. But sometimes blood pressure problems continue after birth. If you had high blood pressure during pregnancy, you have more risk of having high blood pressure, heart disease, stroke, kidney disease, and diabetes later in life. Work with your doctor to make heart-healthy lifestyle choices. These include eating healthy foods, being active, staying at a healthy weight, and not smoking. Get the checkups you need.  Your doctor may also want you to check your blood pressure at home.  Follow-up care is a key part of your treatment and safety. Be sure to make and go to all appointments, and call your doctor if you are having problems. It's also a good idea to know your test results and keep a list of the medicines you take.  How can you care for yourself at home?  Take and write down your blood pressure at home if your doctor says to.  Take your medicines exactly as prescribed. Call your doctor if you think you are having a problem with your medicine.  Monitor yourself for symptoms of preeclampsia. Call your doctor if you have symptoms such as a severe headache, vision changes, or sudden swelling in your face and hands.  If you smoke, quit or cut back as much as you can. Smoking and vaping can be harmful to your baby. Talk to your doctor if you need help quitting.  Talk with your doctor about how much weight gain is healthy for you. Gaining too much weight while you're pregnant may be harmful.  Eat a variety of healthy foods. Include plenty of foods high in calcium, such as dairy products, almonds, and dark leafy greens.  If your doctor says it's okay, get regular exercise. Doing things like walking or swimming several times a week can be healthy for you and your baby.  Try to reduce stress and find time to relax. This is very important if you continue to work or have a busy schedule. It's also important if you have small children at home.  If your doctor recommends it, keep track of your baby's movement. A common method is to note the length of time it takes to count 10 movements (such as kicks, flutters, or rolls). Call your doctor if you don't feel at least 10 movements in a 2-hour period. Track your baby's movements once each day. Bring this record with you to each prenatal visit.  When should you call for help?  Share this information with your partner or a friend. They can help you watch for warning signs.  Call [phone]   "anytime you think you may need emergency care. For example, call if:    You passed out (lost consciousness).     You have a seizure.     You have trouble breathing.     You have chest pain.   Call your doctor now or seek immediate medical care if:    You have symptoms of preeclampsia, such as:  Sudden swelling of your face, hands, or feet.  New vision problems (such as dimness, blurring, or seeing spots).  A severe headache.     Your blood pressure is very high, such as 160/110 or higher.     Your blood pressure is higher than your doctor told you it should be, or it rises quickly.     You have any vaginal bleeding.     You have new nausea or vomiting.     You think that you are in labor.     You have pain in your belly or pelvis.     You gain weight rapidly.   Where can you learn more?  Go to https://www.[employer]Play.net/patiented  Enter A052 in the search box to learn more about \"High Blood Pressure in Pregnancy: Care Instructions.\"  Current as of: July 10, 2023  Content Version: 14.2 2024 [employer].   Care instructions adapted under license by your healthcare professional. If you have questions about a medical condition or this instruction, always ask your healthcare professional. Health[employer], [employer] disclaims any warranty or liability for your use of this information.  Learning About When to Call Your Doctor During Pregnancy (After 20 Weeks)  Overview  It's common to have concerns about what might be a problem when you're pregnant. Most pregnancies don't have any serious problems. But it's still important to know when to call your doctor if you have certain symptoms or signs of labor.  These are general suggestions. Your doctor may give you some more information about when to call.  When to call your doctor (after 20 weeks)  Call 911  anytime you think you may need emergency care. For example, call if:  You have severe vaginal bleeding. This means you are soaking through a pad each hour for 2 " or more hours.  You have sudden, severe pain in your belly.  You have chest pain, are short of breath, or cough up blood.  You passed out (lost consciousness).  You have a seizure.  You see or feel the umbilical cord.  You think you are about to deliver your baby and can't make it safely to the hospital or birthing center.  Call your doctor now or seek immediate medical care if:  You have vaginal bleeding.  You have belly pain.  You have a fever.  You are dizzy or lightheaded, or you feel like you may faint.  You have signs of a blood clot in your leg (called a deep vein thrombosis), such as:  Pain in the calf, back of the knee, thigh, or groin.  Swelling in your leg or groin.  A color change on the leg or groin. The skin may be reddish or purplish, depending on your usual skin color.  You have symptoms of preeclampsia, such as:  Sudden swelling of your face, hands, or feet.  New vision problems (such as dimness, blurring, or seeing spots).  A severe headache.  You have a sudden release of fluid from your vagina. (You think your water broke.)  You've been having regular contractions for an hour. This means that you've had at least 6 contractions within 1 hour, even after you change your position and drink fluids.  You notice that your baby has stopped moving or is moving less than normal.  You have signs of heart failure, such as:  New or increased shortness of breath.  New or worse swelling in your legs, ankles, or feet.  Sudden weight gain, such as more than 2 to 3 pounds in a day or 5 pounds in a week.  Feeling so tired or weak that you cannot do your usual activities.  You have symptoms of a urinary tract infection. These may include:  Pain or burning when you urinate.  A frequent need to urinate without being able to pass much urine.  Pain in the flank, which is just below the rib cage and above the waist on either side of the back.  Blood in your urine.  Watch closely for changes in your health, and be sure to  "contact your doctor if:  You have vaginal discharge that smells bad.  You feel sad, anxious, or hopeless for more than a few days.  You have skin changes, such as a rash, itching, or a yellow color to your skin.  You have other concerns about your pregnancy.  If you have labor signs at 37 weeks or more  If you have signs of labor at 37 weeks or more, your doctor may tell you to call when your labor becomes more active. Symptoms of active labor include:  Contractions that are regular.  Contractions that are less than 5 minutes apart.  Contractions that are hard to talk through.  Follow-up care is a key part of your treatment and safety. Be sure to make and go to all appointments, and call your doctor if you are having problems. It's also a good idea to know your test results and keep a list of the medicines you take.  Where can you learn more?  Go to https://www.[employer].net/patiented  Enter N531 in the search box to learn more about \"Learning About When to Call Your Doctor During Pregnancy (After 20 Weeks).\"  Current as of: July 10, 2023  Content Version: 14.2 2024 [facility] [employer].   Care instructions adapted under license by your healthcare professional. If you have questions about a medical condition or this instruction, always ask your healthcare professional. Healthwise[employer] disclaims any warranty or liability for your use of this information.    "

## 2024-12-17 NOTE — PROGRESS NOTES
Discharge orders received from Dr. Maxwell. Discharge instructions reviewed with Shital, no questions or concerns.   Shital was discharged to home undelivered.  Lizet Fernandes RN  12/17/2024 10:01 AM

## 2024-12-17 NOTE — PROGRESS NOTES
OB NOTE    Stable overnight  HA resolved  OK for discharge on Nifedipine with close Follow-up  Has BP at home with parameters to call  Appointment early next week    Ruddy Maxwell MD

## 2024-12-17 NOTE — PLAN OF CARE
Problem: Hypertensive Disorders in Pregnancy  Goal: Patient-Fetal Stabilization  Outcome: Progressing    Bps will continue to be WNL. Shital reported a mild 3/10 headache that was relieved with Tylenol. Reactive NST. Patient denies LOF, vaginal bleeding. +FM. Patient will call out with any changes.

## 2024-12-17 NOTE — PLAN OF CARE
Problem: Adult Inpatient Plan of Care  Goal: Plan of Care Review  Description: The Plan of Care Review/Shift note should be completed every shift.  The Outcome Evaluation is a brief statement about your assessment that the patient is improving, declining, or no change.  This information will be displayed automatically on your shift  note.  12/17/2024 0623 by Lilly Guadarrama RN  Flowsheets (Taken 12/17/2024 0623)  Plan of Care Reviewed With: patient  Overall Patient Progress: improving  12/17/2024 0100 by Lilly Guadararma RN  Outcome: Progressing     Problem: Adult Inpatient Plan of Care  Goal: Optimal Comfort and Wellbeing  Outcome: Progressing  Intervention: Provide Person-Centered Care  Recent Flowsheet Documentation  Taken 12/16/2024 2341 by Lilly Guadarrama RN  Trust Relationship/Rapport:   care explained   choices provided   emotional support provided   empathic listening provided   questions answered   questions encouraged   reassurance provided   thoughts/feelings acknowledged     Problem: Hypertensive Disorders in Pregnancy  Goal: Patient-Fetal Stabilization  Outcome: Progressing  Intervention: Optimize Blood Pressure and Fluid Status  Recent Flowsheet Documentation  Taken 12/16/2024 2341 by Lilly Guadarrama RN  Fetal Wellbeing Promotion:   fetal heart rate monitored   intake and output monitored   uterine contraction activity assessed  Fluid/Electrolyte Management: fluids provided  Intervention: Monitor and Manage Symptom Progression  Recent Flowsheet Documentation  Taken 12/16/2024 2341 by Lilly Guadarrama RN  Seizure Precautions: clutter-free environment maintained   Goal Outcome Evaluation:      Plan of Care Reviewed With: patient    Overall Patient Progress: improvingOverall Patient Progress: improving     VSS, , reactive. Denies pain, sleeps between cares and denies  questions or concerns at this time.

## 2024-12-29 ENCOUNTER — HOSPITAL ENCOUNTER (INPATIENT)
Facility: HOSPITAL | Age: 29
LOS: 2 days | Discharge: HOME | End: 2024-12-31
Attending: OBSTETRICS & GYNECOLOGY | Admitting: STUDENT IN AN ORGANIZED HEALTH CARE EDUCATION/TRAINING PROGRAM
Payer: COMMERCIAL

## 2024-12-29 DIAGNOSIS — O10.919 CHRONIC HYPERTENSION AFFECTING PREGNANCY: ICD-10-CM

## 2024-12-29 PROBLEM — R51.9 HEADACHE: Status: ACTIVE | Noted: 2024-12-29

## 2024-12-29 LAB
ABO + RH BLD: NORMAL
ALBUMIN MFR UR ELPH: 7.9 MG/DL
ALBUMIN SERPL BCG-MCNC: 3.3 G/DL (ref 3.5–5.2)
ALP SERPL-CCNC: 91 U/L (ref 40–150)
ALT SERPL W P-5'-P-CCNC: 10 U/L (ref 0–50)
ANION GAP SERPL CALCULATED.3IONS-SCNC: 10 MMOL/L (ref 7–15)
AST SERPL W P-5'-P-CCNC: 17 U/L (ref 0–45)
BILIRUB SERPL-MCNC: 0.2 MG/DL
BLD GP AB SCN SERPL QL: NEGATIVE
BUN SERPL-MCNC: 5.8 MG/DL (ref 6–20)
CALCIUM SERPL-MCNC: 8.7 MG/DL (ref 8.8–10.4)
CHLORIDE SERPL-SCNC: 104 MMOL/L (ref 98–107)
CREAT SERPL-MCNC: 0.57 MG/DL (ref 0.51–0.95)
CREAT UR-MCNC: 67.9 MG/DL
EGFRCR SERPLBLD CKD-EPI 2021: >90 ML/MIN/1.73M2
ERYTHROCYTE [DISTWIDTH] IN BLOOD BY AUTOMATED COUNT: 13.3 % (ref 10–15)
GLUCOSE SERPL-MCNC: 104 MG/DL (ref 70–99)
HCO3 SERPL-SCNC: 22 MMOL/L (ref 22–29)
HCT VFR BLD AUTO: 31.3 % (ref 35–47)
HGB BLD-MCNC: 10.1 G/DL (ref 11.7–15.7)
MCH RBC QN AUTO: 27.7 PG (ref 26.5–33)
MCHC RBC AUTO-ENTMCNC: 32.3 G/DL (ref 31.5–36.5)
MCV RBC AUTO: 86 FL (ref 78–100)
PLATELET # BLD AUTO: 176 10E3/UL (ref 150–450)
POTASSIUM SERPL-SCNC: 3.5 MMOL/L (ref 3.4–5.3)
PROT SERPL-MCNC: 5.7 G/DL (ref 6.4–8.3)
PROT/CREAT 24H UR: 0.12 MG/MG CR (ref 0–0.2)
RBC # BLD AUTO: 3.64 10E6/UL (ref 3.8–5.2)
SODIUM SERPL-SCNC: 136 MMOL/L (ref 135–145)
SPECIMEN EXP DATE BLD: NORMAL
WBC # BLD AUTO: 7.9 10E3/UL (ref 4–11)

## 2024-12-29 PROCEDURE — 85027 COMPLETE CBC AUTOMATED: CPT | Performed by: STUDENT IN AN ORGANIZED HEALTH CARE EDUCATION/TRAINING PROGRAM

## 2024-12-29 PROCEDURE — 86850 RBC ANTIBODY SCREEN: CPT | Performed by: STUDENT IN AN ORGANIZED HEALTH CARE EDUCATION/TRAINING PROGRAM

## 2024-12-29 PROCEDURE — 82040 ASSAY OF SERUM ALBUMIN: CPT | Performed by: STUDENT IN AN ORGANIZED HEALTH CARE EDUCATION/TRAINING PROGRAM

## 2024-12-29 PROCEDURE — 250N000013 HC RX MED GY IP 250 OP 250 PS 637: Performed by: STUDENT IN AN ORGANIZED HEALTH CARE EDUCATION/TRAINING PROGRAM

## 2024-12-29 PROCEDURE — 120N000001 HC R&B MED SURG/OB

## 2024-12-29 PROCEDURE — 84156 ASSAY OF PROTEIN URINE: CPT | Performed by: STUDENT IN AN ORGANIZED HEALTH CARE EDUCATION/TRAINING PROGRAM

## 2024-12-29 PROCEDURE — 36415 COLL VENOUS BLD VENIPUNCTURE: CPT | Performed by: STUDENT IN AN ORGANIZED HEALTH CARE EDUCATION/TRAINING PROGRAM

## 2024-12-29 PROCEDURE — 86900 BLOOD TYPING SEROLOGIC ABO: CPT | Performed by: STUDENT IN AN ORGANIZED HEALTH CARE EDUCATION/TRAINING PROGRAM

## 2024-12-29 RX ORDER — PANTOPRAZOLE SODIUM 40 MG/1
40 TABLET, DELAYED RELEASE ORAL
Status: DISCONTINUED | OUTPATIENT
Start: 2024-12-30 | End: 2024-12-31 | Stop reason: HOSPADM

## 2024-12-29 RX ORDER — ACETAMINOPHEN 325 MG/1
650 TABLET ORAL EVERY 4 HOURS PRN
Status: DISCONTINUED | OUTPATIENT
Start: 2024-12-29 | End: 2024-12-31 | Stop reason: HOSPADM

## 2024-12-29 RX ORDER — LABETALOL HYDROCHLORIDE 5 MG/ML
20 INJECTION, SOLUTION INTRAVENOUS
Status: DISCONTINUED | OUTPATIENT
Start: 2024-12-29 | End: 2024-12-31 | Stop reason: HOSPADM

## 2024-12-29 RX ORDER — ONDANSETRON 4 MG/1
4 TABLET, ORALLY DISINTEGRATING ORAL EVERY 6 HOURS PRN
Status: DISCONTINUED | OUTPATIENT
Start: 2024-12-29 | End: 2024-12-30

## 2024-12-29 RX ORDER — NALOXONE HYDROCHLORIDE 0.4 MG/ML
0.2 INJECTION, SOLUTION INTRAMUSCULAR; INTRAVENOUS; SUBCUTANEOUS
Status: DISCONTINUED | OUTPATIENT
Start: 2024-12-29 | End: 2024-12-31 | Stop reason: HOSPADM

## 2024-12-29 RX ORDER — CALCIUM CARBONATE 500 MG/1
500 TABLET, CHEWABLE ORAL ONCE
Status: COMPLETED | OUTPATIENT
Start: 2024-12-29 | End: 2024-12-29

## 2024-12-29 RX ORDER — NALOXONE HYDROCHLORIDE 0.4 MG/ML
0.4 INJECTION, SOLUTION INTRAMUSCULAR; INTRAVENOUS; SUBCUTANEOUS
Status: DISCONTINUED | OUTPATIENT
Start: 2024-12-29 | End: 2024-12-31 | Stop reason: HOSPADM

## 2024-12-29 RX ORDER — AMOXICILLIN 250 MG
2 CAPSULE ORAL 2 TIMES DAILY
Status: DISCONTINUED | OUTPATIENT
Start: 2024-12-30 | End: 2024-12-31 | Stop reason: HOSPADM

## 2024-12-29 RX ORDER — SIMETHICONE 80 MG
160 TABLET,CHEWABLE ORAL EVERY 6 HOURS PRN
Status: DISCONTINUED | OUTPATIENT
Start: 2024-12-29 | End: 2024-12-31 | Stop reason: HOSPADM

## 2024-12-29 RX ORDER — METOCLOPRAMIDE HYDROCHLORIDE 5 MG/ML
10 INJECTION INTRAMUSCULAR; INTRAVENOUS EVERY 6 HOURS PRN
Status: DISCONTINUED | OUTPATIENT
Start: 2024-12-29 | End: 2024-12-31 | Stop reason: HOSPADM

## 2024-12-29 RX ORDER — CYCLOBENZAPRINE HCL 5 MG
5 TABLET ORAL EVERY 8 HOURS PRN
Status: DISCONTINUED | OUTPATIENT
Start: 2024-12-29 | End: 2024-12-31 | Stop reason: HOSPADM

## 2024-12-29 RX ORDER — POLYETHYLENE GLYCOL 3350 17 G/17G
17 POWDER, FOR SOLUTION ORAL DAILY
Status: DISCONTINUED | OUTPATIENT
Start: 2024-12-30 | End: 2024-12-31 | Stop reason: HOSPADM

## 2024-12-29 RX ORDER — LABETALOL HYDROCHLORIDE 5 MG/ML
20-80 INJECTION, SOLUTION INTRAVENOUS EVERY 10 MIN PRN
Status: DISCONTINUED | OUTPATIENT
Start: 2024-12-29 | End: 2024-12-31 | Stop reason: HOSPADM

## 2024-12-29 RX ORDER — SODIUM CHLORIDE, SODIUM LACTATE, POTASSIUM CHLORIDE, CALCIUM CHLORIDE 600; 310; 30; 20 MG/100ML; MG/100ML; MG/100ML; MG/100ML
10-125 INJECTION, SOLUTION INTRAVENOUS CONTINUOUS
Status: DISCONTINUED | OUTPATIENT
Start: 2024-12-29 | End: 2024-12-31 | Stop reason: HOSPADM

## 2024-12-29 RX ORDER — METOCLOPRAMIDE 10 MG/1
10 TABLET ORAL EVERY 6 HOURS PRN
Status: DISCONTINUED | OUTPATIENT
Start: 2024-12-29 | End: 2024-12-31 | Stop reason: HOSPADM

## 2024-12-29 RX ORDER — CALCIUM CARBONATE 500(1250)
500 TABLET ORAL ONCE
Status: DISCONTINUED | OUTPATIENT
Start: 2024-12-29 | End: 2024-12-29

## 2024-12-29 RX ORDER — OXYCODONE HYDROCHLORIDE 5 MG/1
5 TABLET ORAL EVERY 4 HOURS PRN
Status: DISCONTINUED | OUTPATIENT
Start: 2024-12-29 | End: 2024-12-30

## 2024-12-29 RX ORDER — LIDOCAINE 40 MG/G
CREAM TOPICAL
Status: DISCONTINUED | OUTPATIENT
Start: 2024-12-29 | End: 2024-12-29 | Stop reason: HOSPADM

## 2024-12-29 RX ORDER — PRENATAL VIT/IRON FUM/FOLIC AC 27MG-0.8MG
1 TABLET ORAL DAILY
Status: DISCONTINUED | OUTPATIENT
Start: 2024-12-30 | End: 2024-12-31 | Stop reason: HOSPADM

## 2024-12-29 RX ORDER — DOCUSATE SODIUM 100 MG/1
100 CAPSULE, LIQUID FILLED ORAL 2 TIMES DAILY
Status: DISCONTINUED | OUTPATIENT
Start: 2024-12-30 | End: 2024-12-31 | Stop reason: HOSPADM

## 2024-12-29 RX ORDER — HYDRALAZINE HYDROCHLORIDE 20 MG/ML
10 INJECTION INTRAMUSCULAR; INTRAVENOUS
Status: DISCONTINUED | OUTPATIENT
Start: 2024-12-29 | End: 2024-12-31 | Stop reason: HOSPADM

## 2024-12-29 RX ORDER — AMOXICILLIN 250 MG
1 CAPSULE ORAL 2 TIMES DAILY
Status: DISCONTINUED | OUTPATIENT
Start: 2024-12-30 | End: 2024-12-31 | Stop reason: HOSPADM

## 2024-12-29 RX ORDER — CALCIUM CARBONATE 500 MG/1
1000 TABLET, CHEWABLE ORAL EVERY 6 HOURS PRN
Status: DISCONTINUED | OUTPATIENT
Start: 2024-12-29 | End: 2024-12-31 | Stop reason: HOSPADM

## 2024-12-29 RX ORDER — ONDANSETRON 2 MG/ML
4 INJECTION INTRAMUSCULAR; INTRAVENOUS EVERY 6 HOURS PRN
Status: DISCONTINUED | OUTPATIENT
Start: 2024-12-29 | End: 2024-12-30

## 2024-12-29 RX ORDER — MAGNESIUM HYDROXIDE/ALUMINUM HYDROXICE/SIMETHICONE 120; 1200; 1200 MG/30ML; MG/30ML; MG/30ML
30 SUSPENSION ORAL 2 TIMES DAILY PRN
Status: DISCONTINUED | OUTPATIENT
Start: 2024-12-29 | End: 2024-12-31 | Stop reason: HOSPADM

## 2024-12-29 RX ORDER — LIDOCAINE 40 MG/G
CREAM TOPICAL
Status: DISCONTINUED | OUTPATIENT
Start: 2024-12-29 | End: 2024-12-31 | Stop reason: HOSPADM

## 2024-12-29 RX ORDER — SODIUM PHOSPHATE,MONO-DIBASIC 19G-7G/118
1 ENEMA (ML) RECTAL DAILY PRN
Status: DISCONTINUED | OUTPATIENT
Start: 2024-12-29 | End: 2024-12-31 | Stop reason: HOSPADM

## 2024-12-29 RX ORDER — BISACODYL 10 MG
10 SUPPOSITORY, RECTAL RECTAL DAILY PRN
Status: DISCONTINUED | OUTPATIENT
Start: 2024-12-29 | End: 2024-12-31 | Stop reason: HOSPADM

## 2024-12-29 RX ORDER — PROCHLORPERAZINE MALEATE 10 MG
10 TABLET ORAL EVERY 6 HOURS PRN
Status: DISCONTINUED | OUTPATIENT
Start: 2024-12-29 | End: 2024-12-31 | Stop reason: HOSPADM

## 2024-12-29 RX ORDER — HYDROXYZINE HYDROCHLORIDE 50 MG/1
50 TABLET, FILM COATED ORAL
Status: DISCONTINUED | OUTPATIENT
Start: 2024-12-29 | End: 2024-12-31 | Stop reason: HOSPADM

## 2024-12-29 RX ORDER — DIPHENHYDRAMINE HCL 25 MG
25 CAPSULE ORAL EVERY 6 HOURS PRN
Status: DISCONTINUED | OUTPATIENT
Start: 2024-12-29 | End: 2024-12-31 | Stop reason: HOSPADM

## 2024-12-29 RX ORDER — DIPHENHYDRAMINE HYDROCHLORIDE 50 MG/ML
25 INJECTION INTRAMUSCULAR; INTRAVENOUS EVERY 6 HOURS PRN
Status: DISCONTINUED | OUTPATIENT
Start: 2024-12-29 | End: 2024-12-31 | Stop reason: HOSPADM

## 2024-12-29 RX ORDER — NIFEDIPINE 10 MG/1
10-20 CAPSULE ORAL
Status: DISCONTINUED | OUTPATIENT
Start: 2024-12-29 | End: 2024-12-31 | Stop reason: HOSPADM

## 2024-12-29 RX ORDER — BUTALBITAL, ACETAMINOPHEN AND CAFFEINE 50; 325; 40 MG/1; MG/1; MG/1
2 TABLET ORAL EVERY 4 HOURS PRN
Status: DISCONTINUED | OUTPATIENT
Start: 2024-12-29 | End: 2024-12-30

## 2024-12-29 RX ADMIN — BUTALBITAL, ACETAMINOPHEN, AND CAFFEINE 1 TABLET: 50; 325; 40 TABLET ORAL at 19:21

## 2024-12-29 RX ADMIN — CALCIUM CARBONATE (ANTACID) CHEW TAB 500 MG 500 MG: 500 CHEW TAB at 22:13

## 2024-12-29 RX ADMIN — CYCLOBENZAPRINE HYDROCHLORIDE 5 MG: 5 TABLET, FILM COATED ORAL at 22:14

## 2024-12-29 RX ADMIN — OXYCODONE HYDROCHLORIDE 5 MG: 5 TABLET ORAL at 20:31

## 2024-12-29 ASSESSMENT — ACTIVITIES OF DAILY LIVING (ADL)
ADLS_ACUITY_SCORE: 22

## 2024-12-30 ENCOUNTER — ANCILLARY PROCEDURE (OUTPATIENT)
Dept: ULTRASOUND IMAGING | Facility: HOSPITAL | Age: 29
End: 2024-12-30
Attending: OBSTETRICS & GYNECOLOGY
Payer: COMMERCIAL

## 2024-12-30 PROCEDURE — 250N000013 HC RX MED GY IP 250 OP 250 PS 637: Performed by: OBSTETRICS & GYNECOLOGY

## 2024-12-30 PROCEDURE — 99254 IP/OBS CNSLTJ NEW/EST MOD 60: CPT | Mod: 25 | Performed by: STUDENT IN AN ORGANIZED HEALTH CARE EDUCATION/TRAINING PROGRAM

## 2024-12-30 PROCEDURE — 120N000001 HC R&B MED SURG/OB

## 2024-12-30 PROCEDURE — 258N000003 HC RX IP 258 OP 636: Performed by: OBSTETRICS & GYNECOLOGY

## 2024-12-30 PROCEDURE — 76811 OB US DETAILED SNGL FETUS: CPT | Mod: 26 | Performed by: STUDENT IN AN ORGANIZED HEALTH CARE EDUCATION/TRAINING PROGRAM

## 2024-12-30 PROCEDURE — 87077 CULTURE AEROBIC IDENTIFY: CPT | Performed by: STUDENT IN AN ORGANIZED HEALTH CARE EDUCATION/TRAINING PROGRAM

## 2024-12-30 PROCEDURE — 76820 UMBILICAL ARTERY ECHO: CPT | Mod: 26 | Performed by: STUDENT IN AN ORGANIZED HEALTH CARE EDUCATION/TRAINING PROGRAM

## 2024-12-30 PROCEDURE — 258N000003 HC RX IP 258 OP 636: Performed by: STUDENT IN AN ORGANIZED HEALTH CARE EDUCATION/TRAINING PROGRAM

## 2024-12-30 PROCEDURE — 87653 STREP B DNA AMP PROBE: CPT | Performed by: STUDENT IN AN ORGANIZED HEALTH CARE EDUCATION/TRAINING PROGRAM

## 2024-12-30 PROCEDURE — 76818 FETAL BIOPHYS PROFILE W/NST: CPT | Mod: 26 | Performed by: STUDENT IN AN ORGANIZED HEALTH CARE EDUCATION/TRAINING PROGRAM

## 2024-12-30 PROCEDURE — 250N000013 HC RX MED GY IP 250 OP 250 PS 637: Performed by: STUDENT IN AN ORGANIZED HEALTH CARE EDUCATION/TRAINING PROGRAM

## 2024-12-30 PROCEDURE — 87186 SC STD MICRODIL/AGAR DIL: CPT | Performed by: STUDENT IN AN ORGANIZED HEALTH CARE EDUCATION/TRAINING PROGRAM

## 2024-12-30 PROCEDURE — 76819 FETAL BIOPHYS PROFIL W/O NST: CPT

## 2024-12-30 PROCEDURE — 250N000011 HC RX IP 250 OP 636: Performed by: OBSTETRICS & GYNECOLOGY

## 2024-12-30 PROCEDURE — 76811 OB US DETAILED SNGL FETUS: CPT

## 2024-12-30 RX ORDER — HYDROXYZINE HYDROCHLORIDE 50 MG/1
50 TABLET, FILM COATED ORAL EVERY 6 HOURS PRN
Status: DISCONTINUED | OUTPATIENT
Start: 2024-12-30 | End: 2024-12-31 | Stop reason: HOSPADM

## 2024-12-30 RX ORDER — ESCITALOPRAM OXALATE 10 MG/1
20 TABLET ORAL DAILY
Status: DISCONTINUED | OUTPATIENT
Start: 2024-12-30 | End: 2024-12-31 | Stop reason: HOSPADM

## 2024-12-30 RX ORDER — NIFEDIPINE 30 MG
30 TABLET, EXTENDED RELEASE ORAL DAILY
Status: DISCONTINUED | OUTPATIENT
Start: 2024-12-31 | End: 2024-12-31 | Stop reason: HOSPADM

## 2024-12-30 RX ORDER — NIFEDIPINE 30 MG
30 TABLET, EXTENDED RELEASE ORAL 2 TIMES DAILY
Status: DISCONTINUED | OUTPATIENT
Start: 2024-12-30 | End: 2024-12-30

## 2024-12-30 RX ORDER — HYDROXYZINE HYDROCHLORIDE 50 MG/1
50 TABLET, FILM COATED ORAL 4 TIMES DAILY PRN
Status: DISCONTINUED | OUTPATIENT
Start: 2024-12-30 | End: 2024-12-31 | Stop reason: HOSPADM

## 2024-12-30 RX ADMIN — DIPHENHYDRAMINE HYDROCHLORIDE: 50 INJECTION INTRAMUSCULAR; INTRAVENOUS at 13:48

## 2024-12-30 RX ADMIN — PANTOPRAZOLE SODIUM 40 MG: 40 TABLET, DELAYED RELEASE ORAL at 11:26

## 2024-12-30 RX ADMIN — ESCITALOPRAM OXALATE 20 MG: 10 TABLET ORAL at 11:26

## 2024-12-30 RX ADMIN — SODIUM CHLORIDE, POTASSIUM CHLORIDE, SODIUM LACTATE AND CALCIUM CHLORIDE 999 ML/HR: 600; 310; 30; 20 INJECTION, SOLUTION INTRAVENOUS at 11:18

## 2024-12-30 RX ADMIN — CYCLOBENZAPRINE HYDROCHLORIDE 5 MG: 5 TABLET, FILM COATED ORAL at 11:30

## 2024-12-30 RX ADMIN — PRENATAL VIT W/ FE FUMARATE-FA TAB 27-0.8 MG 1 TABLET: 27-0.8 TAB at 11:27

## 2024-12-30 RX ADMIN — HYDROXYZINE HYDROCHLORIDE 50 MG: 50 TABLET, FILM COATED ORAL at 11:27

## 2024-12-30 ASSESSMENT — ACTIVITIES OF DAILY LIVING (ADL)
ADLS_ACUITY_SCORE: 22

## 2024-12-30 NOTE — CONSULTS
Maternal-Fetal Medicine Consultation    Shital Tamayo  : 1995  MRN: [id number]    REFERRAL:  Shital Tamayo is a 29 year old admitted to the hospital for whom a maternal fetal medicine consultation has been requested.    HPI:  Shital Tamayo is a 29 year old  at 32w6d by LMP consistent with 10w6d US admitted to the hospital for persistent headache and blood pressure monitoring in the setting of chronic hypertension.    Regarding her current headache, she reports a 4-5 day history of right sided headache. Headache is described as constant and not pounding. No aura, vision changes, or neurologic deficits. She has a history of migraines but reports that this headache feels different. The headache at its worst was 7/10 and has improved currently to 3/10. At home she tried Tylenol and benadryl without relief. Since admission she has received reglan, escig, flexeril, oxycodone with some relief. She has had neuroimaging in the past and has seen a neurologist for her headaches. Takes imitrex outside of pregnancy.     Regarding her chronic hypertension, she reports that she has a history of preeclampsia in her prior pregnancy requiring delivery at 33w2d. She reports that she was on blood pressure medications in this pregnancy, however, was able to discontinue postpartum. Her blood pressures were reportedly within normal limits prior to current pregnancy. Due to persistent mild range blood pressures she was started on labetalol around 12 weeks. Given persistently elevated blood pressures she was switched to nifedipine 30mg BID around 24 weeks and stopped taking labetalol per her report. She reports that she has ongoing intermittently elevated blood pressures at home to 160s-170s systolic. She was previously admitted to the hospital from - during which she received betamethasone. She was similarly evaluated on 12/15 for elevated blood pressures that improved and was ultimately discharged home. She had  a non sustained severe range blood pressures yesterday with multiple mild range blood pressures. Today her blood pressures have been low normal. She has not received any of her blood pressure medications today due to low blood pressures. She reports she is consistent with taking her blood pressures at home. No evidence of preeclampsia at this time with normal labs on admission (Hct 31.3, Plt 176, Cr 0.57, ALT 10, AST 17, PrCr 0.12).     Obstetrics History:  OB History    Para Term  AB Living   3 1 0 1 1 1   SAB IAB Ectopic Multiple Live Births   0 0 0 0 1      # Outcome Date GA Lbr Michele/2nd Weight Sex Type Anes PTL Lv   3 Current            2  22 33w2d  1.69 kg (3 lb 11.6 oz) M CS-LTranv Spinal  BESS      Complications: Preeclampsia/Hypertension      Name: ZENIA,HARMEET-GENEVIEVE      Apgar1: 8  Apgar5: 9   1 AB 19             Past Medical History:  Past Medical History:   Diagnosis Date    ADHD (attention deficit hyperactivity disorder)     Anxiety     Depressive disorder      Past Surgical History:  Past Surgical History:   Procedure Laterality Date    AS RAD RESEC TONSIL/PILLARS Bilateral 2015     SECTION N/A 2022    Procedure:  SECTION;  Surgeon: Becki Nieves MD;  Location: [facility]    DILATION AND CURETTAGE N/A 2019    Procedure: DILATION AND CURETTAGE, UTERUS, USING SUCTION;  Surgeon: Becki Nieves MD;  Location: [facility];  Service: Obstetrics     Current Medications:  Prior to Admission medications    Medication Sig Last Dose Taking? Auth Provider Long Term End Date   aspirin-acetaminophen-caffeine (EXCEDRIN MIGRAINE) 250-250-65 MG tablet Take 1 tablet by mouth daily as needed for headaches. 2024 at  4:00 PM Yes Reported, Patient     escitalopram (LEXAPRO) 20 MG tablet Take 20 mg by mouth daily. 2024 at  9:00 AM Yes Reported, Patient No    hydrOXYzine (ATARAX) 50 MG tablet Take 2 tablets (100 mg) by  mouth 3 times daily as needed for anxiety 2024 at 12:00 PM Yes Jing Mcginnis MD     NIFEdipine ER (ADALAT CC) 30 MG 24 hr tablet Take 1 tablet (30 mg) by mouth 2 times daily. 2024 at  3:00 PM Yes Maru Gibbons MD Yes    ADDERALL XR 30 MG 24 hr capsule Take 30 mg by mouth daily. Unknown  Reported, Patient No      Allergies:  Ondansetron    PHYSICAL EXAM:    Gen: NAD  Abdomen: soft, non tender  Extrem: trace edema    EFM  FHR: 120/mod/accel/rare variable deceleration  [place]: 0/10 averaged over 30 minutes    Other Imagin/[facility] US:  1. Sofia pregnancy at 32w 6d gestational age.  2. No fetal anomalies commonly detected by ultrasound were identified in the detailed fetal anatomic survey within the limits of prenatal ultrasound, however some views  were suboptimal, as described above.  3. Fetal growth restriction was noted with EFW 2%, AC <1%.  4. The amniotic fluid volume appeared normal.  5. The umbilical artery Dopplers were normal.  6. On transabdominal imaging the cervix appeared long and closed.  7. BPP is reassuring.    Labs:  Component      Latest Ref Rng 2024  6:53 PM 2024  7:02 PM   Sodium      135 - 145 mmol/L 136     Potassium      3.4 - 5.3 mmol/L 3.5     Carbon Dioxide (CO2)      22 - 29 mmol/L 22     Anion Gap      7 - 15 mmol/L 10     Urea Nitrogen      6.0 - 20.0 mg/dL 5.8 (L)     Creatinine      0.51 - 0.95 mg/dL 0.57     GFR Estimate      >60 mL/min/1.73m2 >90     Calcium      8.8 - 10.4 mg/dL 8.7 (L)     Chloride      98 - 107 mmol/L 104     Glucose      70 - 99 mg/dL 104 (H)     Alkaline Phosphatase      40 - 150 U/L 91     AST      0 - 45 U/L 17     ALT      0 - 50 U/L 10     Protein Total      6.4 - 8.3 g/dL 5.7 (L)     Albumin      3.5 - 5.2 g/dL 3.3 (L)     Bilirubin Total      <=1.2 mg/dL 0.2     WBC      4.0 - 11.0 10e3/uL 7.9     RBC Count      3.80 - 5.20 10e6/uL 3.64 (L)     Hemoglobin      11.7 - 15.7 g/dL 10.1 (L)     Hematocrit      35.0 -  47.0 % 31.3 (L)     MCV      78 - 100 fL 86     MCH      26.5 - 33.0 pg 27.7     MCHC      31.5 - 36.5 g/dL 32.3     RDW      10.0 - 15.0 % 13.3     Platelet Count      150 - 450 10e3/uL 176     Total Protein Urine mg/dL        mg/dL  7.9    Total Protein Urine mg/mg Creat      0.00 - 0.20 mg/mg Cr  0.12    Creatinine Urine      mg/dL  67.9    ABO/Rh(D) A POS     Antibody Screen      Negative  Negative     SPECIMEN EXPIRATION DATE [id number]         ASSESSMENT/PLAN:  Shital Tamayo is a 29 year old  at 32w6d by LMP consistent with 10w6d US admitted to the hospital for persistent headache and blood pressure monitoring in the setting of chronic hypertension.    #Chronic Hypertension  #History of Preeclampsia  #Headache  Patient has a known diagnosis of chronic hypertension requiring medications as well a history of preeclampsia requiring early delivery in a previous pregnancy. We reviewed that concern with chronic hypertension is that patients are more likely to develop superimposed preeclampsia which is characterized by worsening blood pressures and proteinuria. In some cases these can be associated with laboratory changes and symptoms of end organ dysfunction including headache, vision changes, pulmonary edema, etc.     We reviewed her blood pressure trend since arrival which have fluctuated from a non sustained severe range, multiple mild ranges, and now low-normal blood pressures (requiring antihypertensives to be held). We additionally reviewed her labs which are within normal limits without proteinuria (Hct 31.3, Plt 176, Cr 0.57, ALT 10, AST 17, PrCr 0.12). Current blood pressures and absence of proteinuria are not suggestive of superimposed preeclampsia at this time, however, require ongoing close evaluation as this is a progressive condition that can develop over time.    We reviewed that her persistent headache (which is different from her baseline headache) is concerning and requiring ongoing  observation and management. I encouraged ongoing supportive therapy including trial of tylenol, reglan, flexeril, magnesium 1g infusion, and imitrex as desired to see if this provides relief. If headache is persistent recommend neurology referral with consideration for head imaging. Currently, this is not felt to be a severe feature of preeclampsia, however, will need to continue to monitor carefully and if all other diagnoses are excluded may need to reevaluate as patient certainly has risk factors for developing preeclampsia with underlying chronic hypertension and now new onset fetal growth restriction (see below).    #Fetal Growth Restriction  The findings on today's ultrasound are consistent with fetal growth restriction (FGR). We reviewed that we consider a pregnancy to be affected by FGR when the overall EFW is <10th% or if the abdominal circumference (AC) is < 10th% as they have been found to be equally predictive of SGA. We discussed the potential etiologies of FGR including incorrect dating, constitutional, infectious etiologies (specifically CMV), fetal genetic and structural abnormalities as well as placental and umbilical cord abnormalities. Her dating was reviewed (She is dated by LMP c/w 10w6d US). She had cell free DNA with low risk result for genetic screening. She has a known diagnosis of chronic hypertension on medications. She had occasional adderall use in pregnancy, however, has discontinued. We reviewed the possible/recommended work up for FGR. We reviewed that there are other genetic causes of fetal growth restriction, however, based on current gestational age would defer genetic testing until after delivery if warranted at that time. Lastly, we reviewed that regardless of the etiology of FGR, we recommend additional monitoring due to the increased risk for stillbirth and that the ultimate goal of management rests on balancing the risk of stillbirth with the risks of prematurity. The  recommended surveillance and management of pregnancies complicated by FGR includes serial assessment of fetal growth (every 3 weeks) in addition to weekly UA Dopplers and  surveillance. Timing of delivery will depend on Doppler findings, amniotic fluid, fetal monitoring, interval fetal growth, and ongoing blood pressure monitoring; we will make specific recommendations as the pregnancy progresses.    Recommendations:  - Continue inpatient monitoring to assess for signs of developing preeclampsia for at least 48 hours since admission. Currently no evidence of superimposed preeclampsia and headache not felt to be a severe feature. This will require ongoing reassessment  - Serial blood pressure monitoring with goal <140/90. If blood pressure medication dose increase is needed consider diagnosis of superimposed preeclampsia.   - Repeat preeclampsia labs in the morning  - Continue supportive care for headache (consider hydration, tylenol, reglan, magnesium sulfate 1g infusion, imitrex) to see if provides relief. Avoid narcotic pain medication as this is not helpful and avoid escig/fioricet as this can cause rebound headache  - If headache is persistent in morning, recommend neurology referral for ongoing evaluation and likely head imaging (MRI)  - Currently on continuous fetal monitoring, can space to TID if remains reassuring   - She has already received initial BMZ dose on -. Consider rescue BMZ if there is a change in clinical status and concern for impending delivery.   - Prophylactic anticoagulation should be initiated if stable and hospitalized for >72 hours   - If headaches resolves, blood pressures remain within range and maternal/fetal status are reassuring patient may be a candidate for discharge home. If discharged will need ongoing weekly follow up with MFM due to newly diagnosed fetal growth restriction.     Thank you for allowing us to participate in the care of your patient. Please do not  hesitate to contact us if you have further questions regarding the management of your patient.     I have seen and evaluated the patient. I spent a total of 60 minutes on the date of this encounter including preparing to see the patient (reviewing medical records/tests), counseling and discussing the plan of care, documenting the visit in the electronic medical record, and communicating with other health care professionals and/or care coordination.      Tamra Jiang MD  Maternal Fetal Medicine   12/30/2024 5:34 PM

## 2024-12-30 NOTE — H&P
Date: 2024  Time: 11:39 PM    Admission H&P  Shital Knutson,  1995, MRN [id number]    PCP: Siobhan Carter, [phone]  Primary OB: Alissa   Code status:  Full Code              Chief Complaint: Headache, cHTN     OBSTETRICAL / DATING HISTORY:  Estimated Date of Delivery: Estimated Date of Delivery: 2025  Gestational Age: 32w5d    OB History    Para Term  AB Living   3 1 0 1 1 1   SAB IAB Ectopic Multiple Live Births   0 0 0 0 1      # Outcome Date GA Lbr Michele/2nd Weight Sex Type Anes PTL Lv   3 Current            2  22 33w2d  1.69 kg (3 lb 11.6 oz) M CS-LTranv Spinal  BESS      Complications: Preeclampsia/Hypertension      Name: HARMEET KNUTSON-SHITAL      Apgar1: 8  Apgar5: 9   1 AB 19               HPI:    Shital Knutson is a 29 year old year old at 32w5d weeks. She presented to L&D [facility] for HA.  She has known cHTN and is on 2 meds (labetalol and procardia). She has been on both meds since <20 weeks gestation and had documented mild-range BP's <20 weeks gestation most consistent with cHTN.    [facility] she presented to triage for a HA, present for 3 days and different than other HA's she has had.  She tried excedrin at home, minimal improvement in triage tonight after esgic, flexeril and oxycodone.  Some improvement from 6/10 to 5/10, headache persists despite improvement in BP's.    She is planning to deliver via [facility]    OB Hx: 1 prior     Medical History  Past Medical History:   Diagnosis Date    ADHD (attention deficit hyperactivity disorder)     Anxiety     Depressive disorder        Surgical History  Past Surgical History:   Procedure Laterality Date    AS RAD RESEC TONSIL/PILLARS Bilateral 2015     SECTION N/A 2022    Procedure:  SECTION;  Surgeon: Becki Nieves MD;  Location: [facility]    DILATION AND CURETTAGE N/A 2019    Procedure: DILATION AND CURETTAGE, UTERUS, USING SUCTION;  Surgeon: Ezequiel  Becki Storm MD;  Location: [facility] OR;  Service: Obstetrics       Social History  Social History     Socioeconomic History    Marital status: Single     Spouse name: Not on file    Number of children: Not on file    Years of education: Not on file    Highest education level: Not on file   Occupational History    Not on file   Tobacco Use    Smoking status: Never     Passive exposure: Never    Smokeless tobacco: Never   Vaping Use    Vaping status: Never Used   Substance and Sexual Activity    Alcohol use: No     Comment: Alcoholic Drinks/day: occasional use    Drug use: No    Sexual activity: Not Currently     Partners: Male   Other Topics Concern    Not on file   Social History Narrative    Not on file     Social Drivers of Health     Financial Resource Strain: Low Risk  (12/16/2024)    Financial Resource Strain     Within the past 12 months, have you or your family members you live with been unable to get utilities (heat, electricity) when it was really needed?: No   Food Insecurity: Low Risk  (12/16/2024)    Food Insecurity     Within the past 12 months, did you worry that your food would run out before you got money to buy more?: No     Within the past 12 months, did the food you bought just not last and you didn t have money to get more?: No   Transportation Needs: Low Risk  (12/16/2024)    Transportation Needs     Within the past 12 months, has lack of transportation kept you from medical appointments, getting your medicines, non-medical meetings or appointments, work, or from getting things that you need?: No   Physical Activity: Not on file   Stress: Not on file   Social Connections: Socially Integrated (6/7/2024)    [facility]    Social Connections     Do you often feel lonely or isolated from those around you?: 0   Interpersonal Safety: Low Risk  (12/15/2024)    Interpersonal Safety     Do you feel physically and emotionally safe where you currently  "live?: Yes     Within the past 12 months, have you been hit, slapped, kicked or otherwise physically hurt by someone?: No     Within the past 12 months, have you been humiliated or emotionally abused in other ways by your partner or ex-partner?: No   Housing Stability: Low Risk  (12/16/2024)    Housing Stability     Do you have housing? : Yes     Are you worried about losing your housing?: No       Allergies   Allergen Reactions    Ondansetron Anxiety, Dizziness and Other (See Comments)       Medications Prior to Admission   Medication Sig Dispense Refill Last Dose/Taking    aspirin-acetaminophen-caffeine (EXCEDRIN MIGRAINE) 250-250-65 MG tablet Take 1 tablet by mouth daily as needed for headaches.   12/29/2024 at  4:00 PM    escitalopram (LEXAPRO) 20 MG tablet Take 20 mg by mouth daily.   12/29/2024 at  9:00 AM    hydrOXYzine (ATARAX) 50 MG tablet Take 2 tablets (100 mg) by mouth 3 times daily as needed for anxiety 40 tablet 0 12/29/2024 at 12:00 PM    NIFEdipine ER (ADALAT CC) 30 MG 24 hr tablet Take 1 tablet (30 mg) by mouth 2 times daily.   12/29/2024 at  3:00 PM    ADDERALL XR 30 MG 24 hr capsule Take 30 mg by mouth daily.   Unknown       Review of Systems:  Otherwise negative except per HPI    Physical Exam:  Temp:  [98.6  F (37  C)] 98.6  F (37  C)  Resp:  [18] 18  BP: (117-171)/() 118/71    /71   Temp 98.6  F (37  C) (Oral)   Resp 18   Ht 1.676 m (5' 6\")   Wt 93.2 kg (205 lb 8 oz)   BMI 33.17 kg/m      General: NAD  CV: RRR  Lungs: clear  Abdomen: soft, gravid    Pertinent Labs  Admission on 12/29/2024   Component Date Value Ref Range Status    WBC Count 12/29/2024 7.9  4.0 - 11.0 10e3/uL Final    RBC Count 12/29/2024 3.64 (L)  3.80 - 5.20 10e6/uL Final    Hemoglobin 12/29/2024 10.1 (L)  11.7 - 15.7 g/dL Final    Hematocrit 12/29/2024 31.3 (L)  35.0 - 47.0 % Final    MCV 12/29/2024 86  78 - 100 fL Final    MCH 12/29/2024 27.7  26.5 - 33.0 pg Final    MCHC 12/29/2024 32.3  31.5 - 36.5 g/dL " Final    RDW 12/29/2024 13.3  10.0 - 15.0 % Final    Platelet Count 12/29/2024 176  150 - 450 10e3/uL Final    Sodium 12/29/2024 136  135 - 145 mmol/L Final    Potassium 12/29/2024 3.5  3.4 - 5.3 mmol/L Final    Carbon Dioxide (CO2) 12/29/2024 22  22 - 29 mmol/L Final    Anion Gap 12/29/2024 10  7 - 15 mmol/L Final    Urea Nitrogen 12/29/2024 5.8 (L)  6.0 - 20.0 mg/dL Final    Creatinine 12/29/2024 0.57  0.51 - 0.95 mg/dL Final    GFR Estimate 12/29/2024 >90  >60 mL/min/1.73m2 Final    eGFR calculated using 2021 CKD-EPI equation.    Calcium 12/29/2024 8.7 (L)  8.8 - 10.4 mg/dL Final    Reference intervals for this test were updated on 7/16/2024 to reflect our healthy population more accurately. There may be differences in the flagging of prior results with similar values performed with this method. Those prior results can be interpreted in the context of the updated reference intervals.    Chloride 12/29/2024 104  98 - 107 mmol/L Final    Glucose 12/29/2024 104 (H)  70 - 99 mg/dL Final    Alkaline Phosphatase 12/29/2024 91  40 - 150 U/L Final    AST 12/29/2024 17  0 - 45 U/L Final    ALT 12/29/2024 10  0 - 50 U/L Final    Protein Total 12/29/2024 5.7 (L)  6.4 - 8.3 g/dL Final    Albumin 12/29/2024 3.3 (L)  3.5 - 5.2 g/dL Final    Bilirubin Total 12/29/2024 0.2  <=1.2 mg/dL Final    Total Protein Urine mg/dL 12/29/2024 7.9    mg/dL Final    The reference ranges have not been established in urine protein. The results should be integrated into the clinical context for interpretation.    Total Protein Urine mg/mg Creat 12/29/2024 0.12  0.00 - 0.20 mg/mg Cr Final    Creatinine Urine mg/dL 12/29/2024 67.9  mg/dL Final    The reference ranges have not been established in urine creatinine. The results should be integrated into the clinical context for interpretation.   Admission on 12/15/2024, Discharged on 12/17/2024   Component Date Value Ref Range Status    WBC Count 12/15/2024 11.3 (H)  4.0 - 11.0 10e3/uL Final    RBC  Count 12/15/2024 3.73 (L)  3.80 - 5.20 10e6/uL Final    Hemoglobin 12/15/2024 10.9 (L)  11.7 - 15.7 g/dL Final    Hematocrit 12/15/2024 32.3 (L)  35.0 - 47.0 % Final    MCV 12/15/2024 87  78 - 100 fL Final    MCH 12/15/2024 29.2  26.5 - 33.0 pg Final    MCHC 12/15/2024 33.7  31.5 - 36.5 g/dL Final    RDW 12/15/2024 13.4  10.0 - 15.0 % Final    Platelet Count 12/15/2024 269  150 - 450 10e3/uL Final    Sodium 12/15/2024 136  135 - 145 mmol/L Final    Potassium 12/15/2024 3.5  3.4 - 5.3 mmol/L Final    Carbon Dioxide (CO2) 12/15/2024 23  22 - 29 mmol/L Final    Anion Gap 12/15/2024 10  7 - 15 mmol/L Final    Urea Nitrogen 12/15/2024 7.6  6.0 - 20.0 mg/dL Final    Creatinine 12/15/2024 0.55  0.51 - 0.95 mg/dL Final    GFR Estimate 12/15/2024 >90  >60 mL/min/1.73m2 Final    eGFR calculated using 2021 CKD-EPI equation.    Calcium 12/15/2024 8.6 (L)  8.8 - 10.4 mg/dL Final    Reference intervals for this test were updated on 7/16/2024 to reflect our healthy population more accurately. There may be differences in the flagging of prior results with similar values performed with this method. Those prior results can be interpreted in the context of the updated reference intervals.    Chloride 12/15/2024 103  98 - 107 mmol/L Final    Glucose 12/15/2024 96  70 - 99 mg/dL Final    Alkaline Phosphatase 12/15/2024 81  40 - 150 U/L Final    AST 12/15/2024 15  0 - 45 U/L Final    ALT 12/15/2024 11  0 - 50 U/L Final    Protein Total 12/15/2024 6.2 (L)  6.4 - 8.3 g/dL Final    Albumin 12/15/2024 3.6  3.5 - 5.2 g/dL Final    Bilirubin Total 12/15/2024 0.2  <=1.2 mg/dL Final    Total Protein Urine mg/dL 12/15/2024 6.3    mg/dL Final    The reference ranges have not been established in urine protein. The results should be integrated into the clinical context for interpretation.    Total Protein Urine mg/mg Creat 12/15/2024 0.12  0.00 - 0.20 mg/mg Cr Final    Creatinine Urine mg/dL 12/15/2024 51.2  mg/dL Final    The reference ranges have  not been established in urine creatinine. The results should be integrated into the clinical context for interpretation.    ALT 12/16/2024 11  0 - 50 U/L Final    AST 12/16/2024 14  0 - 45 U/L Final    Creatinine 12/16/2024 0.53  0.51 - 0.95 mg/dL Final    GFR Estimate 12/16/2024 >90  >60 mL/min/1.73m2 Final    eGFR calculated using 2021 CKD-EPI equation.    Hemoglobin 12/16/2024 11.3 (L)  11.7 - 15.7 g/dL Final    Platelet Count 12/16/2024 257  150 - 450 10e3/uL Final    ALT 12/17/2024 10  0 - 50 U/L Final    AST 12/17/2024 15  0 - 45 U/L Final    Creatinine 12/17/2024 0.54  0.51 - 0.95 mg/dL Final    GFR Estimate 12/17/2024 >90  >60 mL/min/1.73m2 Final    eGFR calculated using 2021 CKD-EPI equation.    Hemoglobin 12/17/2024 12.0  11.7 - 15.7 g/dL Final    Platelet Count 12/17/2024 299  150 - 450 10e3/uL Final   Admission on 12/07/2024, Discharged on 12/08/2024   Component Date Value Ref Range Status    Sodium 12/07/2024 138  135 - 145 mmol/L Final    Potassium 12/07/2024 3.6  3.4 - 5.3 mmol/L Final    Carbon Dioxide (CO2) 12/07/2024 23  22 - 29 mmol/L Final    Anion Gap 12/07/2024 11  7 - 15 mmol/L Final    Urea Nitrogen 12/07/2024 4.6 (L)  6.0 - 20.0 mg/dL Final    Creatinine 12/07/2024 0.61  0.51 - 0.95 mg/dL Final    GFR Estimate 12/07/2024 >90  >60 mL/min/1.73m2 Final    eGFR calculated using 2021 CKD-EPI equation.    Calcium 12/07/2024 8.5 (L)  8.8 - 10.4 mg/dL Final    Reference intervals for this test were updated on 7/16/2024 to reflect our healthy population more accurately. There may be differences in the flagging of prior results with similar values performed with this method. Those prior results can be interpreted in the context of the updated reference intervals.    Chloride 12/07/2024 104  98 - 107 mmol/L Final    Glucose 12/07/2024 102 (H)  70 - 99 mg/dL Final    Alkaline Phosphatase 12/07/2024 80  40 - 150 U/L Final    AST 12/07/2024 19  0 - 45 U/L Final    ALT 12/07/2024 12  0 - 50 U/L Final     Protein Total 12/07/2024 6.3 (L)  6.4 - 8.3 g/dL Final    Albumin 12/07/2024 3.6  3.5 - 5.2 g/dL Final    Bilirubin Total 12/07/2024 0.2  <=1.2 mg/dL Final    Total Protein Urine mg/dL 12/07/2024 <6.0    mg/dL Final    The reference ranges have not been established in urine protein. The results should be integrated into the clinical context for interpretation.    Total Protein Urine mg/mg Creat 12/07/2024    Final    Unable to calculate, urine creatinine or protein is outside the detectable limits.    Creatinine Urine mg/dL 12/07/2024 8.2  mg/dL Final    The reference ranges have not been established in urine creatinine. The results should be integrated into the clinical context for interpretation.    Magnesium 12/07/2024 1.7  1.7 - 2.3 mg/dL Final    Haptoglobin 12/07/2024 102  30 - 200 mg/dL Final    Lactate Dehydrogenase 12/07/2024 191  0 - 250 U/L Final    Fibrinogen Activity 12/07/2024 359  170 - 510 mg/dL Final    Effective 7/30/2024, the reference range for this assay has changed. There may be differences in the flagging of prior results with similar values performed with this method.    INR 12/07/2024 1.01  0.85 - 1.15 Final    aPTT 12/07/2024 29  22 - 38 Seconds Final    ALT 12/07/2024 12  0 - 50 U/L Final    AST 12/07/2024 19  0 - 45 U/L Final    Creatinine 12/07/2024 0.61  0.51 - 0.95 mg/dL Final    GFR Estimate 12/07/2024 >90  >60 mL/min/1.73m2 Final    eGFR calculated using 2021 CKD-EPI equation.  eGFR calculated using 2021 CKD-EPI equation.    Hemoglobin 12/07/2024 10.8 (L)  11.7 - 15.7 g/dL Final    Platelet Count 12/07/2024 217  150 - 450 10e3/uL Final    WBC Count 12/07/2024 9.8  4.0 - 11.0 10e3/uL Final    RBC Count 12/07/2024 3.74 (L)  3.80 - 5.20 10e6/uL Final    Hemoglobin 12/07/2024 10.8 (L)  11.7 - 15.7 g/dL Final    Hematocrit 12/07/2024 32.2 (L)  35.0 - 47.0 % Final    MCV 12/07/2024 86  78 - 100 fL Final    MCH 12/07/2024 28.9  26.5 - 33.0 pg Final    MCHC 12/07/2024 33.5  31.5 - 36.5 g/dL  Final    RDW 12/07/2024 13.3  10.0 - 15.0 % Final    Platelet Count 12/07/2024 217  150 - 450 10e3/uL Final    % Neutrophils 12/07/2024 74  % Final    % Lymphocytes 12/07/2024 16  % Final    % Monocytes 12/07/2024 8  % Final    % Eosinophils 12/07/2024 1  % Final    % Basophils 12/07/2024 1  % Final    % Immature Granulocytes 12/07/2024 1  % Final    NRBCs per 100 WBC 12/07/2024 0  <1 /100 Final    Absolute Neutrophils 12/07/2024 7.2  1.6 - 8.3 10e3/uL Final    Absolute Lymphocytes 12/07/2024 1.6  0.8 - 5.3 10e3/uL Final    Absolute Monocytes 12/07/2024 0.7  0.0 - 1.3 10e3/uL Final    Absolute Eosinophils 12/07/2024 0.1  0.0 - 0.7 10e3/uL Final    Absolute Basophils 12/07/2024 0.1  0.0 - 0.2 10e3/uL Final    Absolute Immature Granulocytes 12/07/2024 0.1  <=0.4 10e3/uL Final    Absolute NRBCs 12/07/2024 0.0  10e3/uL Final    Hold Specimen 12/07/2024 [facility]   Final    ALT 12/08/2024 10  0 - 50 U/L Final    AST 12/08/2024 17  0 - 45 U/L Final    Creatinine 12/08/2024 0.61  0.51 - 0.95 mg/dL Final    GFR Estimate 12/08/2024 >90  >60 mL/min/1.73m2 Final    eGFR calculated using 2021 CKD-EPI equation.    Hemoglobin 12/08/2024 10.3 (L)  11.7 - 15.7 g/dL Final    Platelet Count 12/08/2024 221  150 - 450 10e3/uL Final   Lab Requisition on 12/06/2024   Component Date Value Ref Range Status    Rubella Rosaura IgG Instrument Value 12/06/2024 2.81  <0.90 Index Final    Rubella Antibody IgG 12/06/2024 Positive   Final    Suggests previous exposure or immunization and probable immunity.    Sodium 12/06/2024 138  135 - 145 mmol/L Final    Potassium 12/06/2024 3.9  3.4 - 5.3 mmol/L Final    Carbon Dioxide (CO2) 12/06/2024 25  22 - 29 mmol/L Final    Anion Gap 12/06/2024 7  7 - 15 mmol/L Final    Urea Nitrogen 12/06/2024 5.0 (L)  6.0 - 20.0 mg/dL Final    Creatinine 12/06/2024 0.63  0.51 - 0.95 mg/dL Final    GFR Estimate 12/06/2024 >90  >60 mL/min/1.73m2 Final    eGFR calculated using 2021 CKD-EPI equation.    Calcium 12/06/2024 8.4 (L)   8.8 - 10.4 mg/dL Final    Reference intervals for this test were updated on 7/16/2024 to reflect our healthy population more accurately. There may be differences in the flagging of prior results with similar values performed with this method. Those prior results can be interpreted in the context of the updated reference intervals.    Chloride 12/06/2024 106  98 - 107 mmol/L Final    Glucose 12/06/2024 102 (H)  70 - 99 mg/dL Final    Alkaline Phosphatase 12/06/2024 76  40 - 150 U/L Final    AST 12/06/2024 20  0 - 45 U/L Final    ALT 12/06/2024 12  0 - 50 U/L Final    Protein Total 12/06/2024 6.0 (L)  6.4 - 8.3 g/dL Final    Albumin 12/06/2024 3.4 (L)  3.5 - 5.2 g/dL Final    Bilirubin Total 12/06/2024 0.2  <=1.2 mg/dL Final    Total Protein Urine mg/dL 12/06/2024 <6.0    mg/dL Final    The reference ranges have not been established in urine protein. The results should be integrated into the clinical context for interpretation.    Total Protein Urine mg/mg Creat 12/06/2024    Final    Unable to calculate, urine creatinine or protein is outside the detectable limits.    Creatinine Urine mg/dL 12/06/2024 20.2  mg/dL Final    The reference ranges have not been established in urine creatinine. The results should be integrated into the clinical context for interpretation.    Culture 12/06/2024 No Growth   Final    Hepatitis B Surface Antigen 12/06/2024 Nonreactive  Nonreactive Final    HIV Antigen Antibody Combo 12/06/2024 Nonreactive  Nonreactive Final    Negative HIV-1 p24 antigen and HIV-1/2 antibody screening test results usually indicate the absence of HIV-1 and HIV-2 infection. However, such negative results do not rule-out acute HIV infection.  If acute HIV-1 or HIV-2 infection is suspected, detection of HIV-1 or HIV-2 RNA  is recommended. This result is obtained using the Roche Elecsys HIV Duo method on the wolfgang [id number] immunoassay analyzer.    Hepatitis C Antibody 12/06/2024 Nonreactive  Nonreactive Final    A  nonreactive screening test result does not exclude the possibility of exposure to or infection with HCV. Nonreactive screening test results in individuals with prior exposure to HCV may be due to antibody levels below the limit of detection of this assay or lack of reactivity to the HCV antigens used in this assay. Patients with recent HCV infections (<3 months from time of exposure) may have false-negative HCV antibody results due to the time needed for seroconversion (average of 8 to 9 weeks).    Treponema Antibody Total 12/06/2024 Nonreactive  Nonreactive Final    Uric Acid 12/06/2024 3.2  2.4 - 5.7 mg/dL Final    WBC Count 12/06/2024 8.1  4.0 - 11.0 10e3/uL Final    RBC Count 12/06/2024 3.79 (L)  3.80 - 5.20 10e6/uL Final    Hemoglobin 12/06/2024 11.1 (L)  11.7 - 15.7 g/dL Final    Hematocrit 12/06/2024 33.9 (L)  35.0 - 47.0 % Final    MCV 12/06/2024 89  78 - 100 fL Final    MCH 12/06/2024 29.3  26.5 - 33.0 pg Final    MCHC 12/06/2024 32.7  31.5 - 36.5 g/dL Final    RDW 12/06/2024 13.5  10.0 - 15.0 % Final    Platelet Count 12/06/2024 228  150 - 450 10e3/uL Final    % Neutrophils 12/06/2024 65  % Final    % Lymphocytes 12/06/2024 22  % Final    % Monocytes 12/06/2024 9  % Final    % Eosinophils 12/06/2024 3  % Final    % Basophils 12/06/2024 1  % Final    % Immature Granulocytes 12/06/2024 1  % Final    NRBCs per 100 WBC 12/06/2024 0  <1 /100 Final    Absolute Neutrophils 12/06/2024 5.2  1.6 - 8.3 10e3/uL Final    Absolute Lymphocytes 12/06/2024 1.8  0.8 - 5.3 10e3/uL Final    Absolute Monocytes 12/06/2024 0.7  0.0 - 1.3 10e3/uL Final    Absolute Eosinophils 12/06/2024 0.2  0.0 - 0.7 10e3/uL Final    Absolute Basophils 12/06/2024 0.0  0.0 - 0.2 10e3/uL Final    Absolute Immature Granulocytes 12/06/2024 0.1  <=0.4 10e3/uL Final    Absolute NRBCs 12/06/2024 0.0  10e3/uL Final    ABO/RH(D) 12/06/2024 A POS   Final    Antibody Screen 12/06/2024 Negative  Negative Final    SPECIMEN EXPIRATION DATE 12/06/2024  [id number]   Final   Admission on 10/09/2024, Discharged on 10/09/2024   Component Date Value Ref Range Status    Trichomonas 10/09/2024 Absent  Absent Final    Yeast 10/09/2024 Absent  Absent Final    Clue Cells 10/09/2024 Present (A)  Absent Final    WBCs/high power field 10/09/2024 1+ (A)  None Final    Color Urine 10/09/2024 Colorless  Colorless, Straw, Light Yellow, Yellow Final    Appearance Urine 10/09/2024 Clear  Clear Final    Glucose Urine 10/09/2024 Negative  Negative mg/dL Final    Bilirubin Urine 10/09/2024 Negative  Negative Final    Ketones Urine 10/09/2024 Negative  Negative mg/dL Final    Specific Gravity Urine 10/09/2024 1.003  1.001 - 1.030 Final    Blood Urine 10/09/2024 0.1 mg/dL (A)  Negative Final    pH Urine 10/09/2024 7.0  5.0 - 7.0 Final    Protein Albumin Urine 10/09/2024 Negative  Negative mg/dL Final    Urobilinogen Urine 10/09/2024 <2.0  <2.0 mg/dL Final    Nitrite Urine 10/09/2024 Negative  Negative Final    Leukocyte Esterase Urine 10/09/2024 Negative  Negative Final    RBC Urine 10/09/2024 <1  <=2 /HPF Final    WBC Urine 10/09/2024 1  <=5 /HPF Final    Squamous Epithelials Urine 10/09/2024 1  <=1 /HPF Final   Admission on 08/03/2024, Discharged on 08/03/2024   Component Date Value Ref Range Status    Ventricular Rate 08/03/2024 96  BPM Final    Atrial Rate 08/03/2024 96  BPM Final    MS Interval 08/03/2024 134  ms Final    QRS Duration 08/03/2024 78  ms Final    QT 08/03/2024 378  ms Final    QTc 08/03/2024 477  ms Final    P Axis 08/03/2024 71  degrees Final    R AXIS 08/03/2024 78  degrees Final    T Axis 08/03/2024 69  degrees Final    Interpretation ECG 08/03/2024    Final                    Value:Sinus rhythm  Normal ECG  No previous ECGs available  Confirmed by SEE ED PROVIDER NOTE FOR, ECG INTERPRETATION ([id number]),  MuseAdmin, MuseAdmin ([id number]) on 8/12/2024 1:56:10 PM      INR 08/03/2024 1.06  0.85 - 1.15 Final    Sodium 08/03/2024 138  135 - 145 mmol/L Final     Potassium 08/03/2024 4.0  3.4 - 5.3 mmol/L Final    Chloride 08/03/2024 106  98 - 107 mmol/L Final    Carbon Dioxide (CO2) 08/03/2024 22  22 - 29 mmol/L Final    Anion Gap 08/03/2024 10  7 - 15 mmol/L Final    Urea Nitrogen 08/03/2024 8.1  6.0 - 20.0 mg/dL Final    Creatinine 08/03/2024 0.61  0.51 - 0.95 mg/dL Final    GFR Estimate 08/03/2024 >90  >60 mL/min/1.73m2 Final    eGFR calculated using 2021 CKD-EPI equation.    Calcium 08/03/2024 8.9  8.8 - 10.4 mg/dL Final    Reference intervals for this test were updated on 7/16/2024 to reflect our healthy population more accurately. There may be differences in the flagging of prior results with similar values performed with this method. Those prior results can be interpreted in the context of the updated reference intervals.    Glucose 08/03/2024 97  70 - 99 mg/dL Final    Troponin T, High Sensitivity 08/03/2024 <6  <=14 ng/L Final    Either a High Sensitivity Troponin T baseline (0 hours) value = 100 ng/L, or an increase in High Sensitivity Troponin T = 7 ng/L at 2 hours compared to 0 hours (2-0 hours), suggests myocardial injury, and urgent clinical attention is required.    If the 2-0 hours increase is <7 ng/L, a High Sensitivity Troponin T result above gender-specific reference ranges warrants further evaluation.   Recommendations for further evaluation include correlation with clinical decision-making tool (e.g., HEART), a 3rd High Sensitivity Troponin T test 2 hours after the 2nd (a 20% change from baseline would represent concern), admission for observation, close PCC/cardiology follow-up, or urgent outpatient provocative testing.    Magnesium 08/03/2024 1.7  1.7 - 2.3 mg/dL Final    TSH 08/03/2024 0.50  0.30 - 4.20 uIU/mL Final    Alcohol ethyl 08/03/2024 <0.01  <=0.01 g/dL Final    N terminal Pro BNP Inpatient 08/03/2024 86  0 - 450 pg/mL Final    Reference range shown and results flagged as abnormal are suggested inpatient cut points for confirming diagnosis  if CHF in an acute setting. Establishing a baseline value for each individual patient is useful for follow-up. An inpatient or emergency department NT-proPBNP <300 pg/mL effectively rules out acute CHF, with 99% negative predictive value.    The outpatient non-acute reference range for ruling out CHF is:  0-125 pg/mL (age 18 to less than 75)  0-450 pg/mL (age 75 yrs and older)     Color Urine 08/03/2024 Colorless  Colorless, Straw, Light Yellow, Yellow Final    Appearance Urine 08/03/2024 Clear  Clear Final    Glucose Urine 08/03/2024 Negative  Negative mg/dL Final    Bilirubin Urine 08/03/2024 Negative  Negative Final    Ketones Urine 08/03/2024 Negative  Negative mg/dL Final    Specific Gravity Urine 08/03/2024 1.004  1.001 - 1.030 Final    Blood Urine 08/03/2024 Negative  Negative Final    pH Urine 08/03/2024 6.5  5.0 - 7.0 Final    Protein Albumin Urine 08/03/2024 Negative  Negative mg/dL Final    Urobilinogen Urine 08/03/2024 <2.0  <2.0 mg/dL Final    Nitrite Urine 08/03/2024 Negative  Negative Final    Leukocyte Esterase Urine 08/03/2024 Negative  Negative Final    Bacteria Urine 08/03/2024 Few (A)  None Seen /HPF Final    RBC Urine 08/03/2024 <1  <=2 /HPF Final    WBC Urine 08/03/2024 <1  <=5 /HPF Final    Squamous Epithelials Urine 08/03/2024 1  <=1 /HPF Final    Influenza A PCR 08/03/2024 Negative  Negative Final    Influenza B PCR 08/03/2024 Negative  Negative Final    RSV PCR 08/03/2024 Negative  Negative Final    SARS CoV2 PCR 08/03/2024 Negative  Negative Final    NEGATIVE: SARS-CoV-2 (COVID-19) RNA not detected, presumed negative.    D-Dimer Quantitative 08/03/2024 <0.27  0.00 - 0.50 ug/mL FEU Final    WBC Count 08/03/2024 9.7  4.0 - 11.0 10e3/uL Final    RBC Count 08/03/2024 4.32  3.80 - 5.20 10e6/uL Final    Hemoglobin 08/03/2024 13.0  11.7 - 15.7 g/dL Final    Hematocrit 08/03/2024 38.4  35.0 - 47.0 % Final    MCV 08/03/2024 89  78 - 100 fL Final    MCH 08/03/2024 30.1  26.5 - 33.0 pg Final    MCHC  08/03/2024 33.9  31.5 - 36.5 g/dL Final    RDW 08/03/2024 12.4  10.0 - 15.0 % Final    Platelet Count 08/03/2024 255  150 - 450 10e3/uL Final    % Neutrophils 08/03/2024 77  % Final    % Lymphocytes 08/03/2024 16  % Final    % Monocytes 08/03/2024 7  % Final    % Eosinophils 08/03/2024 0  % Final    % Basophils 08/03/2024 1  % Final    % Immature Granulocytes 08/03/2024 0  % Final    NRBCs per 100 WBC 08/03/2024 0  <1 /100 Final    Absolute Neutrophils 08/03/2024 7.5  1.6 - 8.3 10e3/uL Final    Absolute Lymphocytes 08/03/2024 1.5  0.8 - 5.3 10e3/uL Final    Absolute Monocytes 08/03/2024 0.7  0.0 - 1.3 10e3/uL Final    Absolute Eosinophils 08/03/2024 0.0  0.0 - 0.7 10e3/uL Final    Absolute Basophils 08/03/2024 0.1  0.0 - 0.2 10e3/uL Final    Absolute Immature Granulocytes 08/03/2024 0.0  <=0.4 10e3/uL Final    Absolute NRBCs 08/03/2024 0.0  10e3/uL Final    Amphetamines Urine 08/03/2024 Screen Negative  Screen Negative Final    Cutoff for a negative amphetamine is less than 500 ng/mL.    Barbituates Urine 08/03/2024 Screen Negative  Screen Negative Final    Cutoff for a negative barbiturate is less than 200 ng/mL.    Benzodiazepine Urine 08/03/2024 Screen Negative  Screen Negative Final    Cutoff for a negative benzodiazepine is less than 100 ng/mL.    Cannabinoids Urine 08/03/2024 Screen Negative  Screen Negative Final    Cutoff for a negative cannabinoid is less than 50 ng/mL.    Cocaine Urine 08/03/2024 Screen Negative  Screen Negative Final    Cutoff for a negative cocaine is less than 300 ng/mL.    Fentanyl Qual Urine 08/03/2024 Screen Negative  Screen Negative Final    Cutoff for negative fentanyl is less than 5 ng/mL.    Opiates Urine 08/03/2024 Screen Negative  Screen Negative Final    Cutoff for a negative opiate is less than 300 ng/mL.    PCP Urine 08/03/2024 Screen Negative  Screen Negative Final    Cutoff for a negative PCP is less than 25 ng/mL.   Lab Requisition on 07/12/2024   Component Date Value Ref  Range Status    Culture 2024 No Growth   Final     GBS unknown  Pertinent Radiology:       Impression/Plan:  1. IUP at 32w5d weeks, cHTN, HA  -HA has persisted despite multiple medications  -Labs unremarkable, BP's overall mild-range.  Isolated severe did not require acute treatment as it was not sustained on repeat  -Discussed that uncontrolled HA can be considered a severe feature of pre-eclampsia  -Recommended admission now for HA management.  She also has benadryl/reglan ordered as a HA option.  -Discussed if able to control HA then reassuring against pre-eclampsia, if unable to control HA then may consider neurology vs MFM consult tomorrow for further work-up of her HA  -Discussed if additional interventions to control HA fail and no other explanation of HA then may be consistent with severe features, in that case delivery may be indicated and would plan to start magnesium  -Will keep NPO at midnight     Provider: Jing Garcia MD    Date: 2024  Time: 11:39 PM    NIKOLAS Uriostegui 1995, MRN [id number]

## 2024-12-30 NOTE — PROVIDER NOTIFICATION
Dr. Herrera updated on low BP. Order for 500ml LR bolus and hold night time (2100) nifedipine.    12/30/24 1547   Vital Signs   Oximeter Heart Rate 78 bpm   BP 99/56

## 2024-12-30 NOTE — PROGRESS NOTES
Data: [facility] presented to [facility]: 2024  5:25 PM.  Reason for maternal/fetal assessment is hypertension disorder of pregnancy. Patient reports right upper forehead pain for the last 4 days and right lower abdomen pain 3/10 for the last 2 days. Swelling in feet is mild and patella reflexes with assessment are brisk. No clonus. Patient denies uterine contractions, leaking of vaginal fluid/rupture of membranes, vaginal bleeding, pelvic pressure, nausea, vomiting, significant edema. Patient reports fetal movement is normal. Patient is a 32w5d .  Prenatal record reviewed. Pregnancy has been complicated by hypertension. Pt currently on Nifedipine 30 mg BID . Pt normally takes at 9am and 6 pm but took early today at 3 pm since she took BP at home and it was 160/100. Recheck 2 hours after taking BP was 140/90 which was about an hour after she took her Nifedipine today.   Pt reports she has been here multiple times for rule out pre-eclampsia.  Hx of delivering last baby at 33w2d -had pre-eclampsia last pregnancy. Reports she did get betamethasone already this pregnancy 2 doses.      Vital signs  See Vitals Tab . Mostly hovering in 140's/90's so far. Support person is not present.     Action: Verbal consent for EFM. Triage assessment completed.     Response: Patient verbalized agreement with plan. Dr. Garcia updated. See Hypertension order set orders.

## 2024-12-30 NOTE — PROGRESS NOTES
Full note to follow     MFM consult today for low grade headache in the setting of chronic HTN. Labs normal. Last growth Dec 7, will get BPP today.     Mary Herrera MD, FACOG, [facility]  12/30/2024 9:40 AM

## 2024-12-30 NOTE — PLAN OF CARE
Dr. Herrera on unit to discuss POC. Reviewed fetal tracing, appropriate for gestational age. MD would like an M consult. OK to re-order home meds. Discussed headache per patient report, but no other s/sx of pre-e. OK for clear liquids.

## 2024-12-30 NOTE — PROGRESS NOTES
Pt reports she has not used any suboxone in the last month. Was taking for pain. Declines any history of recreational drug use.

## 2024-12-30 NOTE — PLAN OF CARE
Problem: Adult Inpatient Plan of Care  Goal: Plan of Care Review  Description: The Plan of Care Review/Shift note should be completed every shift.  The Outcome Evaluation is a brief statement about your assessment that the patient is improving, declining, or no change.  This information will be displayed automatically on your shift  note.  Outcome: Progressing  Flowsheets (Taken 12/30/2024 0433)  Outcome Evaluation: pt will communicate questions, concerns and/or changes.  Plan of Care Reviewed With: patient  Overall Patient Progress: improving     Problem: Hypertensive Disorders in Pregnancy  Goal: Patient-Fetal Stabilization  Outcome: Progressing  Intervention: Optimize Blood Pressure and Fluid Status  Recent Flowsheet Documentation  Taken 12/29/2024 2321 by Lilly Guadarrama RN  Fluid/Electrolyte Management: fluids provided  Intervention: Monitor and Manage Symptom Progression  Recent Flowsheet Documentation  Taken 12/29/2024 2321 by Lilly Guadarrama RN  Seizure Precautions: clutter-free environment maintained     Problem: Pain Acute  Goal: Optimal Pain Control and Function  Outcome: Progressing  Intervention: Optimize Psychosocial Wellbeing  Recent Flowsheet Documentation  Taken 12/29/2024 2321 by Lilly Guadarrama RN  Diversional Activities: smartphone  Intervention: Develop Pain Management Plan  Recent Flowsheet Documentation  Taken 12/29/2024 2321 by Lilly Guadarrama RN  Pain Management Interventions:   care clustered   pain management plan reviewed with patient/caregiver   pillow support provided   quiet environment facilitated   relaxation techniques promoted   repositioned   rest  Intervention: Prevent or Manage Pain  Recent Flowsheet Documentation  Taken 12/29/2024 2321 by Lilly Guadarrama RN  Sensory Stimulation Regulation:   care clustered   lighting decreased   quiet environment promoted   Goal Outcome Evaluation:      Plan of Care Reviewed With: patient    Overall Patient Progress: improvingOverall Patient  "Progress: improving    Outcome Evaluation: pt will communicate questions, concerns and/or changes.    VSS, , moderate variability possible VD but not connected. Uterine irritability present. Sleeping between cares, denies pain and would sometimes say \"I don't know I'm sleeping\" and would return to sleep. No calls from pt. Saline lock in place, has been NPO since midnight. Stable NAD.  "

## 2024-12-30 NOTE — PROGRESS NOTES
Updated Dr. Garcia on BP registering at 171/110 right after talking with pt about potential plans for the night and what pt labs are looked for in pre-eclampsia, results of labs. Pt talked during BP so retook BP and is it was 159/109.  Oxycodone given to pt. Pt still prefers not to get IV unless she is staying in hospital so asked Dr. Garcia if we could do PRN oral Nifedipine in case it is needed.  Plan is if pt headache goes away, she can get a ride home, if not, then she will be observed in hospital and at this point pt would agree to IV.

## 2024-12-30 NOTE — PROGRESS NOTES
Antepartum Progress Note    Shital Tamayo MRN# [id number]   YOB: 1995 Age: 29 year old   Date of Admission: 2024             Assessment and Plan:   Shital Tamayo is a 29 year old  at 32w6d who is admitted on 2024 for chronic hypertension with headache.    -Chronic HTN    -Continue home meds of nifedipine 30 mg XL BID. Can consider switching to labetalol as headache can be side effect of nifedipine if not being delivered due to headache.   -MFM consult to confirm headache is not a severe feature. If not, can have neurology consult potentially as inpatient or outpatient.   -Will get growth and BPP here today, was scheduled for this week to start.   -Headache   -Pt declined IV yesterday. Was given fioricet, flexeril, vistaril, and oxycodone for headache. Reglan and benadryl ordered IV for continued headache and now has IV.  -Fetal status:  -Monitoring: continuous until decision made on severe feature  -Betamethasone: s/p doses previous admission  -Magnesium for neuroprotection: , if severe feature will need to restart.  -GBS ordered          Attestation:  Amount of time performed on this daily note: 20 minutes.  Total time: 40 minutes in coordination of care with [facility], nursing on cares.    Lee Ann Herrera MD           Subjective:   Still has right sided headache this morning, better than before but still present. Confirms not in the location of her usual headaches. Is hypotensive this morning.          Physical Exam:   Vitals were reviewed  Temp: 98.6  F (37  C) Temp src: Oral BP: 103/56     Resp: 16 SpO2: 100 %      Wt Readings from Last 4 Encounters:   24 94.1 kg (207 lb 8 oz)   24 86.7 kg (191 lb 1 oz)   24 91.2 kg (201 lb)   10/09/24 76.7 kg (169 lb)     Blood pressure range: Systolic (24hrs), Av , Min:87 , Max:171     Blood pressure range: Diastolic (24hrs), Av, Min:45, Max:110      Intake/Output Summary (Last 24 hours) at 2024  1218  Last data filed at 12/30/2024 1200  Gross per 24 hour   Intake 3030 ml   Output 1750 ml   Net 1280 ml       Gen: Alert, no acute distress  Abd: Gravid, nontender          NST and Data:   NST Procedure note    Indication: CHTN    Procedure: Fetal non-stress test  Results: Non-Reactive    Recent Results (from the past 24 hours)   CBC with platelets    Collection Time: 12/29/24  6:53 PM   Result Value Ref Range    WBC Count 7.9 4.0 - 11.0 10e3/uL    RBC Count 3.64 (L) 3.80 - 5.20 10e6/uL    Hemoglobin 10.1 (L) 11.7 - 15.7 g/dL    Hematocrit 31.3 (L) 35.0 - 47.0 %    MCV 86 78 - 100 fL    MCH 27.7 26.5 - 33.0 pg    MCHC 32.3 31.5 - 36.5 g/dL    RDW 13.3 10.0 - 15.0 %    Platelet Count 176 150 - 450 10e3/uL   Comprehensive Metabolic Panel    Collection Time: 12/29/24  6:53 PM   Result Value Ref Range    Sodium 136 135 - 145 mmol/L    Potassium 3.5 3.4 - 5.3 mmol/L    Carbon Dioxide (CO2) 22 22 - 29 mmol/L    Anion Gap 10 7 - 15 mmol/L    Urea Nitrogen 5.8 (L) 6.0 - 20.0 mg/dL    Creatinine 0.57 0.51 - 0.95 mg/dL    GFR Estimate >90 >60 mL/min/1.73m2    Calcium 8.7 (L) 8.8 - 10.4 mg/dL    Chloride 104 98 - 107 mmol/L    Glucose 104 (H) 70 - 99 mg/dL    Alkaline Phosphatase 91 40 - 150 U/L    AST 17 0 - 45 U/L    ALT 10 0 - 50 U/L    Protein Total 5.7 (L) 6.4 - 8.3 g/dL    Albumin 3.3 (L) 3.5 - 5.2 g/dL    Bilirubin Total 0.2 <=1.2 mg/dL   Adult Type and Screen    Collection Time: 12/29/24  6:53 PM   Result Value Ref Range    ABO/RH(D) A POS     Antibody Screen Negative Negative    SPECIMEN EXPIRATION DATE [id number]    Protein  random urine    Collection Time: 12/29/24  7:02 PM   Result Value Ref Range    Total Protein Urine mg/dL 7.9   mg/dL    Total Protein Urine mg/mg Creat 0.12 0.00 - 0.20 mg/mg Cr    Creatinine Urine mg/dL 67.9 mg/dL     No results found for this visit on 12/29/24.    All laboratory and imaging data in the past 24 hours reviewed

## 2024-12-30 NOTE — PLAN OF CARE
Call from Dr. Jiang at [facility]. The U/S showed a new diagnosis of fetal growth restriction, with infant in the 2%. BP 8/8. MD will be by this afternoon to discuss findings with the patient, but she is OK to eat a regular diet. Dr. Herrera notified. Plan for patient to stay until [facility] can discuss findings with the patient.

## 2024-12-30 NOTE — PLAN OF CARE
"  Problem: Adult Inpatient Plan of Care  Goal: Plan of Care Review  Description: The Plan of Care Review/Shift note should be completed every shift.  The Outcome Evaluation is a brief statement about your assessment that the patient is improving, declining, or no change.  This information will be displayed automatically on your shift  note.  Outcome: Progressing  Flowsheets (Taken 12/30/2024 1727)  Outcome Evaluation: Persistant HA, rating pain 3/10. Medications given for pain-see MAR. Heat pack given for pain. Blood pressures lower than normal, order to hold nifidipine. Fluid bolus given at 1600-500ml. Dr. Jiang in to speak with patient about plan of care, see note. GBS collected today-pending result.  Plan of Care Reviewed With: patient  Overall Patient Progress: improving  Goal: Patient-Specific Goal (Individualized)  Description: You can add care plan individualizations to a care plan. Examples of Individualization might be:  \"Parent requests to be called daily at 9am for status\", \"I have a hard time hearing out of my right ear\", or \"Do not touch me to wake me up as it startles  me\".  Outcome: Progressing  Goal: Absence of Hospital-Acquired Illness or Injury  Outcome: Progressing  Intervention: Identify and Manage Fall Risk  Recent Flowsheet Documentation  Taken 12/30/2024 1615 by Niyah Jarquin RN  Safety Promotion/Fall Prevention: lighting adjusted  Intervention: Prevent Skin Injury  Recent Flowsheet Documentation  Taken 12/30/2024 1615 by Niyah Jarquin RN  Body Position: position changed independently  Intervention: Prevent and Manage VTE (Venous Thromboembolism) Risk  Recent Flowsheet Documentation  Taken 12/30/2024 1615 by Niyah Jarquin RN  VTE Prevention/Management: SCDs off (sequential compression devices)  Intervention: Prevent Infection  Recent Flowsheet Documentation  Taken 12/30/2024 1615 by Niyah Jarquin RN  Infection Prevention:   rest/sleep promoted   hand hygiene promoted   single " patient room provided  Goal: Optimal Comfort and Wellbeing  Outcome: Progressing  Intervention: Monitor Pain and Promote Comfort  Recent Flowsheet Documentation  Taken 12/30/2024 1720 by Niyah Jarquin RN  Pain Management Interventions: heat applied  Taken 12/30/2024 1547 by Niyah Jarquin RN  Pain Management Interventions:   declines   rest  Intervention: Provide Person-Centered Care  Recent Flowsheet Documentation  Taken 12/30/2024 1615 by Niyah Jarquin RN  Trust Relationship/Rapport:   care explained   questions answered   choices provided  Goal: Readiness for Transition of Care  Outcome: Progressing     Problem: Hypertensive Disorders in Pregnancy  Goal: Patient-Fetal Stabilization  Outcome: Progressing     Problem: Pain Acute  Goal: Optimal Pain Control and Function  Outcome: Progressing  Intervention: Develop Pain Management Plan  Recent Flowsheet Documentation  Taken 12/30/2024 1720 by Niyah Jarquin RN  Pain Management Interventions: heat applied  Taken 12/30/2024 1547 by Niyah Jarquin RN  Pain Management Interventions:   declines   rest   Goal Outcome Evaluation:      Plan of Care Reviewed With: patient    Overall Patient Progress: improvingOverall Patient Progress: improving    Outcome Evaluation: Persistant HA, rating pain 3/10. Medications given for pain-see MAR. Heat pack given for pain. Blood pressures lower than normal, order to hold nifidipine. Fluid bolus given at 1600-500ml. Dr. Jiang in to speak with patient about plan of care, see note. GBS collected today-pending result.

## 2024-12-30 NOTE — PROGRESS NOTES
"OB Triage:    S: Shital is a 29 year old  who presents tonight for HA.  She has known cHTn and is on labetalol and procardia, she has been on these since <20 weeks.    She has a history of migraines but feels like this HA is different, not resolved, she did take excedrin at home.    O:  BP (!) 139/95   Temp 98.6  F (37  C) (Oral)   Resp 18   Ht 1.676 m (5' 6\")   Wt 93.2 kg (205 lb 8 oz)   BMI 33.17 kg/m      General: NAD    A/P:  IUP at 32 5/7  cHTN on oral meds  HA    Will check labs to evaluate for pre-eclampsia.  BP's have been mild-range thus far.    Added additional options for HA (esgic, flexeril, oxycodone, benadryl/reglan)  Discussed unrelenting HA can be a severe feature of pre-eclampsia, but will first attempt to control her HA  If unable to control HA then would plan to observe in hospital  She did receive betamethasone during prior admission  First delivery was via , she is planning to deliver via [facility] this pregnancy    MD Tennille  "

## 2024-12-31 ENCOUNTER — TELEPHONE (OUTPATIENT)
Dept: MATERNAL FETAL MEDICINE | Facility: HOSPITAL | Age: 29
End: 2024-12-31
Payer: COMMERCIAL

## 2024-12-31 ENCOUNTER — MEDICAL CORRESPONDENCE (OUTPATIENT)
Dept: HEALTH INFORMATION MANAGEMENT | Facility: CLINIC | Age: 29
End: 2024-12-31
Payer: COMMERCIAL

## 2024-12-31 VITALS
SYSTOLIC BLOOD PRESSURE: 107 MMHG | DIASTOLIC BLOOD PRESSURE: 59 MMHG | WEIGHT: 207.5 LBS | OXYGEN SATURATION: 99 % | HEART RATE: 58 BPM | TEMPERATURE: 98.9 F | BODY MASS INDEX: 33.35 KG/M2 | HEIGHT: 66 IN | RESPIRATION RATE: 14 BRPM

## 2024-12-31 DIAGNOSIS — O36.5990 FETAL GROWTH RESTRICTION ANTEPARTUM: Primary | ICD-10-CM

## 2024-12-31 LAB
ALBUMIN SERPL BCG-MCNC: 3 G/DL (ref 3.5–5.2)
ALP SERPL-CCNC: 90 U/L (ref 40–150)
ALT SERPL W P-5'-P-CCNC: 11 U/L (ref 0–50)
ANION GAP SERPL CALCULATED.3IONS-SCNC: 10 MMOL/L (ref 7–15)
AST SERPL W P-5'-P-CCNC: 15 U/L (ref 0–45)
BILIRUB SERPL-MCNC: 0.2 MG/DL
BUN SERPL-MCNC: 7 MG/DL (ref 6–20)
CALCIUM SERPL-MCNC: 8.5 MG/DL (ref 8.8–10.4)
CHLORIDE SERPL-SCNC: 106 MMOL/L (ref 98–107)
CREAT SERPL-MCNC: 0.57 MG/DL (ref 0.51–0.95)
EGFRCR SERPLBLD CKD-EPI 2021: >90 ML/MIN/1.73M2
ERYTHROCYTE [DISTWIDTH] IN BLOOD BY AUTOMATED COUNT: 13.3 % (ref 10–15)
GLUCOSE SERPL-MCNC: 106 MG/DL (ref 70–99)
GP B STREP DNA SPEC QL NAA+PROBE: POSITIVE
HCO3 SERPL-SCNC: 21 MMOL/L (ref 22–29)
HCT VFR BLD AUTO: 32.2 % (ref 35–47)
HGB BLD-MCNC: 10.4 G/DL (ref 11.7–15.7)
MCH RBC QN AUTO: 27.7 PG (ref 26.5–33)
MCHC RBC AUTO-ENTMCNC: 32.3 G/DL (ref 31.5–36.5)
MCV RBC AUTO: 86 FL (ref 78–100)
PLATELET # BLD AUTO: 180 10E3/UL (ref 150–450)
POTASSIUM SERPL-SCNC: 4.1 MMOL/L (ref 3.4–5.3)
PROT SERPL-MCNC: 5.4 G/DL (ref 6.4–8.3)
RBC # BLD AUTO: 3.75 10E6/UL (ref 3.8–5.2)
SODIUM SERPL-SCNC: 137 MMOL/L (ref 135–145)
WBC # BLD AUTO: 8.4 10E3/UL (ref 4–11)

## 2024-12-31 PROCEDURE — 36415 COLL VENOUS BLD VENIPUNCTURE: CPT | Performed by: OBSTETRICS & GYNECOLOGY

## 2024-12-31 PROCEDURE — 250N000013 HC RX MED GY IP 250 OP 250 PS 637: Performed by: OBSTETRICS & GYNECOLOGY

## 2024-12-31 PROCEDURE — 250N000013 HC RX MED GY IP 250 OP 250 PS 637: Performed by: STUDENT IN AN ORGANIZED HEALTH CARE EDUCATION/TRAINING PROGRAM

## 2024-12-31 PROCEDURE — 82040 ASSAY OF SERUM ALBUMIN: CPT | Performed by: OBSTETRICS & GYNECOLOGY

## 2024-12-31 PROCEDURE — 82310 ASSAY OF CALCIUM: CPT | Performed by: OBSTETRICS & GYNECOLOGY

## 2024-12-31 PROCEDURE — 85018 HEMOGLOBIN: CPT | Performed by: OBSTETRICS & GYNECOLOGY

## 2024-12-31 RX ORDER — NIFEDIPINE 30 MG
30 TABLET, EXTENDED RELEASE ORAL DAILY
Qty: 30 TABLET | Refills: 0 | Status: ON HOLD | OUTPATIENT
Start: 2024-12-31 | End: 2025-01-07

## 2024-12-31 RX ADMIN — ESCITALOPRAM OXALATE 20 MG: 10 TABLET ORAL at 09:51

## 2024-12-31 RX ADMIN — PRENATAL VIT W/ FE FUMARATE-FA TAB 27-0.8 MG 1 TABLET: 27-0.8 TAB at 09:51

## 2024-12-31 RX ADMIN — PANTOPRAZOLE SODIUM 40 MG: 40 TABLET, DELAYED RELEASE ORAL at 09:51

## 2024-12-31 ASSESSMENT — ACTIVITIES OF DAILY LIVING (ADL)
ADLS_ACUITY_SCORE: 22

## 2024-12-31 NOTE — PLAN OF CARE
Problem: Hypertensive Disorders in Pregnancy  Goal: Patient-Fetal Stabilization  Outcome: Progressing       Shift Note 24 [id number]   32.6 29 yr old  admit. [employer] & MFM. CHTN, FGR 2%, persistent HA. VSS ex soft bps & P 58. L PIV SL. Neuro consult or . BPP  today. Continue with plan of care.

## 2024-12-31 NOTE — PROGRESS NOTES
"Data: Patient assessed in the [facility] for elevated blood pressures and HA . Cervical exam deferred. Membranes intact. Contractions are not present. Patient has been on unit for observation for Bps and HA. Patient declines to see neurology, states \"she has seen them multiple times in her life and nothing is wrong\".  Discharge reviewed with Dr. Zaman. See MD note. See flowsheets for fetal assessment documentation.     Action: Presumed adequate fetal oxygenation documented. Discharge instructions reviewed. Patient instructed to report change in fetal movement, vaginal leaking of fluid or bleeding, abdominal pain, or any concerns related to the pregnancy to provider/clinic.  Pre-eclampsia education reviewed with patient. Patient verbalized understanding of when to come back to hospital or call clinic.     Clinic appointment made with Dr. Maxwell on Friday, 1/3 at 10 am.   [facility] appointment made on Friday 1/3 at 1445.     Response: Orders to discharge home per . Patient verbalized understanding of education and agreement with plan. Discharged to home at 1205.            "

## 2024-12-31 NOTE — CONSULTS
NEUROLOGY CONSULTATION NOTE     Shital Tamayo,  1995, MRN [id number] Date: 2024     [facility]   Code status:  Full Code   PCP: Siobhan Carter, [phone]      ASSESSMENT & PLAN   Diagnosis code: Headache           Chief Complaint   Patient presents with    Rule Out Pre-eclampsia     Headache for 4 days and high blood pressure readings at home for 4 days. 160/110 and recheck at home 140/90. Right sided pain lower abdomen.         HISTORY OF PRESENT ILLNESS     We have been requested by ? to evaluate Shital Tamayo who is a 29 year old female for headache. Per chart review, the patient has a mild headache and plan is for discharge today. Additional history of chronic HTN. Additional history of migraines, followed in [facility] in the past.     PAST MEDICAL & SURGICAL HISTORY     Medical History  Past Medical History:   Diagnosis Date    ADHD (attention deficit hyperactivity disorder)     Anxiety     Depressive disorder      Surgical History  Past Surgical History:   Procedure Laterality Date    AS RAD RESEC TONSIL/PILLARS Bilateral 2015     SECTION N/A 2022    Procedure:  SECTION;  Surgeon: Becki Nieves MD;  Location: [facility]    DILATION AND CURETTAGE N/A 2019    Procedure: DILATION AND CURETTAGE, UTERUS, USING SUCTION;  Surgeon: Becki Nieves MD;  Location: [facility];  Service: Obstetrics        SOCIAL HISTORY     Social History      FAMILY HISTORY     Reviewed, and family history is not on file.     ALLERGIES     Allergies   Allergen Reactions    Ondansetron Anxiety, Dizziness and Other (See Comments)        REVIEW OF SYSTEMS     {Review of Systems:98406}     HOME & HOSPITAL MEDICATIONS     Prior to Admission Medications  Medications Prior to Admission   Medication Sig Dispense Refill Last Dose/Taking    aspirin-acetaminophen-caffeine (EXCEDRIN MIGRAINE) 250-250-65 MG tablet Take 1 tablet by mouth daily as needed for  headaches.   12/29/2024 at  4:00 PM    escitalopram (LEXAPRO) 20 MG tablet Take 20 mg by mouth daily.   12/29/2024 at  9:00 AM    hydrOXYzine (ATARAX) 50 MG tablet Take 2 tablets (100 mg) by mouth 3 times daily as needed for anxiety 40 tablet 0 12/29/2024 at 12:00 PM    NIFEdipine ER (ADALAT CC) 30 MG 24 hr tablet Take 1 tablet (30 mg) by mouth 2 times daily.   12/29/2024 at  3:00 PM    ADDERALL XR 30 MG 24 hr capsule Take 30 mg by mouth daily.   Unknown       Hospital Medications  Current Facility-Administered Medications   Medication Dose Route Frequency Provider Last Rate Last Admin    docusate sodium (COLACE) capsule 100 mg  100 mg Oral BID Jing Garcia MD        escitalopram (LEXAPRO) tablet 20 mg  20 mg Oral Daily Lee Ann Herrera MD   20 mg at 12/30/24 1126    NIFEdipine ER (ADALAT CC) 24 hr tablet 30 mg  30 mg Oral Daily Lee Ann Herrera MD        pantoprazole (PROTONIX) EC tablet 40 mg  40 mg Oral QAM AC Jing Garcia MD   40 mg at 12/30/24 1126    polyethylene glycol (MIRALAX) Packet 17 g  17 g Oral Daily Jing Garcia MD        prenatal multivitamin w/iron per tablet 1 tablet  1 tablet Oral Daily Jing Garcia MD   1 tablet at 12/30/24 1127    senna-docusate (SENOKOT-S/PERICOLACE) 8.6-50 MG per tablet 1 tablet  1 tablet Oral BID Jing Garcia MD        Or    senna-docusate (SENOKOT-S/PERICOLACE) 8.6-50 MG per tablet 2 tablet  2 tablet Oral BID Jing Garcia MD        sodium chloride (PF) 0.9% PF flush 3 mL  3 mL Intracatheter Q8H Jing Garcia MD   3 mL at 12/30/24 2154        PHYSICAL EXAM     Vital signs  Temp:  [98.3  F (36.8  C)-98.9  F (37.2  C)] 98.9  F (37.2  C)  Pulse:  [58] 58  Resp:  [14-18] 14  BP: ()/(53-78) 107/59  SpO2:  [99 %-100 %] 99 %    Weight:   [unfilled]    General Physical Exam: Patient is alert and oriented x ***. Vital signs were reviewed and are documented in EMR. Neck was ***, no carotid bruit, thyromegaly, JVD or lymphadenopathy  "noted.  Neurological Exam:  Mental status: ***  Speech: ***  Cranial nerves:  ***  Motor: ***  Reflexes: ***  Sensory: ***  Coordination: ***  Gait: ***     DIAGNOSTIC STUDIES     Pertinent Radiology   Radiology Results: {Review Radiology Ip:869214::\"Reviewed and discussed with Radiologist\"}    CT  ***    MRI  ***    Pertinent Labs   Lab Results: {IP Personally Reviewed:026152::\"personally reviewed. \"}  Recent Results (from the past 24 hours)   Comprehensive metabolic panel    Collection Time: 12/31/24  5:57 AM   Result Value Ref Range    Sodium 137 135 - 145 mmol/L    Potassium 4.1 3.4 - 5.3 mmol/L    Carbon Dioxide (CO2) 21 (L) 22 - 29 mmol/L    Anion Gap 10 7 - 15 mmol/L    Urea Nitrogen 7.0 6.0 - 20.0 mg/dL    Creatinine 0.57 0.51 - 0.95 mg/dL    GFR Estimate >90 >60 mL/min/1.73m2    Calcium 8.5 (L) 8.8 - 10.4 mg/dL    Chloride 106 98 - 107 mmol/L    Glucose 106 (H) 70 - 99 mg/dL    Alkaline Phosphatase 90 40 - 150 U/L    AST 15 0 - 45 U/L    ALT 11 0 - 50 U/L    Protein Total 5.4 (L) 6.4 - 8.3 g/dL    Albumin 3.0 (L) 3.5 - 5.2 g/dL    Bilirubin Total 0.2 <=1.2 mg/dL   CBC with platelets    Collection Time: 12/31/24  5:57 AM   Result Value Ref Range    WBC Count 8.4 4.0 - 11.0 10e3/uL    RBC Count 3.75 (L) 3.80 - 5.20 10e6/uL    Hemoglobin 10.4 (L) 11.7 - 15.7 g/dL    Hematocrit 32.2 (L) 35.0 - 47.0 %    MCV 86 78 - 100 fL    MCH 27.7 26.5 - 33.0 pg    MCHC 32.3 31.5 - 36.5 g/dL    RDW 13.3 10.0 - 15.0 %    Platelet Count 180 150 - 450 10e3/uL       Total time spent for face to face visit, reviewing labs/imaging studies, counseling and coordination of care was: {time:71687} More than 50% of this time was spent on counseling and coordination of care.    Dragon software used in the dictation of this note.    aHyley Bowden MD  [facility] [address], [address]  [place] [address]  [phone]            "

## 2024-12-31 NOTE — PROVIDER NOTIFICATION
[id number]    Strip reviewed with Dr. Zaman. FGR 2%, 33 weeks. BPP 12/30 8/8.   Okay to remove patient from monitor.

## 2024-12-31 NOTE — PROGRESS NOTES
Antepartum Progress Note    Shital Tamayo MRN# [id number]   YOB: 1995 Age: 29 year old   Date of Admission: 2024             Assessment and Plan:   Shital Tamayo is a 29 year old  at 33w0d who is admitted on 2024. The patient with a history of chronic hypertension was admitted for observation. She reports resolution of her headache and currently denies any new symptoms, including visual changes, chest pain, or shortness of breath. Blood pressures have remained stable throughout admission, despite the patient not taking her prescribed antihypertensive medications. Fetal growth restriction was noted on prior imaging. No signs of acute maternal or fetal compromise are present.    The patient desires discharge to home. She is advised to resume antihypertensive medications as prescribed. She has been checking her blood pressure prior to taking the antihypertensives with a blood pressure cuff. If blood pressure is low, she has no taken the medication. Follow up with outpatient maternal-fetal medicine and obstetrics as planned for continued monitoring of IUGR and maternal blood pressure, and return immediately to the hospital for any concerning symptoms such as persistent headaches, visual disturbances, abdominal pain, or decreased fetal movement.    Fetal status is reassuring. BPP 2024 was 8/8 done by MFM. FHTs this morning are reassuring with no decelerations noted. The recommended surveillance and management of pregnancies complicated by FGR includes serial assessment of fetal growth (every 3 weeks) in addition to weekly UA Dopplers and  surveillance. Timing of delivery will depend on Doppler findings, amniotic fluid, fetal monitoring, interval fetal growth, and ongoing blood pressure monitoring.     The patient desires discharge to home, and discharge instructions have been provided and reviewed.                  Subjective:   Feels well. Wants to go home.           Physical Exam:   Vitals were reviewed  Temp: 98.9  F (37.2  C) Temp src: Oral BP: 107/59 Pulse: 58   Resp: 14 SpO2: 99 % O2 Device: None (Room air)    Wt Readings from Last 4 Encounters:   24 94.1 kg (207 lb 8 oz)   24 86.7 kg (191 lb 1 oz)   24 91.2 kg (201 lb)   10/09/24 76.7 kg (169 lb)     Blood pressure range: Systolic (24hrs), Av , Min:87 , Max:134     Blood pressure range: Diastolic (24hrs), Av, Min:53, Max:78      Intake/Output Summary (Last 24 hours) at 2024 1049  Last data filed at 2024 0900  Gross per 24 hour   Intake 2240 ml   Output 2000 ml   Net 240 ml       Gen: Alert, no acute distress  Abd: Gravid, nontender          NST and Data:   NST Procedure note    Indication: Chronic HTN, IUGR  Procedure: Fetal non-stress test  Results: Reassuring, no decelerations  Doppler yesterday: 1.03      Recent Results (from the past 24 hours)   Group B strep PCR    Collection Time: 24 12:30 PM    Specimen: Rectovaginal; Swab   Result Value Ref Range    Group B Strep PCR Positive (A) Negative   Comprehensive metabolic panel    Collection Time: 24  5:57 AM   Result Value Ref Range    Sodium 137 135 - 145 mmol/L    Potassium 4.1 3.4 - 5.3 mmol/L    Carbon Dioxide (CO2) 21 (L) 22 - 29 mmol/L    Anion Gap 10 7 - 15 mmol/L    Urea Nitrogen 7.0 6.0 - 20.0 mg/dL    Creatinine 0.57 0.51 - 0.95 mg/dL    GFR Estimate >90 >60 mL/min/1.73m2    Calcium 8.5 (L) 8.8 - 10.4 mg/dL    Chloride 106 98 - 107 mmol/L    Glucose 106 (H) 70 - 99 mg/dL    Alkaline Phosphatase 90 40 - 150 U/L    AST 15 0 - 45 U/L    ALT 11 0 - 50 U/L    Protein Total 5.4 (L) 6.4 - 8.3 g/dL    Albumin 3.0 (L) 3.5 - 5.2 g/dL    Bilirubin Total 0.2 <=1.2 mg/dL   CBC with platelets    Collection Time: 24  5:57 AM   Result Value Ref Range    WBC Count 8.4 4.0 - 11.0 10e3/uL    RBC Count 3.75 (L) 3.80 - 5.20 10e6/uL    Hemoglobin 10.4 (L) 11.7 - 15.7 g/dL    Hematocrit 32.2 (L) 35.0 - 47.0 %    MCV 86 78 - 100  fL    MCH 27.7 26.5 - 33.0 pg    MCHC 32.3 31.5 - 36.5 g/dL    RDW 13.3 10.0 - 15.0 %    Platelet Count 180 150 - 450 10e3/uL     Results for orders placed or performed during the hospital encounter of 12/29/24   Maternal Fetal US Comprehensive Single    Impression    IMPRESSION  ---------------------------------------------------------------------------------------------------------  1. Sofia pregnancy at 32w 6d gestational age.  2. No fetal anomalies commonly detected by ultrasound were identified in the detailed fetal anatomic survey within the limits of prenatal ultrasound, however some views  were suboptimal, as described above.  3. Fetal growth restriction was noted with EFW 2%, AC <1%.  4. The amniotic fluid volume appeared normal.  5. The umbilical artery Dopplers were normal.  6. On transabdominal imaging the cervix appeared long and closed.  7. BPP is reassuring.           All laboratory and imaging data in the past 24 hours reviewed

## 2024-12-31 NOTE — PLAN OF CARE
Problem: Hypertensive Disorders in Pregnancy  Goal: Patient-Fetal Stabilization  Outcome: Progressing     Problem: Pain Acute  Goal: Optimal Pain Control and Function  Intervention: Develop Pain Management Plan  Recent Flowsheet Documentation  Taken 12/31/2024 0034 by Rosmery Zapien RN  Pain Management Interventions:   declines   rest   repositioned   Goal Outcome Evaluation:      Plan of Care Reviewed With: patient    Overall Patient Progress: improvingOverall Patient Progress: improving       Shital's blood pressures have remained stable. She reports that her headache is still there but is now mild (3/10) and has declined pain medication. Plan is for a neuro consult this morning and possible discharge.

## 2024-12-31 NOTE — PROGRESS NOTES
Discussed discharge planning with Dr. Zaman. Patient to make [facility] appointment and clinic appoint for Friday. Patient to be discharged home undelivered after appointments made.

## 2024-12-31 NOTE — TELEPHONE ENCOUNTER
I attempted to call Shital  three times, the number rang rang busy all three times. I wanted to discuss her social media post regarding her medical care at [facility] and that she feels like she's not being heard and concerns about her blood pressures. I had planned to offer the patient the option to speak with one of our [facility] physicians and was planning to encourage her to call her primary OB clinic as well. The patient is scheduled to return to [facility] on 1/3/2025 for follow-up.     Blanca Sun GC on 12/31/2024 at 4:32 PM    
10 feet

## 2024-12-31 NOTE — PROGRESS NOTES
Reactive NST appropriate for gestational age confirmed with Kati CALDERNO RN. Moderate variability, 15x15 accelerations present.

## 2024-12-31 NOTE — PLAN OF CARE
"  Problem: Adult Inpatient Plan of Care  Goal: Plan of Care Review  Description: The Plan of Care Review/Shift note should be completed every shift.  The Outcome Evaluation is a brief statement about your assessment that the patient is improving, declining, or no change.  This information will be displayed automatically on your shift  note.  Outcome: Met  Flowsheets (Taken 12/31/2024 1208)  Outcome Evaluation: Discharged home undelievered  Plan of Care Reviewed With: patient  Overall Patient Progress: improving  Goal: Patient-Specific Goal (Individualized)  Description: You can add care plan individualizations to a care plan. Examples of Individualization might be:  \"Parent requests to be called daily at 9am for status\", \"I have a hard time hearing out of my right ear\", or \"Do not touch me to wake me up as it startles  me\".  Outcome: Met  Goal: Absence of Hospital-Acquired Illness or Injury  Outcome: Met  Intervention: Prevent Skin Injury  Recent Flowsheet Documentation  Taken 12/31/2024 1056 by Niyah Jarquin RN  Body Position: position changed independently  Intervention: Prevent and Manage VTE (Venous Thromboembolism) Risk  Recent Flowsheet Documentation  Taken 12/31/2024 1056 by Niyah Jarquin RN  VTE Prevention/Management: SCDs off (sequential compression devices)  Intervention: Prevent Infection  Recent Flowsheet Documentation  Taken 12/31/2024 1056 by Niyah Jarquin RN  Infection Prevention:   rest/sleep promoted   hand hygiene promoted   single patient room provided  Goal: Optimal Comfort and Wellbeing  Outcome: Met  Intervention: Monitor Pain and Promote Comfort  Recent Flowsheet Documentation  Taken 12/31/2024 1056 by Niyah Jarquin RN  Pain Management Interventions:   declines   rest  Taken 12/31/2024 0832 by Niyah Jarquni RN  Pain Management Interventions:   declines   rest  Intervention: Provide Person-Centered Care  Recent Flowsheet Documentation  Taken 12/31/2024 1056 by Niyah Jarquin" RN  Trust Relationship/Rapport:   care explained   questions answered   choices provided  Goal: Readiness for Transition of Care  Outcome: Met     Problem: Hypertensive Disorders in Pregnancy  Goal: Patient-Fetal Stabilization  Outcome: Met     Problem: Pain Acute  Goal: Optimal Pain Control and Function  Outcome: Met  Intervention: Develop Pain Management Plan  Recent Flowsheet Documentation  Taken 12/31/2024 1056 by Niyah Jarquin, RN  Pain Management Interventions:   declines   rest  Taken 12/31/2024 0832 by Niyah Jarquin, RN  Pain Management Interventions:   declines   rest   Goal Outcome Evaluation:      Plan of Care Reviewed With: patient    Overall Patient Progress: improvingOverall Patient Progress: improving    Outcome Evaluation: Discharged home undelievered

## 2024-12-31 NOTE — PROGRESS NOTES
Dr. Saleh notified and reviewed strip. Keep patient on the monitor. If becomes reactive, okay to remove continuous monitoring.

## 2025-01-02 LAB
BACTERIA SPEC CULT: ABNORMAL
BACTERIA SPEC CULT: ABNORMAL

## 2025-01-03 ENCOUNTER — TRANSFERRED RECORDS (OUTPATIENT)
Dept: HEALTH INFORMATION MANAGEMENT | Facility: CLINIC | Age: 30
End: 2025-01-03
Payer: COMMERCIAL

## 2025-01-03 ENCOUNTER — MEDICAL CORRESPONDENCE (OUTPATIENT)
Dept: HEALTH INFORMATION MANAGEMENT | Facility: CLINIC | Age: 30
End: 2025-01-03
Payer: COMMERCIAL

## 2025-01-04 LAB — BACTERIA SPEC CULT: ABNORMAL

## 2025-01-06 ENCOUNTER — TRANSCRIBE ORDERS (OUTPATIENT)
Dept: MATERNAL FETAL MEDICINE | Facility: HOSPITAL | Age: 30
End: 2025-01-06
Payer: COMMERCIAL

## 2025-01-06 ENCOUNTER — LAB REQUISITION (OUTPATIENT)
Dept: LAB | Facility: CLINIC | Age: 30
End: 2025-01-06

## 2025-01-06 DIAGNOSIS — O26.90 PREGNANCY RELATED CONDITION: Primary | ICD-10-CM

## 2025-01-06 DIAGNOSIS — O16.9 UNSPECIFIED MATERNAL HYPERTENSION, UNSPECIFIED TRIMESTER: ICD-10-CM

## 2025-01-06 LAB
ALBUMIN SERPL BCG-MCNC: 3.5 G/DL (ref 3.5–5.2)
ALP SERPL-CCNC: 106 U/L (ref 40–150)
ALT SERPL W P-5'-P-CCNC: 13 U/L (ref 0–50)
ANION GAP SERPL CALCULATED.3IONS-SCNC: 9 MMOL/L (ref 7–15)
AST SERPL W P-5'-P-CCNC: 22 U/L (ref 0–45)
BILIRUB SERPL-MCNC: 0.4 MG/DL
BUN SERPL-MCNC: 5.2 MG/DL (ref 6–20)
CALCIUM SERPL-MCNC: 8.7 MG/DL (ref 8.8–10.4)
CHLORIDE SERPL-SCNC: 103 MMOL/L (ref 98–107)
CREAT SERPL-MCNC: 0.55 MG/DL (ref 0.51–0.95)
EGFRCR SERPLBLD CKD-EPI 2021: >90 ML/MIN/1.73M2
ERYTHROCYTE [DISTWIDTH] IN BLOOD BY AUTOMATED COUNT: 13.9 % (ref 10–15)
GLUCOSE SERPL-MCNC: 73 MG/DL (ref 70–99)
HCO3 SERPL-SCNC: 22 MMOL/L (ref 22–29)
HCT VFR BLD AUTO: 30.5 % (ref 35–47)
HGB BLD-MCNC: 10.3 G/DL (ref 11.7–15.7)
MCH RBC QN AUTO: 28.9 PG (ref 26.5–33)
MCHC RBC AUTO-ENTMCNC: 33.8 G/DL (ref 31.5–36.5)
MCV RBC AUTO: 85 FL (ref 78–100)
PLATELET # BLD AUTO: 204 10E3/UL (ref 150–450)
POTASSIUM SERPL-SCNC: 3.7 MMOL/L (ref 3.4–5.3)
PROT SERPL-MCNC: 5.9 G/DL (ref 6.4–8.3)
RBC # BLD AUTO: 3.57 10E6/UL (ref 3.8–5.2)
SODIUM SERPL-SCNC: 134 MMOL/L (ref 135–145)
WBC # BLD AUTO: 6 10E3/UL (ref 4–11)

## 2025-01-06 PROCEDURE — 82435 ASSAY OF BLOOD CHLORIDE: CPT | Performed by: OBSTETRICS & GYNECOLOGY

## 2025-01-06 PROCEDURE — 85018 HEMOGLOBIN: CPT | Performed by: OBSTETRICS & GYNECOLOGY

## 2025-01-06 PROCEDURE — 85041 AUTOMATED RBC COUNT: CPT | Performed by: OBSTETRICS & GYNECOLOGY

## 2025-01-06 PROCEDURE — 84155 ASSAY OF PROTEIN SERUM: CPT | Performed by: OBSTETRICS & GYNECOLOGY

## 2025-01-07 ENCOUNTER — OFFICE VISIT (OUTPATIENT)
Dept: MATERNAL FETAL MEDICINE | Facility: HOSPITAL | Age: 30
End: 2025-01-07
Attending: STUDENT IN AN ORGANIZED HEALTH CARE EDUCATION/TRAINING PROGRAM
Payer: COMMERCIAL

## 2025-01-07 ENCOUNTER — HOSPITAL ENCOUNTER (OUTPATIENT)
Facility: HOSPITAL | Age: 30
Discharge: HOME | End: 2025-01-07
Attending: STUDENT IN AN ORGANIZED HEALTH CARE EDUCATION/TRAINING PROGRAM | Admitting: OBSTETRICS & GYNECOLOGY
Payer: COMMERCIAL

## 2025-01-07 ENCOUNTER — ANCILLARY PROCEDURE (OUTPATIENT)
Dept: ULTRASOUND IMAGING | Facility: HOSPITAL | Age: 30
End: 2025-01-07
Attending: STUDENT IN AN ORGANIZED HEALTH CARE EDUCATION/TRAINING PROGRAM
Payer: COMMERCIAL

## 2025-01-07 VITALS
SYSTOLIC BLOOD PRESSURE: 142 MMHG | HEIGHT: 67 IN | TEMPERATURE: 98.5 F | HEART RATE: 91 BPM | RESPIRATION RATE: 18 BRPM | WEIGHT: 201.7 LBS | BODY MASS INDEX: 31.66 KG/M2 | DIASTOLIC BLOOD PRESSURE: 93 MMHG | OXYGEN SATURATION: 100 %

## 2025-01-07 VITALS — SYSTOLIC BLOOD PRESSURE: 172 MMHG | DIASTOLIC BLOOD PRESSURE: 111 MMHG | HEART RATE: 94 BPM

## 2025-01-07 DIAGNOSIS — O36.5990 FETAL GROWTH RESTRICTION ANTEPARTUM: ICD-10-CM

## 2025-01-07 DIAGNOSIS — O10.919 CHRONIC HYPERTENSION AFFECTING PREGNANCY: ICD-10-CM

## 2025-01-07 PROBLEM — Z36.89 ENCOUNTER FOR TRIAGE IN PREGNANT PATIENT: Status: ACTIVE | Noted: 2022-05-16

## 2025-01-07 LAB
ABO + RH BLD: NORMAL
ALBUMIN MFR UR ELPH: <6 MG/DL
ALBUMIN SERPL BCG-MCNC: 3.6 G/DL (ref 3.5–5.2)
ALP SERPL-CCNC: 117 U/L (ref 40–150)
ALT SERPL W P-5'-P-CCNC: 13 U/L (ref 0–50)
ANION GAP SERPL CALCULATED.3IONS-SCNC: 10 MMOL/L (ref 7–15)
AST SERPL W P-5'-P-CCNC: 19 U/L (ref 0–45)
BILIRUB SERPL-MCNC: 0.2 MG/DL
BLD GP AB SCN SERPL QL: NEGATIVE
BUN SERPL-MCNC: 5.3 MG/DL (ref 6–20)
CALCIUM SERPL-MCNC: 9 MG/DL (ref 8.8–10.4)
CHLORIDE SERPL-SCNC: 106 MMOL/L (ref 98–107)
CREAT SERPL-MCNC: 0.61 MG/DL (ref 0.51–0.95)
CREAT UR-MCNC: 13.6 MG/DL
EGFRCR SERPLBLD CKD-EPI 2021: >90 ML/MIN/1.73M2
ERYTHROCYTE [DISTWIDTH] IN BLOOD BY AUTOMATED COUNT: 13.5 % (ref 10–15)
GLUCOSE SERPL-MCNC: 109 MG/DL (ref 70–99)
HCO3 SERPL-SCNC: 22 MMOL/L (ref 22–29)
HCT VFR BLD AUTO: 32.2 % (ref 35–47)
HGB BLD-MCNC: 10.9 G/DL (ref 11.7–15.7)
MCH RBC QN AUTO: 28.3 PG (ref 26.5–33)
MCHC RBC AUTO-ENTMCNC: 33.9 G/DL (ref 31.5–36.5)
MCV RBC AUTO: 84 FL (ref 78–100)
NT-PROBNP SERPL-MCNC: 132 PG/ML (ref 0–450)
PLATELET # BLD AUTO: 228 10E3/UL (ref 150–450)
POTASSIUM SERPL-SCNC: 3.4 MMOL/L (ref 3.4–5.3)
PROT SERPL-MCNC: 6.3 G/DL (ref 6.4–8.3)
PROT/CREAT 24H UR: NORMAL MG/G{CREAT}
RBC # BLD AUTO: 3.85 10E6/UL (ref 3.8–5.2)
SODIUM SERPL-SCNC: 138 MMOL/L (ref 135–145)
SPECIMEN EXP DATE BLD: NORMAL
TROPONIN T SERPL HS-MCNC: <6 NG/L
WBC # BLD AUTO: 8.7 10E3/UL (ref 4–11)

## 2025-01-07 PROCEDURE — 80053 COMPREHEN METABOLIC PANEL: CPT | Performed by: STUDENT IN AN ORGANIZED HEALTH CARE EDUCATION/TRAINING PROGRAM

## 2025-01-07 PROCEDURE — 85027 COMPLETE CBC AUTOMATED: CPT | Performed by: STUDENT IN AN ORGANIZED HEALTH CARE EDUCATION/TRAINING PROGRAM

## 2025-01-07 PROCEDURE — 93010 ELECTROCARDIOGRAM REPORT: CPT | Performed by: INTERNAL MEDICINE

## 2025-01-07 PROCEDURE — 76820 UMBILICAL ARTERY ECHO: CPT

## 2025-01-07 PROCEDURE — 86850 RBC ANTIBODY SCREEN: CPT | Performed by: STUDENT IN AN ORGANIZED HEALTH CARE EDUCATION/TRAINING PROGRAM

## 2025-01-07 PROCEDURE — 59025 FETAL NON-STRESS TEST: CPT | Mod: 26 | Performed by: STUDENT IN AN ORGANIZED HEALTH CARE EDUCATION/TRAINING PROGRAM

## 2025-01-07 PROCEDURE — G0463 HOSPITAL OUTPT CLINIC VISIT: HCPCS | Mod: 25 | Performed by: STUDENT IN AN ORGANIZED HEALTH CARE EDUCATION/TRAINING PROGRAM

## 2025-01-07 PROCEDURE — G0463 HOSPITAL OUTPT CLINIC VISIT: HCPCS

## 2025-01-07 PROCEDURE — 84484 ASSAY OF TROPONIN QUANT: CPT | Performed by: STUDENT IN AN ORGANIZED HEALTH CARE EDUCATION/TRAINING PROGRAM

## 2025-01-07 PROCEDURE — 36415 COLL VENOUS BLD VENIPUNCTURE: CPT | Performed by: STUDENT IN AN ORGANIZED HEALTH CARE EDUCATION/TRAINING PROGRAM

## 2025-01-07 PROCEDURE — 86900 BLOOD TYPING SEROLOGIC ABO: CPT | Performed by: STUDENT IN AN ORGANIZED HEALTH CARE EDUCATION/TRAINING PROGRAM

## 2025-01-07 PROCEDURE — 59025 FETAL NON-STRESS TEST: CPT

## 2025-01-07 PROCEDURE — 99214 OFFICE O/P EST MOD 30 MIN: CPT | Mod: 25 | Performed by: STUDENT IN AN ORGANIZED HEALTH CARE EDUCATION/TRAINING PROGRAM

## 2025-01-07 PROCEDURE — 76820 UMBILICAL ARTERY ECHO: CPT | Mod: 26 | Performed by: STUDENT IN AN ORGANIZED HEALTH CARE EDUCATION/TRAINING PROGRAM

## 2025-01-07 PROCEDURE — 84156 ASSAY OF PROTEIN URINE: CPT | Performed by: STUDENT IN AN ORGANIZED HEALTH CARE EDUCATION/TRAINING PROGRAM

## 2025-01-07 PROCEDURE — 250N000013 HC RX MED GY IP 250 OP 250 PS 637: Performed by: STUDENT IN AN ORGANIZED HEALTH CARE EDUCATION/TRAINING PROGRAM

## 2025-01-07 PROCEDURE — 93005 ELECTROCARDIOGRAM TRACING: CPT

## 2025-01-07 PROCEDURE — 83880 ASSAY OF NATRIURETIC PEPTIDE: CPT | Performed by: STUDENT IN AN ORGANIZED HEALTH CARE EDUCATION/TRAINING PROGRAM

## 2025-01-07 PROCEDURE — 76815 OB US LIMITED FETUS(S): CPT | Mod: 26 | Performed by: STUDENT IN AN ORGANIZED HEALTH CARE EDUCATION/TRAINING PROGRAM

## 2025-01-07 RX ORDER — NIFEDIPINE 30 MG
30 TABLET, EXTENDED RELEASE ORAL 2 TIMES DAILY
Status: ON HOLD | COMMUNITY
Start: 2025-01-07

## 2025-01-07 RX ORDER — LIDOCAINE 40 MG/G
CREAM TOPICAL
Status: DISCONTINUED | OUTPATIENT
Start: 2025-01-07 | End: 2025-01-07 | Stop reason: HOSPADM

## 2025-01-07 RX ORDER — LABETALOL HYDROCHLORIDE 5 MG/ML
20 INJECTION, SOLUTION INTRAVENOUS
Status: DISCONTINUED | OUTPATIENT
Start: 2025-01-07 | End: 2025-01-07 | Stop reason: HOSPADM

## 2025-01-07 RX ORDER — NIFEDIPINE 10 MG/1
10-20 CAPSULE ORAL
Status: DISCONTINUED | OUTPATIENT
Start: 2025-01-07 | End: 2025-01-07 | Stop reason: HOSPADM

## 2025-01-07 RX ORDER — CYCLOBENZAPRINE HCL 5 MG
5 TABLET ORAL EVERY 8 HOURS PRN
Status: DISCONTINUED | OUTPATIENT
Start: 2025-01-07 | End: 2025-01-07 | Stop reason: HOSPADM

## 2025-01-07 RX ORDER — SODIUM CHLORIDE, SODIUM LACTATE, POTASSIUM CHLORIDE, CALCIUM CHLORIDE 600; 310; 30; 20 MG/100ML; MG/100ML; MG/100ML; MG/100ML
10-125 INJECTION, SOLUTION INTRAVENOUS CONTINUOUS
Status: DISCONTINUED | OUTPATIENT
Start: 2025-01-07 | End: 2025-01-07

## 2025-01-07 RX ADMIN — CYCLOBENZAPRINE HYDROCHLORIDE 5 MG: 5 TABLET, FILM COATED ORAL at 16:51

## 2025-01-07 RX ADMIN — NIFEDIPINE 20 MG: 10 CAPSULE ORAL at 17:04

## 2025-01-07 RX ADMIN — NIFEDIPINE 20 MG: 10 CAPSULE ORAL at 17:34

## 2025-01-07 RX ADMIN — NIFEDIPINE 10 MG: 10 CAPSULE ORAL at 16:19

## 2025-01-07 ASSESSMENT — ACTIVITIES OF DAILY LIVING (ADL)
ADLS_ACUITY_SCORE: 48

## 2025-01-07 NOTE — PROGRESS NOTES
Severe range BP decreased with 10mg PO Nifedipine. Pt now requesting pain medication for her FORRESTER. Dr Garcia updated and orders placed for Flexeril 5mg.

## 2025-01-07 NOTE — PLAN OF CARE
Data: [facility]: 2025  3:44 PM.  Reason for maternal/fetal assessment is elevated blood pressures and 9/10 headache. Patient reports symptoms started around 12pm today. Patient denies uterine contractions, leaking of fluid, vaginal bleeding, blurry vision, or epigastric pain. Reflexes +3, no clonus. Patient reports fetal movement is normal. Patient is a 32w5d .  Prenatal record reviewed. Pregnancy has been complicated by hypertension.    Vital signs are (see flowsheet). Support person is marito Montes De Oca, present at bedside.     Action: Verbal consent for EFM. Triage assessment completed.     Response: Patient verbalized agreement with plan. Will contact [facility] Dr Garcia with update and further orders.

## 2025-01-07 NOTE — NURSING NOTE
Shital Tamayo is a  at 32w5d who presents to [facility] for scheduled weekly fetal surveillance. Pt reports positive fetal movement. Pt denies bldg/lof/change in discharge, contractions, chest pain/SOB or edema. PT REPORTS TERRIBLE HEADACHE AND NAUSEA ASSOCIATED, LESS FETAL MOVEMENT. SBAR given to Dr. ANUJA Jiang, see note in Epic.

## 2025-01-07 NOTE — PROGRESS NOTES
The patient was seen for an ultrasound in the [facility] today.  For a detailed report of the ultrasound examination, please see the ultrasound report which can be found under the imaging tab.     If you have questions regarding today's evaluation or if we can be of further service, please contact the [facility]Fetal Medicine Center.    Charo Jiang MD  Maternal Fetal Medicine

## 2025-01-08 LAB
ATRIAL RATE - MUSE: 107 BPM
DIASTOLIC BLOOD PRESSURE - MUSE: NORMAL MMHG
INTERPRETATION ECG - MUSE: NORMAL
P AXIS - MUSE: 50 DEGREES
PR INTERVAL - MUSE: 130 MS
QRS DURATION - MUSE: 80 MS
QT - MUSE: 392 MS
QTC - MUSE: 523 MS
R AXIS - MUSE: 55 DEGREES
SYSTOLIC BLOOD PRESSURE - MUSE: NORMAL MMHG
T AXIS - MUSE: 48 DEGREES
VENTRICULAR RATE- MUSE: 107 BPM

## 2025-01-08 NOTE — H&P
Date: 2025  Time: 7:29 PM    Admission H&P  Shital Tamayo,  1995, MRN [id number]    PCP: Siobhan Carter, [phone]  Primary OB: Alissa   Code status:  Prior              Chief Complaint: cHTN, HA, FGR     OBSTETRICAL / DATING HISTORY:  Estimated Date of Delivery: Estimated Date of Delivery: 2025  Gestational Age: 32w5d    OB History    Para Term  AB Living   3 1 0 1 1 1   SAB IAB Ectopic Multiple Live Births   0 0 0 0 1      # Outcome Date GA Lbr Michele/2nd Weight Sex Type Anes PTL Lv   3 Current            2  22 33w2d  1.69 kg (3 lb 11.6 oz) M CS-LTranv Spinal  BESS      Complications: Preeclampsia/Hypertension      Name: ZENIAMALE-SHITAL      Apgar1: 8  Apgar5: 9   1 AB 19               HPI:    Shital Tamayo is a 29 year old year old at 32w5d weeks (by 7 week US not consistent with LMP). She presented to L&D this afternoon from [facility] clinic.    Shital has history of pre-eclampsia with severe features in her last pregnancy.  She reports that she delivered  at 33 weeks due to pre-eclampsia with severe features (BP criteria).    This pregnancy she has cHTN.  She was on labetalol and procardia during pregnancy, was on both medications <20 week and had multiple mild-range BP's <20 weeks most consistent with cHTN.    She has had multiple admissions this pregnancy for BP control and HA evaluation.  Last admision she was also diagnosed with FGR.  Today she was at [facility] for her scheduled US, she had a severe-range BP and also reported HA and chest pain, thus was sent to L&D for further evaluation.    She has known cHTN, meds adjusted in last admission.  She reports that she is taking procardia 1-2 times per day.  She takes it once in the morning and then decides if she should take a second dose based on her BP's that day.  She expresses frustration, feels unclear of her plan of care.  She worries about her BP and feels like they are similar to her last pregnancy when  "she was delivered  for similar BP's.  She worries about baby inside her uterus, worries how long he should stay inside if he isn't growing.    On initial presentation reported symptom of \"heaviness\" in her chest.      OB Hx:     Medical History  Past Medical History:   Diagnosis Date    ADHD (attention deficit hyperactivity disorder)     Anxiety     Depressive disorder        Surgical History  Past Surgical History:   Procedure Laterality Date    AS RAD RESEC TONSIL/PILLARS Bilateral 2015     SECTION N/A 2022    Procedure:  SECTION;  Surgeon: Becki Nieves MD;  Location: [facility]    DILATION AND CURETTAGE N/A 2019    Procedure: DILATION AND CURETTAGE, UTERUS, USING SUCTION;  Surgeon: Becki Nieves MD;  Location: [facility];  Service: Obstetrics       Social History  Social History     Socioeconomic History    Marital status: Single     Spouse name: Not on file    Number of children: Not on file    Years of education: Not on file    Highest education level: Not on file   Occupational History    Not on file   Tobacco Use    Smoking status: Never     Passive exposure: Never    Smokeless tobacco: Never   Vaping Use    Vaping status: Never Used   Substance and Sexual Activity    Alcohol use: No     Comment: Alcoholic Drinks/day: occasional use    Drug use: No    Sexual activity: Not Currently     Partners: Male   Other Topics Concern    Not on file   Social History Narrative    Not on file     Social Drivers of Health     Financial Resource Strain: Low Risk  (2024)    Financial Resource Strain     Within the past 12 months, have you or your family members you live with been unable to get utilities (heat, electricity) when it was really needed?: No   Food Insecurity: Low Risk  (2024)    Food Insecurity     Within the past 12 months, did you worry that your food would run out before you got money to buy more?: No     Within the " past 12 months, did the food you bought just not last and you didn t have money to get more?: No   Transportation Needs: Low Risk  (12/30/2024)    Transportation Needs     Within the past 12 months, has lack of transportation kept you from medical appointments, getting your medicines, non-medical meetings or appointments, work, or from getting things that you need?: No   Physical Activity: Not on file   Stress: Not on file   Social Connections: Socially Integrated (6/7/2024)    Received from [facility] [employer] [facility]    Social Connections     Do you often feel lonely or isolated from those around you?: 0   Interpersonal Safety: Low Risk  (12/30/2024)    Interpersonal Safety     Do you feel physically and emotionally safe where you currently live?: Yes     Within the past 12 months, have you been hit, slapped, kicked or otherwise physically hurt by someone?: No     Within the past 12 months, have you been humiliated or emotionally abused in other ways by your partner or ex-partner?: No   Housing Stability: Low Risk  (12/30/2024)    Housing Stability     Do you have housing? : Yes     Are you worried about losing your housing?: No       Allergies   Allergen Reactions    Ondansetron Anxiety, Dizziness and Other (See Comments)       Medications Prior to Admission   Medication Sig Dispense Refill Last Dose/Taking    ADDERALL XR 30 MG 24 hr capsule Take 30 mg by mouth daily.   Past Month    aspirin-acetaminophen-caffeine (EXCEDRIN MIGRAINE) 250-250-65 MG tablet Take 1 tablet by mouth daily as needed for headaches.   1/7/2025    escitalopram (LEXAPRO) 20 MG tablet Take 20 mg by mouth daily.   1/7/2025    hydrOXYzine (ATARAX) 50 MG tablet Take 2 tablets (100 mg) by mouth 3 times daily as needed for anxiety 40 tablet 0 1/7/2025    NIFEdipine ER (ADALAT CC) 30 MG 24 hr tablet Take 1 tablet (30 mg) by mouth daily. 30 tablet 0 1/7/2025 at  1:30 PM       Review of Systems:  Pertinent items are noted in  "HPI.    Physical Exam:  Temp:  [98.5  F (36.9  C)] 98.5  F (36.9  C)  Pulse:  [] 90  Resp:  [18] 18  BP: (142-182)/() 152/97  SpO2:  [96 %-100 %] 100 %    BP (!) 152/97 (BP Location: Right arm, Patient Position: Semi-Mccallum's, Cuff Size: Adult Regular)   Pulse 90   Temp 98.5  F (36.9  C) (Oral)   Resp 18   Ht 1.702 m (5' 7\")   Wt 91.5 kg (201 lb 11.2 oz)   LMP 05/14/2024   SpO2 100%   BMI 31.59 kg/m          Pertinent Labs  Admission on 01/07/2025   Component Date Value Ref Range Status    WBC Count 01/07/2025 8.7  4.0 - 11.0 10e3/uL Final    RBC Count 01/07/2025 3.85  3.80 - 5.20 10e6/uL Final    Hemoglobin 01/07/2025 10.9 (L)  11.7 - 15.7 g/dL Final    Hematocrit 01/07/2025 32.2 (L)  35.0 - 47.0 % Final    MCV 01/07/2025 84  78 - 100 fL Final    MCH 01/07/2025 28.3  26.5 - 33.0 pg Final    MCHC 01/07/2025 33.9  31.5 - 36.5 g/dL Final    RDW 01/07/2025 13.5  10.0 - 15.0 % Final    Platelet Count 01/07/2025 228  150 - 450 10e3/uL Final    Sodium 01/07/2025 138  135 - 145 mmol/L Final    Potassium 01/07/2025 3.4  3.4 - 5.3 mmol/L Final    Carbon Dioxide (CO2) 01/07/2025 22  22 - 29 mmol/L Final    Anion Gap 01/07/2025 10  7 - 15 mmol/L Final    Urea Nitrogen 01/07/2025 5.3 (L)  6.0 - 20.0 mg/dL Final    Creatinine 01/07/2025 0.61  0.51 - 0.95 mg/dL Final    GFR Estimate 01/07/2025 >90  >60 mL/min/1.73m2 Final    eGFR calculated using 2021 CKD-EPI equation.    Calcium 01/07/2025 9.0  8.8 - 10.4 mg/dL Final    Reference intervals for this test were updated on 7/16/2024 to reflect our healthy population more accurately. There may be differences in the flagging of prior results with similar values performed with this method. Those prior results can be interpreted in the context of the updated reference intervals.    Chloride 01/07/2025 106  98 - 107 mmol/L Final    Glucose 01/07/2025 109 (H)  70 - 99 mg/dL Final    Alkaline Phosphatase 01/07/2025 117  40 - 150 U/L Final    AST 01/07/2025 19  0 - 45 " U/L Final    ALT 01/07/2025 13  0 - 50 U/L Final    Protein Total 01/07/2025 6.3 (L)  6.4 - 8.3 g/dL Final    Albumin 01/07/2025 3.6  3.5 - 5.2 g/dL Final    Bilirubin Total 01/07/2025 0.2  <=1.2 mg/dL Final    Total Protein Urine mg/dL 01/07/2025 <6.0    mg/dL Final    The reference ranges have not been established in urine protein. The results should be integrated into the clinical context for interpretation.    Total Protein Urine mg/mg Creat 01/07/2025    Final    Unable to calculate, urine creatinine or protein is outside the detectable limits.    Creatinine Urine mg/dL 01/07/2025 13.6  mg/dL Final    The reference ranges have not been established in urine creatinine. The results should be integrated into the clinical context for interpretation.    Ventricular Rate 01/07/2025 107  BPM Incomplete    Atrial Rate 01/07/2025 107  BPM Incomplete    MI Interval 01/07/2025 130  ms Incomplete    QRS Duration 01/07/2025 80  ms Incomplete    QT 01/07/2025 392  ms Incomplete    QTc 01/07/2025 523  ms Incomplete    P Axis 01/07/2025 50  degrees Incomplete    R AXIS 01/07/2025 55  degrees Incomplete    T Axis 01/07/2025 48  degrees Incomplete    Interpretation ECG 01/07/2025    Incomplete                    Value:Sinus tachycardia  Prolonged QT  Abnormal ECG  When compared with ECG of 03-Aug-2024 13:57,  No significant change was found      N terminal Pro BNP Inpatient 01/07/2025 132  0 - 450 pg/mL Final    Reference range shown and results flagged as abnormal are suggested inpatient cut points for confirming diagnosis if CHF in an acute setting. Establishing a baseline value for each individual patient is useful for follow-up. An inpatient or emergency department NT-proPBNP <300 pg/mL effectively rules out acute CHF, with 99% negative predictive value.    The outpatient non-acute reference range for ruling out CHF is:  0-125 pg/mL (age 18 to less than 75)  0-450 pg/mL (age 75 yrs and older)     Troponin T, High Sensitivity  01/07/2025 <6  <=14 ng/L Final    Either a High Sensitivity Troponin T baseline (0 hours) value = 100 ng/L, or an increase in High Sensitivity Troponin T = 7 ng/L at 2 hours compared to 0 hours (2-0 hours), suggests myocardial injury, and urgent clinical attention is required.    If the 2-0 hours increase is <7 ng/L, a High Sensitivity Troponin T result above gender-specific reference ranges warrants further evaluation.   Recommendations for further evaluation include correlation with clinical decision-making tool (e.g., HEART), a 3rd High Sensitivity Troponin T test 2 hours after the 2nd (a 20% change from baseline would represent concern), admission for observation, close PCC/cardiology follow-up, or urgent outpatient provocative testing.    ABO/RH(D) 01/07/2025 A POS   Final    Antibody Screen 01/07/2025 Negative  Negative Final    SPECIMEN EXPIRATION DATE 01/07/2025 [id number]   Final   Lab Requisition on 01/06/2025   Component Date Value Ref Range Status    WBC Count 01/06/2025 6.0  4.0 - 11.0 10e3/uL Final    RBC Count 01/06/2025 3.57 (L)  3.80 - 5.20 10e6/uL Final    Hemoglobin 01/06/2025 10.3 (L)  11.7 - 15.7 g/dL Final    Hematocrit 01/06/2025 30.5 (L)  35.0 - 47.0 % Final    MCV 01/06/2025 85  78 - 100 fL Final    MCH 01/06/2025 28.9  26.5 - 33.0 pg Final    MCHC 01/06/2025 33.8  31.5 - 36.5 g/dL Final    RDW 01/06/2025 13.9  10.0 - 15.0 % Final    Platelet Count 01/06/2025 204  150 - 450 10e3/uL Final    Sodium 01/06/2025 134 (L)  135 - 145 mmol/L Final    Potassium 01/06/2025 3.7  3.4 - 5.3 mmol/L Final    Carbon Dioxide (CO2) 01/06/2025 22  22 - 29 mmol/L Final    Anion Gap 01/06/2025 9  7 - 15 mmol/L Final    Urea Nitrogen 01/06/2025 5.2 (L)  6.0 - 20.0 mg/dL Final    Creatinine 01/06/2025 0.55  0.51 - 0.95 mg/dL Final    GFR Estimate 01/06/2025 >90  >60 mL/min/1.73m2 Final    eGFR calculated using 2021 CKD-EPI equation.    Calcium 01/06/2025 8.7 (L)  8.8 - 10.4 mg/dL Final    Reference intervals  for this test were updated on 7/16/2024 to reflect our healthy population more accurately. There may be differences in the flagging of prior results with similar values performed with this method. Those prior results can be interpreted in the context of the updated reference intervals.    Chloride 01/06/2025 103  98 - 107 mmol/L Final    Glucose 01/06/2025 73  70 - 99 mg/dL Final    Alkaline Phosphatase 01/06/2025 106  40 - 150 U/L Final    AST 01/06/2025 22  0 - 45 U/L Final    ALT 01/06/2025 13  0 - 50 U/L Final    Protein Total 01/06/2025 5.9 (L)  6.4 - 8.3 g/dL Final    Albumin 01/06/2025 3.5  3.5 - 5.2 g/dL Final    Bilirubin Total 01/06/2025 0.4  <=1.2 mg/dL Final   Admission on 12/29/2024, Discharged on 12/31/2024   Component Date Value Ref Range Status    WBC Count 12/29/2024 7.9  4.0 - 11.0 10e3/uL Final    RBC Count 12/29/2024 3.64 (L)  3.80 - 5.20 10e6/uL Final    Hemoglobin 12/29/2024 10.1 (L)  11.7 - 15.7 g/dL Final    Hematocrit 12/29/2024 31.3 (L)  35.0 - 47.0 % Final    MCV 12/29/2024 86  78 - 100 fL Final    MCH 12/29/2024 27.7  26.5 - 33.0 pg Final    MCHC 12/29/2024 32.3  31.5 - 36.5 g/dL Final    RDW 12/29/2024 13.3  10.0 - 15.0 % Final    Platelet Count 12/29/2024 176  150 - 450 10e3/uL Final    Sodium 12/29/2024 136  135 - 145 mmol/L Final    Potassium 12/29/2024 3.5  3.4 - 5.3 mmol/L Final    Carbon Dioxide (CO2) 12/29/2024 22  22 - 29 mmol/L Final    Anion Gap 12/29/2024 10  7 - 15 mmol/L Final    Urea Nitrogen 12/29/2024 5.8 (L)  6.0 - 20.0 mg/dL Final    Creatinine 12/29/2024 0.57  0.51 - 0.95 mg/dL Final    GFR Estimate 12/29/2024 >90  >60 mL/min/1.73m2 Final    eGFR calculated using 2021 CKD-EPI equation.    Calcium 12/29/2024 8.7 (L)  8.8 - 10.4 mg/dL Final    Reference intervals for this test were updated on 7/16/2024 to reflect our healthy population more accurately. There may be differences in the flagging of prior results with similar values performed with this method. Those prior  results can be interpreted in the context of the updated reference intervals.    Chloride 12/29/2024 104  98 - 107 mmol/L Final    Glucose 12/29/2024 104 (H)  70 - 99 mg/dL Final    Alkaline Phosphatase 12/29/2024 91  40 - 150 U/L Final    AST 12/29/2024 17  0 - 45 U/L Final    ALT 12/29/2024 10  0 - 50 U/L Final    Protein Total 12/29/2024 5.7 (L)  6.4 - 8.3 g/dL Final    Albumin 12/29/2024 3.3 (L)  3.5 - 5.2 g/dL Final    Bilirubin Total 12/29/2024 0.2  <=1.2 mg/dL Final    Total Protein Urine mg/dL 12/29/2024 7.9    mg/dL Final    The reference ranges have not been established in urine protein. The results should be integrated into the clinical context for interpretation.    Total Protein Urine mg/mg Creat 12/29/2024 0.12  0.00 - 0.20 mg/mg Cr Final    Creatinine Urine mg/dL 12/29/2024 67.9  mg/dL Final    The reference ranges have not been established in urine creatinine. The results should be integrated into the clinical context for interpretation.    ABO/RH(D) 12/29/2024 A POS   Final    Antibody Screen 12/29/2024 Negative  Negative Final    SPECIMEN EXPIRATION DATE 12/29/2024 [id number]   Final    Group B Strep PCR 12/30/2024 Positive (A)  Negative Final    Subsequent culture and susceptibility will be performed.  ALERT: Streptococcus agalactiae (Group B Streptococcus) has a high rate of resistance to clindamycin. Therefore, clindamycin is not recommended for treatment unless susceptibility testing has been performed.    Sodium 12/31/2024 137  135 - 145 mmol/L Final    Potassium 12/31/2024 4.1  3.4 - 5.3 mmol/L Final    Carbon Dioxide (CO2) 12/31/2024 21 (L)  22 - 29 mmol/L Final    Anion Gap 12/31/2024 10  7 - 15 mmol/L Final    Urea Nitrogen 12/31/2024 7.0  6.0 - 20.0 mg/dL Final    Creatinine 12/31/2024 0.57  0.51 - 0.95 mg/dL Final    GFR Estimate 12/31/2024 >90  >60 mL/min/1.73m2 Final    eGFR calculated using 2021 CKD-EPI equation.    Calcium 12/31/2024 8.5 (L)  8.8 - 10.4 mg/dL Final    Reference  intervals for this test were updated on 7/16/2024 to reflect our healthy population more accurately. There may be differences in the flagging of prior results with similar values performed with this method. Those prior results can be interpreted in the context of the updated reference intervals.    Chloride 12/31/2024 106  98 - 107 mmol/L Final    Glucose 12/31/2024 106 (H)  70 - 99 mg/dL Final    Alkaline Phosphatase 12/31/2024 90  40 - 150 U/L Final    AST 12/31/2024 15  0 - 45 U/L Final    ALT 12/31/2024 11  0 - 50 U/L Final    Protein Total 12/31/2024 5.4 (L)  6.4 - 8.3 g/dL Final    Albumin 12/31/2024 3.0 (L)  3.5 - 5.2 g/dL Final    Bilirubin Total 12/31/2024 0.2  <=1.2 mg/dL Final    WBC Count 12/31/2024 8.4  4.0 - 11.0 10e3/uL Final    RBC Count 12/31/2024 3.75 (L)  3.80 - 5.20 10e6/uL Final    Hemoglobin 12/31/2024 10.4 (L)  11.7 - 15.7 g/dL Final    Hematocrit 12/31/2024 32.2 (L)  35.0 - 47.0 % Final    MCV 12/31/2024 86  78 - 100 fL Final    MCH 12/31/2024 27.7  26.5 - 33.0 pg Final    MCHC 12/31/2024 32.3  31.5 - 36.5 g/dL Final    RDW 12/31/2024 13.3  10.0 - 15.0 % Final    Platelet Count 12/31/2024 180  150 - 450 10e3/uL Final    Culture 12/30/2024 Streptococcus agalactiae (Group B Streptococcus) (A)   Final   Admission on 12/15/2024, Discharged on 12/17/2024   Component Date Value Ref Range Status    WBC Count 12/15/2024 11.3 (H)  4.0 - 11.0 10e3/uL Final    RBC Count 12/15/2024 3.73 (L)  3.80 - 5.20 10e6/uL Final    Hemoglobin 12/15/2024 10.9 (L)  11.7 - 15.7 g/dL Final    Hematocrit 12/15/2024 32.3 (L)  35.0 - 47.0 % Final    MCV 12/15/2024 87  78 - 100 fL Final    MCH 12/15/2024 29.2  26.5 - 33.0 pg Final    MCHC 12/15/2024 33.7  31.5 - 36.5 g/dL Final    RDW 12/15/2024 13.4  10.0 - 15.0 % Final    Platelet Count 12/15/2024 269  150 - 450 10e3/uL Final    Sodium 12/15/2024 136  135 - 145 mmol/L Final    Potassium 12/15/2024 3.5  3.4 - 5.3 mmol/L Final    Carbon Dioxide (CO2) 12/15/2024 23  22 - 29  mmol/L Final    Anion Gap 12/15/2024 10  7 - 15 mmol/L Final    Urea Nitrogen 12/15/2024 7.6  6.0 - 20.0 mg/dL Final    Creatinine 12/15/2024 0.55  0.51 - 0.95 mg/dL Final    GFR Estimate 12/15/2024 >90  >60 mL/min/1.73m2 Final    eGFR calculated using 2021 CKD-EPI equation.    Calcium 12/15/2024 8.6 (L)  8.8 - 10.4 mg/dL Final    Reference intervals for this test were updated on 7/16/2024 to reflect our healthy population more accurately. There may be differences in the flagging of prior results with similar values performed with this method. Those prior results can be interpreted in the context of the updated reference intervals.    Chloride 12/15/2024 103  98 - 107 mmol/L Final    Glucose 12/15/2024 96  70 - 99 mg/dL Final    Alkaline Phosphatase 12/15/2024 81  40 - 150 U/L Final    AST 12/15/2024 15  0 - 45 U/L Final    ALT 12/15/2024 11  0 - 50 U/L Final    Protein Total 12/15/2024 6.2 (L)  6.4 - 8.3 g/dL Final    Albumin 12/15/2024 3.6  3.5 - 5.2 g/dL Final    Bilirubin Total 12/15/2024 0.2  <=1.2 mg/dL Final    Total Protein Urine mg/dL 12/15/2024 6.3    mg/dL Final    The reference ranges have not been established in urine protein. The results should be integrated into the clinical context for interpretation.    Total Protein Urine mg/mg Creat 12/15/2024 0.12  0.00 - 0.20 mg/mg Cr Final    Creatinine Urine mg/dL 12/15/2024 51.2  mg/dL Final    The reference ranges have not been established in urine creatinine. The results should be integrated into the clinical context for interpretation.    ALT 12/16/2024 11  0 - 50 U/L Final    AST 12/16/2024 14  0 - 45 U/L Final    Creatinine 12/16/2024 0.53  0.51 - 0.95 mg/dL Final    GFR Estimate 12/16/2024 >90  >60 mL/min/1.73m2 Final    eGFR calculated using 2021 CKD-EPI equation.    Hemoglobin 12/16/2024 11.3 (L)  11.7 - 15.7 g/dL Final    Platelet Count 12/16/2024 257  150 - 450 10e3/uL Final    ALT 12/17/2024 10  0 - 50 U/L Final    AST 12/17/2024 15  0 - 45 U/L  Final    Creatinine 12/17/2024 0.54  0.51 - 0.95 mg/dL Final    GFR Estimate 12/17/2024 >90  >60 mL/min/1.73m2 Final    eGFR calculated using 2021 CKD-EPI equation.    Hemoglobin 12/17/2024 12.0  11.7 - 15.7 g/dL Final    Platelet Count 12/17/2024 299  150 - 450 10e3/uL Final   Admission on 12/07/2024, Discharged on 12/08/2024   Component Date Value Ref Range Status    Sodium 12/07/2024 138  135 - 145 mmol/L Final    Potassium 12/07/2024 3.6  3.4 - 5.3 mmol/L Final    Carbon Dioxide (CO2) 12/07/2024 23  22 - 29 mmol/L Final    Anion Gap 12/07/2024 11  7 - 15 mmol/L Final    Urea Nitrogen 12/07/2024 4.6 (L)  6.0 - 20.0 mg/dL Final    Creatinine 12/07/2024 0.61  0.51 - 0.95 mg/dL Final    GFR Estimate 12/07/2024 >90  >60 mL/min/1.73m2 Final    eGFR calculated using 2021 CKD-EPI equation.    Calcium 12/07/2024 8.5 (L)  8.8 - 10.4 mg/dL Final    Reference intervals for this test were updated on 7/16/2024 to reflect our healthy population more accurately. There may be differences in the flagging of prior results with similar values performed with this method. Those prior results can be interpreted in the context of the updated reference intervals.    Chloride 12/07/2024 104  98 - 107 mmol/L Final    Glucose 12/07/2024 102 (H)  70 - 99 mg/dL Final    Alkaline Phosphatase 12/07/2024 80  40 - 150 U/L Final    AST 12/07/2024 19  0 - 45 U/L Final    ALT 12/07/2024 12  0 - 50 U/L Final    Protein Total 12/07/2024 6.3 (L)  6.4 - 8.3 g/dL Final    Albumin 12/07/2024 3.6  3.5 - 5.2 g/dL Final    Bilirubin Total 12/07/2024 0.2  <=1.2 mg/dL Final    Total Protein Urine mg/dL 12/07/2024 <6.0    mg/dL Final    The reference ranges have not been established in urine protein. The results should be integrated into the clinical context for interpretation.    Total Protein Urine mg/mg Creat 12/07/2024    Final    Unable to calculate, urine creatinine or protein is outside the detectable limits.    Creatinine Urine mg/dL 12/07/2024 8.2   mg/dL Final    The reference ranges have not been established in urine creatinine. The results should be integrated into the clinical context for interpretation.    Magnesium 12/07/2024 1.7  1.7 - 2.3 mg/dL Final    Haptoglobin 12/07/2024 102  30 - 200 mg/dL Final    Lactate Dehydrogenase 12/07/2024 191  0 - 250 U/L Final    Fibrinogen Activity 12/07/2024 359  170 - 510 mg/dL Final    Effective 7/30/2024, the reference range for this assay has changed. There may be differences in the flagging of prior results with similar values performed with this method.    INR 12/07/2024 1.01  0.85 - 1.15 Final    aPTT 12/07/2024 29  22 - 38 Seconds Final    ALT 12/07/2024 12  0 - 50 U/L Final    AST 12/07/2024 19  0 - 45 U/L Final    Creatinine 12/07/2024 0.61  0.51 - 0.95 mg/dL Final    GFR Estimate 12/07/2024 >90  >60 mL/min/1.73m2 Final    eGFR calculated using 2021 CKD-EPI equation.  eGFR calculated using 2021 CKD-EPI equation.    Hemoglobin 12/07/2024 10.8 (L)  11.7 - 15.7 g/dL Final    Platelet Count 12/07/2024 217  150 - 450 10e3/uL Final    WBC Count 12/07/2024 9.8  4.0 - 11.0 10e3/uL Final    RBC Count 12/07/2024 3.74 (L)  3.80 - 5.20 10e6/uL Final    Hemoglobin 12/07/2024 10.8 (L)  11.7 - 15.7 g/dL Final    Hematocrit 12/07/2024 32.2 (L)  35.0 - 47.0 % Final    MCV 12/07/2024 86  78 - 100 fL Final    MCH 12/07/2024 28.9  26.5 - 33.0 pg Final    MCHC 12/07/2024 33.5  31.5 - 36.5 g/dL Final    RDW 12/07/2024 13.3  10.0 - 15.0 % Final    Platelet Count 12/07/2024 217  150 - 450 10e3/uL Final    % Neutrophils 12/07/2024 74  % Final    % Lymphocytes 12/07/2024 16  % Final    % Monocytes 12/07/2024 8  % Final    % Eosinophils 12/07/2024 1  % Final    % Basophils 12/07/2024 1  % Final    % Immature Granulocytes 12/07/2024 1  % Final    NRBCs per 100 WBC 12/07/2024 0  <1 /100 Final    Absolute Neutrophils 12/07/2024 7.2  1.6 - 8.3 10e3/uL Final    Absolute Lymphocytes 12/07/2024 1.6  0.8 - 5.3 10e3/uL Final    Absolute  Monocytes 12/07/2024 0.7  0.0 - 1.3 10e3/uL Final    Absolute Eosinophils 12/07/2024 0.1  0.0 - 0.7 10e3/uL Final    Absolute Basophils 12/07/2024 0.1  0.0 - 0.2 10e3/uL Final    Absolute Immature Granulocytes 12/07/2024 0.1  <=0.4 10e3/uL Final    Absolute NRBCs 12/07/2024 0.0  10e3/uL Final    Hold Specimen 12/07/2024 JIC   Final    ALT 12/08/2024 10  0 - 50 U/L Final    AST 12/08/2024 17  0 - 45 U/L Final    Creatinine 12/08/2024 0.61  0.51 - 0.95 mg/dL Final    GFR Estimate 12/08/2024 >90  >60 mL/min/1.73m2 Final    eGFR calculated using 2021 CKD-EPI equation.    Hemoglobin 12/08/2024 10.3 (L)  11.7 - 15.7 g/dL Final    Platelet Count 12/08/2024 221  150 - 450 10e3/uL Final   Lab Requisition on 12/06/2024   Component Date Value Ref Range Status    Rubella Rosaura IgG Instrument Value 12/06/2024 2.81  <0.90 Index Final    Rubella Antibody IgG 12/06/2024 Positive   Final    Suggests previous exposure or immunization and probable immunity.    Sodium 12/06/2024 138  135 - 145 mmol/L Final    Potassium 12/06/2024 3.9  3.4 - 5.3 mmol/L Final    Carbon Dioxide (CO2) 12/06/2024 25  22 - 29 mmol/L Final    Anion Gap 12/06/2024 7  7 - 15 mmol/L Final    Urea Nitrogen 12/06/2024 5.0 (L)  6.0 - 20.0 mg/dL Final    Creatinine 12/06/2024 0.63  0.51 - 0.95 mg/dL Final    GFR Estimate 12/06/2024 >90  >60 mL/min/1.73m2 Final    eGFR calculated using 2021 CKD-EPI equation.    Calcium 12/06/2024 8.4 (L)  8.8 - 10.4 mg/dL Final    Reference intervals for this test were updated on 7/16/2024 to reflect our healthy population more accurately. There may be differences in the flagging of prior results with similar values performed with this method. Those prior results can be interpreted in the context of the updated reference intervals.    Chloride 12/06/2024 106  98 - 107 mmol/L Final    Glucose 12/06/2024 102 (H)  70 - 99 mg/dL Final    Alkaline Phosphatase 12/06/2024 76  40 - 150 U/L Final    AST 12/06/2024 20  0 - 45 U/L Final    ALT  12/06/2024 12  0 - 50 U/L Final    Protein Total 12/06/2024 6.0 (L)  6.4 - 8.3 g/dL Final    Albumin 12/06/2024 3.4 (L)  3.5 - 5.2 g/dL Final    Bilirubin Total 12/06/2024 0.2  <=1.2 mg/dL Final    Total Protein Urine mg/dL 12/06/2024 <6.0    mg/dL Final    The reference ranges have not been established in urine protein. The results should be integrated into the clinical context for interpretation.    Total Protein Urine mg/mg Creat 12/06/2024    Final    Unable to calculate, urine creatinine or protein is outside the detectable limits.    Creatinine Urine mg/dL 12/06/2024 20.2  mg/dL Final    The reference ranges have not been established in urine creatinine. The results should be integrated into the clinical context for interpretation.    Culture 12/06/2024 No Growth   Final    Hepatitis B Surface Antigen 12/06/2024 Nonreactive  Nonreactive Final    HIV Antigen Antibody Combo 12/06/2024 Nonreactive  Nonreactive Final    Negative HIV-1 p24 antigen and HIV-1/2 antibody screening test results usually indicate the absence of HIV-1 and HIV-2 infection. However, such negative results do not rule-out acute HIV infection.  If acute HIV-1 or HIV-2 infection is suspected, detection of HIV-1 or HIV-2 RNA  is recommended. This result is obtained using the Roche Elecsys HIV Duo method on the wolfgang [id number] immunoassay analyzer.    Hepatitis C Antibody 12/06/2024 Nonreactive  Nonreactive Final    A nonreactive screening test result does not exclude the possibility of exposure to or infection with HCV. Nonreactive screening test results in individuals with prior exposure to HCV may be due to antibody levels below the limit of detection of this assay or lack of reactivity to the HCV antigens used in this assay. Patients with recent HCV infections (<3 months from time of exposure) may have false-negative HCV antibody results due to the time needed for seroconversion (average of 8 to 9 weeks).    Treponema Antibody Total 12/06/2024  Nonreactive  Nonreactive Final    Uric Acid 12/06/2024 3.2  2.4 - 5.7 mg/dL Final    WBC Count 12/06/2024 8.1  4.0 - 11.0 10e3/uL Final    RBC Count 12/06/2024 3.79 (L)  3.80 - 5.20 10e6/uL Final    Hemoglobin 12/06/2024 11.1 (L)  11.7 - 15.7 g/dL Final    Hematocrit 12/06/2024 33.9 (L)  35.0 - 47.0 % Final    MCV 12/06/2024 89  78 - 100 fL Final    MCH 12/06/2024 29.3  26.5 - 33.0 pg Final    MCHC 12/06/2024 32.7  31.5 - 36.5 g/dL Final    RDW 12/06/2024 13.5  10.0 - 15.0 % Final    Platelet Count 12/06/2024 228  150 - 450 10e3/uL Final    % Neutrophils 12/06/2024 65  % Final    % Lymphocytes 12/06/2024 22  % Final    % Monocytes 12/06/2024 9  % Final    % Eosinophils 12/06/2024 3  % Final    % Basophils 12/06/2024 1  % Final    % Immature Granulocytes 12/06/2024 1  % Final    NRBCs per 100 WBC 12/06/2024 0  <1 /100 Final    Absolute Neutrophils 12/06/2024 5.2  1.6 - 8.3 10e3/uL Final    Absolute Lymphocytes 12/06/2024 1.8  0.8 - 5.3 10e3/uL Final    Absolute Monocytes 12/06/2024 0.7  0.0 - 1.3 10e3/uL Final    Absolute Eosinophils 12/06/2024 0.2  0.0 - 0.7 10e3/uL Final    Absolute Basophils 12/06/2024 0.0  0.0 - 0.2 10e3/uL Final    Absolute Immature Granulocytes 12/06/2024 0.1  <=0.4 10e3/uL Final    Absolute NRBCs 12/06/2024 0.0  10e3/uL Final    ABO/RH(D) 12/06/2024 A POS   Final    Antibody Screen 12/06/2024 Negative  Negative Final    SPECIMEN EXPIRATION DATE 12/06/2024 [id number]   Final   Admission on 10/09/2024, Discharged on 10/09/2024   Component Date Value Ref Range Status    Trichomonas 10/09/2024 Absent  Absent Final    Yeast 10/09/2024 Absent  Absent Final    Clue Cells 10/09/2024 Present (A)  Absent Final    WBCs/high power field 10/09/2024 1+ (A)  None Final    Color Urine 10/09/2024 Colorless  Colorless, Straw, Light Yellow, Yellow Final    Appearance Urine 10/09/2024 Clear  Clear Final    Glucose Urine 10/09/2024 Negative  Negative mg/dL Final    Bilirubin Urine 10/09/2024 Negative  Negative  Final    Ketones Urine 10/09/2024 Negative  Negative mg/dL Final    Specific Gravity Urine 10/09/2024 1.003  1.001 - 1.030 Final    Blood Urine 10/09/2024 0.1 mg/dL (A)  Negative Final    pH Urine 10/09/2024 7.0  5.0 - 7.0 Final    Protein Albumin Urine 10/09/2024 Negative  Negative mg/dL Final    Urobilinogen Urine 10/09/2024 <2.0  <2.0 mg/dL Final    Nitrite Urine 10/09/2024 Negative  Negative Final    Leukocyte Esterase Urine 10/09/2024 Negative  Negative Final    RBC Urine 10/09/2024 <1  <=2 /HPF Final    WBC Urine 10/09/2024 1  <=5 /HPF Final    Squamous Epithelials Urine 10/09/2024 1  <=1 /HPF Final   Admission on 08/03/2024, Discharged on 08/03/2024   Component Date Value Ref Range Status    Ventricular Rate 08/03/2024 96  BPM Final    Atrial Rate 08/03/2024 96  BPM Final    UT Interval 08/03/2024 134  ms Final    QRS Duration 08/03/2024 78  ms Final    QT 08/03/2024 378  ms Final    QTc 08/03/2024 477  ms Final    P Axis 08/03/2024 71  degrees Final    R AXIS 08/03/2024 78  degrees Final    T Axis 08/03/2024 69  degrees Final    Interpretation ECG 08/03/2024    Final                    Value:Sinus rhythm  Normal ECG  No previous ECGs available  Confirmed by SEE ED PROVIDER NOTE FOR, ECG INTERPRETATION ([id number]),  Bello Monsalve ([id number]) on 8/12/2024 1:56:10 PM      INR 08/03/2024 1.06  0.85 - 1.15 Final    Sodium 08/03/2024 138  135 - 145 mmol/L Final    Potassium 08/03/2024 4.0  3.4 - 5.3 mmol/L Final    Chloride 08/03/2024 106  98 - 107 mmol/L Final    Carbon Dioxide (CO2) 08/03/2024 22  22 - 29 mmol/L Final    Anion Gap 08/03/2024 10  7 - 15 mmol/L Final    Urea Nitrogen 08/03/2024 8.1  6.0 - 20.0 mg/dL Final    Creatinine 08/03/2024 0.61  0.51 - 0.95 mg/dL Final    GFR Estimate 08/03/2024 >90  >60 mL/min/1.73m2 Final    eGFR calculated using 2021 CKD-EPI equation.    Calcium 08/03/2024 8.9  8.8 - 10.4 mg/dL Final    Reference intervals for this test were updated on 7/16/2024 to reflect our  healthy population more accurately. There may be differences in the flagging of prior results with similar values performed with this method. Those prior results can be interpreted in the context of the updated reference intervals.    Glucose 08/03/2024 97  70 - 99 mg/dL Final    Troponin T, High Sensitivity 08/03/2024 <6  <=14 ng/L Final    Either a High Sensitivity Troponin T baseline (0 hours) value = 100 ng/L, or an increase in High Sensitivity Troponin T = 7 ng/L at 2 hours compared to 0 hours (2-0 hours), suggests myocardial injury, and urgent clinical attention is required.    If the 2-0 hours increase is <7 ng/L, a High Sensitivity Troponin T result above gender-specific reference ranges warrants further evaluation.   Recommendations for further evaluation include correlation with clinical decision-making tool (e.g., HEART), a 3rd High Sensitivity Troponin T test 2 hours after the 2nd (a 20% change from baseline would represent concern), admission for observation, close PCC/cardiology follow-up, or urgent outpatient provocative testing.    Magnesium 08/03/2024 1.7  1.7 - 2.3 mg/dL Final    TSH 08/03/2024 0.50  0.30 - 4.20 uIU/mL Final    Alcohol ethyl 08/03/2024 <0.01  <=0.01 g/dL Final    N terminal Pro BNP Inpatient 08/03/2024 86  0 - 450 pg/mL Final    Reference range shown and results flagged as abnormal are suggested inpatient cut points for confirming diagnosis if CHF in an acute setting. Establishing a baseline value for each individual patient is useful for follow-up. An inpatient or emergency department NT-proPBNP <300 pg/mL effectively rules out acute CHF, with 99% negative predictive value.    The outpatient non-acute reference range for ruling out CHF is:  0-125 pg/mL (age 18 to less than 75)  0-450 pg/mL (age 75 yrs and older)     Color Urine 08/03/2024 Colorless  Colorless, Straw, Light Yellow, Yellow Final    Appearance Urine 08/03/2024 Clear  Clear Final    Glucose Urine 08/03/2024 Negative   Negative mg/dL Final    Bilirubin Urine 08/03/2024 Negative  Negative Final    Ketones Urine 08/03/2024 Negative  Negative mg/dL Final    Specific Gravity Urine 08/03/2024 1.004  1.001 - 1.030 Final    Blood Urine 08/03/2024 Negative  Negative Final    pH Urine 08/03/2024 6.5  5.0 - 7.0 Final    Protein Albumin Urine 08/03/2024 Negative  Negative mg/dL Final    Urobilinogen Urine 08/03/2024 <2.0  <2.0 mg/dL Final    Nitrite Urine 08/03/2024 Negative  Negative Final    Leukocyte Esterase Urine 08/03/2024 Negative  Negative Final    Bacteria Urine 08/03/2024 Few (A)  None Seen /HPF Final    RBC Urine 08/03/2024 <1  <=2 /HPF Final    WBC Urine 08/03/2024 <1  <=5 /HPF Final    Squamous Epithelials Urine 08/03/2024 1  <=1 /HPF Final    Influenza A PCR 08/03/2024 Negative  Negative Final    Influenza B PCR 08/03/2024 Negative  Negative Final    RSV PCR 08/03/2024 Negative  Negative Final    SARS CoV2 PCR 08/03/2024 Negative  Negative Final    NEGATIVE: SARS-CoV-2 (COVID-19) RNA not detected, presumed negative.    D-Dimer Quantitative 08/03/2024 <0.27  0.00 - 0.50 ug/mL FEU Final    WBC Count 08/03/2024 9.7  4.0 - 11.0 10e3/uL Final    RBC Count 08/03/2024 4.32  3.80 - 5.20 10e6/uL Final    Hemoglobin 08/03/2024 13.0  11.7 - 15.7 g/dL Final    Hematocrit 08/03/2024 38.4  35.0 - 47.0 % Final    MCV 08/03/2024 89  78 - 100 fL Final    MCH 08/03/2024 30.1  26.5 - 33.0 pg Final    MCHC 08/03/2024 33.9  31.5 - 36.5 g/dL Final    RDW 08/03/2024 12.4  10.0 - 15.0 % Final    Platelet Count 08/03/2024 255  150 - 450 10e3/uL Final    % Neutrophils 08/03/2024 77  % Final    % Lymphocytes 08/03/2024 16  % Final    % Monocytes 08/03/2024 7  % Final    % Eosinophils 08/03/2024 0  % Final    % Basophils 08/03/2024 1  % Final    % Immature Granulocytes 08/03/2024 0  % Final    NRBCs per 100 WBC 08/03/2024 0  <1 /100 Final    Absolute Neutrophils 08/03/2024 7.5  1.6 - 8.3 10e3/uL Final    Absolute Lymphocytes 08/03/2024 1.5  0.8 - 5.3  10e3/uL Final    Absolute Monocytes 08/03/2024 0.7  0.0 - 1.3 10e3/uL Final    Absolute Eosinophils 08/03/2024 0.0  0.0 - 0.7 10e3/uL Final    Absolute Basophils 08/03/2024 0.1  0.0 - 0.2 10e3/uL Final    Absolute Immature Granulocytes 08/03/2024 0.0  <=0.4 10e3/uL Final    Absolute NRBCs 08/03/2024 0.0  10e3/uL Final    Amphetamines Urine 08/03/2024 Screen Negative  Screen Negative Final    Cutoff for a negative amphetamine is less than 500 ng/mL.    Barbituates Urine 08/03/2024 Screen Negative  Screen Negative Final    Cutoff for a negative barbiturate is less than 200 ng/mL.    Benzodiazepine Urine 08/03/2024 Screen Negative  Screen Negative Final    Cutoff for a negative benzodiazepine is less than 100 ng/mL.    Cannabinoids Urine 08/03/2024 Screen Negative  Screen Negative Final    Cutoff for a negative cannabinoid is less than 50 ng/mL.    Cocaine Urine 08/03/2024 Screen Negative  Screen Negative Final    Cutoff for a negative cocaine is less than 300 ng/mL.    Fentanyl Qual Urine 08/03/2024 Screen Negative  Screen Negative Final    Cutoff for negative fentanyl is less than 5 ng/mL.    Opiates Urine 08/03/2024 Screen Negative  Screen Negative Final    Cutoff for a negative opiate is less than 300 ng/mL.    PCP Urine 08/03/2024 Screen Negative  Screen Negative Final    Cutoff for a negative PCP is less than 25 ng/mL.   Lab Requisition on 07/12/2024   Component Date Value Ref Range Status    Culture 07/12/2024 No Growth   Final       Pertinent Radiology: known FGR, BPP and dopplers reassuring with MFM earlier today      Impression/Plan:  1. IUP at 32w5d weeks, cHTN:  -Patient has known cHTN with meds and elevated BP's <20 weeks.  Tonight she did have severe-range BP requiring acute treatment, but responded well to IR procardia  -Explained scenario thus far with patient and her mom.  Discussed that because her high BP's started <20 weeks this pregnancy, her diagnosis is c/w cHTN.  Explained that although she has  had protein in her urine at times, it has always been below the threshold to diagnose pre-eclampsia  -Explained that she is at higher risk for developing DAVID, but often it is difficult to differentiate acute exacerbation of cHTN vs pre-eclampsia.  Explained that watching her over time is helpful as an acute exacerbation of cHTN will improve with meds while DAVID often does not respond to meds.  Explained this is why when she presents for symptoms we have recommended observation so we can observe her over time, she has had trouble differentiating when she should come to the hospital.  -Explained that her XL procardia is not conducive to prn dosing at home.  Explained that this is a long acting medicine, so taking a higher dose one day when her BP's are high is not effective in the short-term.  Discussed that tonight she required additional 40 mg of IR procarda, thus would recommend she increase her daily dos to 30 mg BID of XL  -Discussed that based on her current diagnosis of severe FGR and cHTN, delivery is indicated at 34 0/7-37 weeks per [facility] recommendation.  -Discussed indications for delivery or admission sooner would be based on changes to her status.  For example if she did develop pre-eclampsia with severe features then admission would be recommended until delivery at 34 weeks.  Certain severe criteria (HELLP syndrome, uncontrolled BP, elevated creatinine, unrelenting HA) are possible indications for delivery<34 weeks  -Explained the testing that is being done to monitor baby, and that if changes in fetal status were identified then sometimes delivery is indicated <34 weeks.  Balance of weighing maternal risks of HTN vs fetal risks of FGR and prematurity  -Discussed that my recommendation would be for her to stay for observation since she did require acute treatment.  She was feeling much better on re-assessment this evening.  CP has completely resolved (CMP/BNP/troponins were reassuring).  HA has also  resolved with improvement in her BP  -She expresses feeling much more clarity about her plan of care and relieved to have a defined window for delivery.  Discussed she can deliver as early as 34 weeks which is still , vs close monitoring of pregnancy up to 37 6/7.  Discussed this window is based on her current diagnoses of FGR with concurrent cHTN.  -She strongly desires to deliver via RCS at 34 weeks, we did put her on the OR schedule for this date, but discussed will need to coordinate with our surgery schedulers regarding if Dr Maxwell is available to perform her  vs another partner.  -She is staying with her mom who is a nurse, reports she has a BP cuff at home and has a clear understanding of when to return to the hospital.  She has close interval follow-up with Dr Maxwell on Thursday and [facility] on Friday  -Objectively her labs were normal and UPC negative for protein, thus overall more consistent with acute exacerbation of cHTN  -Although I did recommend admission for observation, she strongly desired discharge to home.  She is reliable to follow-up and reports good understanding of what to watch for at home.    Patient discharged    Total time approximately 90 minutes including multiple visits to patient's bedside for discussion, chart review, and documentation on date of encounter       Provider: Jing Garcia MD    Date: 2025  Time: 7:29 PM    NIKOLAS Uriostegui 1995, MRN [id number]

## 2025-01-16 ENCOUNTER — ANESTHESIA (OUTPATIENT)
Dept: OBGYN | Facility: HOSPITAL | Age: 30
End: 2025-01-16
Payer: COMMERCIAL

## 2025-01-16 ENCOUNTER — ANESTHESIA EVENT (OUTPATIENT)
Dept: OBGYN | Facility: HOSPITAL | Age: 30
End: 2025-01-16
Payer: COMMERCIAL

## 2025-01-16 ENCOUNTER — HOSPITAL ENCOUNTER (INPATIENT)
Facility: HOSPITAL | Age: 30
End: 2025-01-16
Attending: OBSTETRICS & GYNECOLOGY | Admitting: OBSTETRICS & GYNECOLOGY
Payer: COMMERCIAL

## 2025-01-16 VITALS
SYSTOLIC BLOOD PRESSURE: 130 MMHG | WEIGHT: 205 LBS | RESPIRATION RATE: 28 BRPM | HEIGHT: 67 IN | HEART RATE: 105 BPM | OXYGEN SATURATION: 98 % | TEMPERATURE: 98.3 F | BODY MASS INDEX: 32.18 KG/M2 | DIASTOLIC BLOOD PRESSURE: 84 MMHG

## 2025-01-16 DIAGNOSIS — O13.3 PREGNANCY-INDUCED HYPERTENSION IN THIRD TRIMESTER: Primary | ICD-10-CM

## 2025-01-16 DIAGNOSIS — Z98.891 H/O CESAREAN SECTION: ICD-10-CM

## 2025-01-16 PROBLEM — O13.9 PIH (PREGNANCY INDUCED HYPERTENSION): Status: ACTIVE | Noted: 2025-01-16

## 2025-01-16 LAB
ABO + RH BLD: NORMAL
APTT PPP: 35 SECONDS (ref 22–38)
APTT PPP: 36 SECONDS (ref 22–38)
BLD GP AB SCN SERPL QL: NEGATIVE
BLD PROD TYP BPU: NORMAL
BLOOD COMPONENT TYPE: NORMAL
CODING SYSTEM: NORMAL
CROSSMATCH: NORMAL
D DIMER PPP FEU-MCNC: >20 UG/ML FEU (ref 0–0.5)
ERYTHROCYTE [DISTWIDTH] IN BLOOD BY AUTOMATED COUNT: 13.3 % (ref 10–15)
ERYTHROCYTE [DISTWIDTH] IN BLOOD BY AUTOMATED COUNT: 14 % (ref 10–15)
FIBRINOGEN PPP-MCNC: 309 MG/DL (ref 170–510)
FIBRINOGEN PPP-MCNC: 78 MG/DL (ref 170–510)
GLUCOSE BLDC GLUCOMTR-MCNC: 148 MG/DL (ref 70–99)
GLUCOSE BLDC GLUCOMTR-MCNC: 65 MG/DL (ref 70–99)
GLUCOSE BLDC GLUCOMTR-MCNC: 80 MG/DL (ref 70–99)
HCT VFR BLD AUTO: 22.5 % (ref 35–47)
HCT VFR BLD AUTO: 31 % (ref 35–47)
HGB BLD-MCNC: 10.6 G/DL (ref 11.7–15.7)
HGB BLD-MCNC: 7.6 G/DL (ref 11.7–15.7)
HGB BLD-MCNC: 9.6 G/DL (ref 11.7–15.7)
HOLD SPECIMEN: NORMAL
INR PPP: 1.13 (ref 0.85–1.15)
INR PPP: 1.67 (ref 0.85–1.15)
ISSUE DATE AND TIME: NORMAL
MCH RBC QN AUTO: 28 PG (ref 26.5–33)
MCH RBC QN AUTO: 28.6 PG (ref 26.5–33)
MCHC RBC AUTO-ENTMCNC: 33.8 G/DL (ref 31.5–36.5)
MCHC RBC AUTO-ENTMCNC: 34.2 G/DL (ref 31.5–36.5)
MCV RBC AUTO: 82 FL (ref 78–100)
MCV RBC AUTO: 85 FL (ref 78–100)
PLATELET # BLD AUTO: 156 10E3/UL (ref 150–450)
PLATELET # BLD AUTO: 210 10E3/UL (ref 150–450)
PLATELET # BLD AUTO: 211 10E3/UL (ref 150–450)
RBC # BLD AUTO: 2.66 10E6/UL (ref 3.8–5.2)
RBC # BLD AUTO: 3.78 10E6/UL (ref 3.8–5.2)
SPECIMEN EXP DATE BLD: NORMAL
T PALLIDUM AB SER QL: NONREACTIVE
UNIT ABO/RH: NORMAL
UNIT NUMBER: NORMAL
UNIT STATUS: NORMAL
UNIT TYPE ISBT: 6200
WBC # BLD AUTO: 14.8 10E3/UL (ref 4–11)
WBC # BLD AUTO: 7.6 10E3/UL (ref 4–11)

## 2025-01-16 PROCEDURE — 0W3R7ZZ CONTROL BLEEDING IN GENITOURINARY TRACT, VIA NATURAL OR ARTIFICIAL OPENING: ICD-10-PCS | Performed by: OBSTETRICS & GYNECOLOGY

## 2025-01-16 PROCEDURE — 88307 TISSUE EXAM BY PATHOLOGIST: CPT | Mod: TC | Performed by: OBSTETRICS & GYNECOLOGY

## 2025-01-16 PROCEDURE — 85730 THROMBOPLASTIN TIME PARTIAL: CPT | Performed by: OBSTETRICS & GYNECOLOGY

## 2025-01-16 PROCEDURE — 250N000011 HC RX IP 250 OP 636: Performed by: STUDENT IN AN ORGANIZED HEALTH CARE EDUCATION/TRAINING PROGRAM

## 2025-01-16 PROCEDURE — 85018 HEMOGLOBIN: CPT | Performed by: NURSE PRACTITIONER

## 2025-01-16 PROCEDURE — 360N000076 HC SURGERY LEVEL 3, PER MIN: Performed by: OBSTETRICS & GYNECOLOGY

## 2025-01-16 PROCEDURE — 258N000003 HC RX IP 258 OP 636: Performed by: STUDENT IN AN ORGANIZED HEALTH CARE EDUCATION/TRAINING PROGRAM

## 2025-01-16 PROCEDURE — 272N000001 HC OR GENERAL SUPPLY STERILE: Performed by: OBSTETRICS & GYNECOLOGY

## 2025-01-16 PROCEDURE — 250N000009 HC RX 250: Performed by: OBSTETRICS & GYNECOLOGY

## 2025-01-16 PROCEDURE — 258N000001 HC RX 258: Performed by: OBSTETRICS & GYNECOLOGY

## 2025-01-16 PROCEDURE — 200N000001 HC R&B ICU

## 2025-01-16 PROCEDURE — 250N000013 HC RX MED GY IP 250 OP 250 PS 637: Performed by: OBSTETRICS & GYNECOLOGY

## 2025-01-16 PROCEDURE — 86780 TREPONEMA PALLIDUM: CPT | Performed by: NURSE PRACTITIONER

## 2025-01-16 PROCEDURE — P9059 PLASMA, FRZ BETWEEN 8-24HOUR: HCPCS | Performed by: INTERNAL MEDICINE

## 2025-01-16 PROCEDURE — 710N000009 HC RECOVERY PHASE 1, LEVEL 1, PER MIN: Performed by: OBSTETRICS & GYNECOLOGY

## 2025-01-16 PROCEDURE — 250N000009 HC RX 250: Performed by: NURSE PRACTITIONER

## 2025-01-16 PROCEDURE — 85018 HEMOGLOBIN: CPT | Performed by: OBSTETRICS & GYNECOLOGY

## 2025-01-16 PROCEDURE — 250N000009 HC RX 250: Performed by: STUDENT IN AN ORGANIZED HEALTH CARE EDUCATION/TRAINING PROGRAM

## 2025-01-16 PROCEDURE — 99291 CRITICAL CARE FIRST HOUR: CPT | Performed by: INTERNAL MEDICINE

## 2025-01-16 PROCEDURE — 86923 COMPATIBILITY TEST ELECTRIC: CPT | Performed by: INTERNAL MEDICINE

## 2025-01-16 PROCEDURE — 86901 BLOOD TYPING SEROLOGIC RH(D): CPT | Performed by: NURSE PRACTITIONER

## 2025-01-16 PROCEDURE — 999N000016 HC STATISTIC ATTENDANCE AT DELIVERY

## 2025-01-16 PROCEDURE — 85384 FIBRINOGEN ACTIVITY: CPT | Performed by: OBSTETRICS & GYNECOLOGY

## 2025-01-16 PROCEDURE — 85379 FIBRIN DEGRADATION QUANT: CPT | Performed by: OBSTETRICS & GYNECOLOGY

## 2025-01-16 PROCEDURE — 250N000011 HC RX IP 250 OP 636: Performed by: NURSE ANESTHETIST, CERTIFIED REGISTERED

## 2025-01-16 PROCEDURE — 258N000003 HC RX IP 258 OP 636: Performed by: OBSTETRICS & GYNECOLOGY

## 2025-01-16 PROCEDURE — P9016 RBC LEUKOCYTES REDUCED: HCPCS | Performed by: OBSTETRICS & GYNECOLOGY

## 2025-01-16 PROCEDURE — 370N000017 HC ANESTHESIA TECHNICAL FEE, PER MIN: Performed by: OBSTETRICS & GYNECOLOGY

## 2025-01-16 PROCEDURE — 36415 COLL VENOUS BLD VENIPUNCTURE: CPT | Performed by: OBSTETRICS & GYNECOLOGY

## 2025-01-16 PROCEDURE — P9012 CRYOPRECIPITATE EACH UNIT: HCPCS | Performed by: INTERNAL MEDICINE

## 2025-01-16 PROCEDURE — 86923 COMPATIBILITY TEST ELECTRIC: CPT | Performed by: STUDENT IN AN ORGANIZED HEALTH CARE EDUCATION/TRAINING PROGRAM

## 2025-01-16 PROCEDURE — 250N000011 HC RX IP 250 OP 636: Performed by: OBSTETRICS & GYNECOLOGY

## 2025-01-16 PROCEDURE — 250N000011 HC RX IP 250 OP 636: Performed by: NURSE PRACTITIONER

## 2025-01-16 PROCEDURE — 99292 CRITICAL CARE ADDL 30 MIN: CPT | Performed by: INTERNAL MEDICINE

## 2025-01-16 PROCEDURE — 250N000025 HC SEVOFLURANE, PER MIN: Performed by: OBSTETRICS & GYNECOLOGY

## 2025-01-16 PROCEDURE — 250N000013 HC RX MED GY IP 250 OP 250 PS 637: Performed by: NURSE PRACTITIONER

## 2025-01-16 PROCEDURE — 86900 BLOOD TYPING SEROLOGIC ABO: CPT | Performed by: NURSE PRACTITIONER

## 2025-01-16 PROCEDURE — 88307 TISSUE EXAM BY PATHOLOGIST: CPT | Mod: 26 | Performed by: PATHOLOGY

## 2025-01-16 PROCEDURE — 36415 COLL VENOUS BLD VENIPUNCTURE: CPT | Performed by: NURSE PRACTITIONER

## 2025-01-16 PROCEDURE — 85014 HEMATOCRIT: CPT | Performed by: OBSTETRICS & GYNECOLOGY

## 2025-01-16 PROCEDURE — 85049 AUTOMATED PLATELET COUNT: CPT | Performed by: OBSTETRICS & GYNECOLOGY

## 2025-01-16 PROCEDURE — 85610 PROTHROMBIN TIME: CPT | Performed by: OBSTETRICS & GYNECOLOGY

## 2025-01-16 PROCEDURE — 86923 COMPATIBILITY TEST ELECTRIC: CPT | Performed by: OBSTETRICS & GYNECOLOGY

## 2025-01-16 PROCEDURE — 360N000074 HC SURGERY LEVEL 1, PER MIN: Performed by: OBSTETRICS & GYNECOLOGY

## 2025-01-16 PROCEDURE — 999N000249 HC STATISTIC C-SECTION ON UNIT

## 2025-01-16 RX ORDER — FENTANYL CITRATE-0.9 % NACL/PF 10 MCG/ML
PLASTIC BAG, INJECTION (ML) INTRAVENOUS CONTINUOUS PRN
Status: DISCONTINUED | OUTPATIENT
Start: 2025-01-16 | End: 2025-01-16

## 2025-01-16 RX ORDER — SODIUM CHLORIDE, SODIUM LACTATE, POTASSIUM CHLORIDE, CALCIUM CHLORIDE 600; 310; 30; 20 MG/100ML; MG/100ML; MG/100ML; MG/100ML
INJECTION, SOLUTION INTRAVENOUS CONTINUOUS
Status: DISCONTINUED | OUTPATIENT
Start: 2025-01-16 | End: 2025-01-20 | Stop reason: HOSPADM

## 2025-01-16 RX ORDER — NICOTINE POLACRILEX 4 MG
15-30 LOZENGE BUCCAL
Status: DISCONTINUED | OUTPATIENT
Start: 2025-01-16 | End: 2025-01-20 | Stop reason: HOSPADM

## 2025-01-16 RX ORDER — NIFEDIPINE 30 MG
30 TABLET, EXTENDED RELEASE ORAL 2 TIMES DAILY
Status: DISCONTINUED | OUTPATIENT
Start: 2025-01-16 | End: 2025-01-16

## 2025-01-16 RX ORDER — FENTANYL CITRATE 50 UG/ML
INJECTION, SOLUTION INTRAMUSCULAR; INTRAVENOUS
Status: COMPLETED
Start: 2025-01-16 | End: 2025-01-16

## 2025-01-16 RX ORDER — SODIUM CHLORIDE, SODIUM LACTATE, POTASSIUM CHLORIDE, CALCIUM CHLORIDE 600; 310; 30; 20 MG/100ML; MG/100ML; MG/100ML; MG/100ML
INJECTION, SOLUTION INTRAVENOUS CONTINUOUS
Status: DISCONTINUED | OUTPATIENT
Start: 2025-01-16 | End: 2025-01-17

## 2025-01-16 RX ORDER — IBUPROFEN 800 MG/1
800 TABLET, FILM COATED ORAL EVERY 6 HOURS
Status: DISCONTINUED | OUTPATIENT
Start: 2025-01-17 | End: 2025-01-20 | Stop reason: HOSPADM

## 2025-01-16 RX ORDER — LABETALOL HYDROCHLORIDE 5 MG/ML
20-40 INJECTION, SOLUTION INTRAVENOUS EVERY 10 MIN PRN
Status: DISCONTINUED | OUTPATIENT
Start: 2025-01-16 | End: 2025-01-20 | Stop reason: HOSPADM

## 2025-01-16 RX ORDER — LOPERAMIDE HYDROCHLORIDE 2 MG/1
4 CAPSULE ORAL
Status: DISCONTINUED | OUTPATIENT
Start: 2025-01-16 | End: 2025-01-20 | Stop reason: HOSPADM

## 2025-01-16 RX ORDER — DEXAMETHASONE SODIUM PHOSPHATE 4 MG/ML
4 INJECTION, SOLUTION INTRA-ARTICULAR; INTRALESIONAL; INTRAMUSCULAR; INTRAVENOUS; SOFT TISSUE
Status: DISCONTINUED | OUTPATIENT
Start: 2025-01-16 | End: 2025-01-16

## 2025-01-16 RX ORDER — FENTANYL CITRATE 50 UG/ML
50 INJECTION, SOLUTION INTRAMUSCULAR; INTRAVENOUS EVERY 5 MIN PRN
Status: CANCELLED | OUTPATIENT
Start: 2025-01-16

## 2025-01-16 RX ORDER — PROPOFOL 10 MG/ML
INJECTION, EMULSION INTRAVENOUS PRN
Status: DISCONTINUED | OUTPATIENT
Start: 2025-01-16 | End: 2025-01-16

## 2025-01-16 RX ORDER — NALOXONE HYDROCHLORIDE 0.4 MG/ML
0.1 INJECTION, SOLUTION INTRAMUSCULAR; INTRAVENOUS; SUBCUTANEOUS
Status: DISCONTINUED | OUTPATIENT
Start: 2025-01-16 | End: 2025-01-16

## 2025-01-16 RX ORDER — HYDROXYZINE HYDROCHLORIDE 50 MG/1
100 TABLET, FILM COATED ORAL 3 TIMES DAILY PRN
Status: DISCONTINUED | OUTPATIENT
Start: 2025-01-16 | End: 2025-01-20 | Stop reason: HOSPADM

## 2025-01-16 RX ORDER — CEFAZOLIN SODIUM/WATER 2 G/20 ML
2 SYRINGE (ML) INTRAVENOUS
Status: COMPLETED | OUTPATIENT
Start: 2025-01-16 | End: 2025-01-16

## 2025-01-16 RX ORDER — SIMETHICONE 80 MG
80 TABLET,CHEWABLE ORAL 4 TIMES DAILY PRN
Status: DISCONTINUED | OUTPATIENT
Start: 2025-01-16 | End: 2025-01-17

## 2025-01-16 RX ORDER — MISOPROSTOL 200 UG/1
400 TABLET ORAL
Status: DISCONTINUED | OUTPATIENT
Start: 2025-01-16 | End: 2025-01-20 | Stop reason: HOSPADM

## 2025-01-16 RX ORDER — NIFEDIPINE 30 MG
30 TABLET, EXTENDED RELEASE ORAL 2 TIMES DAILY
Status: DISCONTINUED | OUTPATIENT
Start: 2025-01-16 | End: 2025-01-20 | Stop reason: HOSPADM

## 2025-01-16 RX ORDER — CEFAZOLIN SODIUM/WATER 2 G/20 ML
2 SYRINGE (ML) INTRAVENOUS
Status: DISCONTINUED | OUTPATIENT
Start: 2025-01-16 | End: 2025-01-20 | Stop reason: HOSPADM

## 2025-01-16 RX ORDER — TRANEXAMIC ACID 10 MG/ML
1 INJECTION, SOLUTION INTRAVENOUS EVERY 30 MIN PRN
Status: DISCONTINUED | OUTPATIENT
Start: 2025-01-16 | End: 2025-01-20 | Stop reason: HOSPADM

## 2025-01-16 RX ORDER — TRANEXAMIC ACID 10 MG/ML
1 INJECTION, SOLUTION INTRAVENOUS EVERY 30 MIN PRN
Status: DISCONTINUED | OUTPATIENT
Start: 2025-01-16 | End: 2025-01-16

## 2025-01-16 RX ORDER — SODIUM PHOSPHATE,MONO-DIBASIC 19G-7G/118
1 ENEMA (ML) RECTAL DAILY PRN
Status: DISCONTINUED | OUTPATIENT
Start: 2025-01-18 | End: 2025-01-20 | Stop reason: HOSPADM

## 2025-01-16 RX ORDER — AMOXICILLIN 250 MG
2 CAPSULE ORAL 2 TIMES DAILY
Status: DISCONTINUED | OUTPATIENT
Start: 2025-01-16 | End: 2025-01-20 | Stop reason: HOSPADM

## 2025-01-16 RX ORDER — OXYTOCIN 10 [USP'U]/ML
10 INJECTION, SOLUTION INTRAMUSCULAR; INTRAVENOUS
Status: DISCONTINUED | OUTPATIENT
Start: 2025-01-16 | End: 2025-01-20 | Stop reason: HOSPADM

## 2025-01-16 RX ORDER — FENTANYL CITRATE 50 UG/ML
25 INJECTION, SOLUTION INTRAMUSCULAR; INTRAVENOUS EVERY 5 MIN PRN
Status: DISCONTINUED | OUTPATIENT
Start: 2025-01-16 | End: 2025-01-16

## 2025-01-16 RX ORDER — TRANEXAMIC ACID 10 MG/ML
1 INJECTION, SOLUTION INTRAVENOUS ONCE
Status: DISCONTINUED | OUTPATIENT
Start: 2025-01-16 | End: 2025-01-20 | Stop reason: HOSPADM

## 2025-01-16 RX ORDER — LABETALOL 100 MG/1
100 TABLET, FILM COATED ORAL 2 TIMES DAILY
Status: ON HOLD | COMMUNITY
End: 2025-01-20

## 2025-01-16 RX ORDER — LOPERAMIDE HYDROCHLORIDE 2 MG/1
2 CAPSULE ORAL
Status: DISCONTINUED | OUTPATIENT
Start: 2025-01-16 | End: 2025-01-20 | Stop reason: HOSPADM

## 2025-01-16 RX ORDER — SODIUM CHLORIDE, SODIUM LACTATE, POTASSIUM CHLORIDE, CALCIUM CHLORIDE 600; 310; 30; 20 MG/100ML; MG/100ML; MG/100ML; MG/100ML
INJECTION, SOLUTION INTRAVENOUS CONTINUOUS
Status: DISCONTINUED | OUTPATIENT
Start: 2025-01-16 | End: 2025-01-16

## 2025-01-16 RX ORDER — HYDRALAZINE HYDROCHLORIDE 20 MG/ML
5-10 INJECTION INTRAMUSCULAR; INTRAVENOUS
Status: DISCONTINUED | OUTPATIENT
Start: 2025-01-16 | End: 2025-01-20 | Stop reason: HOSPADM

## 2025-01-16 RX ORDER — HYDROMORPHONE HYDROCHLORIDE 1 MG/ML
0.2 INJECTION, SOLUTION INTRAMUSCULAR; INTRAVENOUS; SUBCUTANEOUS EVERY 5 MIN PRN
Status: DISCONTINUED | OUTPATIENT
Start: 2025-01-16 | End: 2025-01-16

## 2025-01-16 RX ORDER — MISOPROSTOL 200 UG/1
400 TABLET ORAL
Status: DISCONTINUED | OUTPATIENT
Start: 2025-01-16 | End: 2025-01-16

## 2025-01-16 RX ORDER — LOPERAMIDE HYDROCHLORIDE 2 MG/1
4 CAPSULE ORAL
Status: DISCONTINUED | OUTPATIENT
Start: 2025-01-16 | End: 2025-01-16

## 2025-01-16 RX ORDER — OXYTOCIN/0.9 % SODIUM CHLORIDE 30/500 ML
340 PLASTIC BAG, INJECTION (ML) INTRAVENOUS CONTINUOUS PRN
Status: DISCONTINUED | OUTPATIENT
Start: 2025-01-16 | End: 2025-01-20 | Stop reason: HOSPADM

## 2025-01-16 RX ORDER — OXYTOCIN 10 [USP'U]/ML
10 INJECTION, SOLUTION INTRAMUSCULAR; INTRAVENOUS
Status: DISCONTINUED | OUTPATIENT
Start: 2025-01-16 | End: 2025-01-16

## 2025-01-16 RX ORDER — DEXTROAMPHETAMINE SACCHARATE, AMPHETAMINE ASPARTATE MONOHYDRATE, DEXTROAMPHETAMINE SULFATE AND AMPHETAMINE SULFATE 3.75; 3.75; 3.75; 3.75 MG/1; MG/1; MG/1; MG/1
30 CAPSULE, EXTENDED RELEASE ORAL DAILY
Status: DISCONTINUED | OUTPATIENT
Start: 2025-01-16 | End: 2025-01-20 | Stop reason: HOSPADM

## 2025-01-16 RX ORDER — MORPHINE SULFATE 1 MG/ML
INJECTION, SOLUTION EPIDURAL; INTRATHECAL; INTRAVENOUS
Status: COMPLETED | OUTPATIENT
Start: 2025-01-16 | End: 2025-01-16

## 2025-01-16 RX ORDER — BISACODYL 10 MG
10 SUPPOSITORY, RECTAL RECTAL DAILY PRN
Status: DISCONTINUED | OUTPATIENT
Start: 2025-01-18 | End: 2025-01-20 | Stop reason: HOSPADM

## 2025-01-16 RX ORDER — METOCLOPRAMIDE 10 MG/1
10 TABLET ORAL EVERY 6 HOURS PRN
Status: DISCONTINUED | OUTPATIENT
Start: 2025-01-16 | End: 2025-01-20 | Stop reason: HOSPADM

## 2025-01-16 RX ORDER — LIDOCAINE 40 MG/G
CREAM TOPICAL
Status: DISCONTINUED | OUTPATIENT
Start: 2025-01-16 | End: 2025-01-20 | Stop reason: HOSPADM

## 2025-01-16 RX ORDER — MISOPROSTOL 200 UG/1
800 TABLET ORAL
Status: DISCONTINUED | OUTPATIENT
Start: 2025-01-16 | End: 2025-01-16

## 2025-01-16 RX ORDER — KETOROLAC TROMETHAMINE 30 MG/ML
INJECTION, SOLUTION INTRAMUSCULAR; INTRAVENOUS PRN
Status: DISCONTINUED | OUTPATIENT
Start: 2025-01-16 | End: 2025-01-16

## 2025-01-16 RX ORDER — ACETAMINOPHEN 325 MG/1
975 TABLET ORAL EVERY 6 HOURS
Status: DISCONTINUED | OUTPATIENT
Start: 2025-01-16 | End: 2025-01-16

## 2025-01-16 RX ORDER — METHYLERGONOVINE MALEATE 0.2 MG/ML
200 INJECTION INTRAVENOUS
Status: DISCONTINUED | OUTPATIENT
Start: 2025-01-16 | End: 2025-01-20 | Stop reason: HOSPADM

## 2025-01-16 RX ORDER — LOPERAMIDE HYDROCHLORIDE 2 MG/1
2 CAPSULE ORAL
Status: DISCONTINUED | OUTPATIENT
Start: 2025-01-16 | End: 2025-01-16

## 2025-01-16 RX ORDER — ACETAMINOPHEN 325 MG/1
975 TABLET ORAL ONCE
Status: COMPLETED | OUTPATIENT
Start: 2025-01-16 | End: 2025-01-16

## 2025-01-16 RX ORDER — FENTANYL CITRATE 50 UG/ML
INJECTION, SOLUTION INTRAMUSCULAR; INTRAVENOUS PRN
Status: DISCONTINUED | OUTPATIENT
Start: 2025-01-16 | End: 2025-01-16

## 2025-01-16 RX ORDER — HYDROMORPHONE HYDROCHLORIDE 1 MG/ML
.5-1 INJECTION, SOLUTION INTRAMUSCULAR; INTRAVENOUS; SUBCUTANEOUS EVERY 4 HOURS PRN
Status: DISCONTINUED | OUTPATIENT
Start: 2025-01-16 | End: 2025-01-18

## 2025-01-16 RX ORDER — ONDANSETRON 2 MG/ML
4 INJECTION INTRAMUSCULAR; INTRAVENOUS EVERY 6 HOURS PRN
Status: DISCONTINUED | OUTPATIENT
Start: 2025-01-16 | End: 2025-01-20 | Stop reason: HOSPADM

## 2025-01-16 RX ORDER — MORPHINE SULFATE 0.5 MG/ML
150 INJECTION, SOLUTION EPIDURAL; INTRATHECAL; INTRAVENOUS ONCE
Status: DISCONTINUED | OUTPATIENT
Start: 2025-01-16 | End: 2025-01-20 | Stop reason: HOSPADM

## 2025-01-16 RX ORDER — KETOROLAC TROMETHAMINE 30 MG/ML
30 INJECTION, SOLUTION INTRAMUSCULAR; INTRAVENOUS EVERY 6 HOURS
Status: DISPENSED | OUTPATIENT
Start: 2025-01-16 | End: 2025-01-17

## 2025-01-16 RX ORDER — ACETAMINOPHEN 325 MG/1
975 TABLET ORAL EVERY 6 HOURS
Status: DISCONTINUED | OUTPATIENT
Start: 2025-01-16 | End: 2025-01-20 | Stop reason: HOSPADM

## 2025-01-16 RX ORDER — TRANEXAMIC ACID 10 MG/ML
1 INJECTION, SOLUTION INTRAVENOUS ONCE
Status: COMPLETED | OUTPATIENT
Start: 2025-01-16 | End: 2025-01-16

## 2025-01-16 RX ORDER — BUPIVACAINE HYDROCHLORIDE 2.5 MG/ML
INJECTION, SOLUTION EPIDURAL; INFILTRATION; INTRACAUDAL
Status: DISCONTINUED | OUTPATIENT
Start: 2025-01-16 | End: 2025-01-16

## 2025-01-16 RX ORDER — CEFAZOLIN SODIUM/WATER 2 G/20 ML
2 SYRINGE (ML) INTRAVENOUS SEE ADMIN INSTRUCTIONS
Status: DISCONTINUED | OUTPATIENT
Start: 2025-01-16 | End: 2025-01-16

## 2025-01-16 RX ORDER — DEXTROSE, SODIUM CHLORIDE, SODIUM LACTATE, POTASSIUM CHLORIDE, AND CALCIUM CHLORIDE 5; .6; .31; .03; .02 G/100ML; G/100ML; G/100ML; G/100ML; G/100ML
INJECTION, SOLUTION INTRAVENOUS CONTINUOUS
Status: DISCONTINUED | OUTPATIENT
Start: 2025-01-16 | End: 2025-01-17

## 2025-01-16 RX ORDER — LIDOCAINE 40 MG/G
CREAM TOPICAL
Status: DISCONTINUED | OUTPATIENT
Start: 2025-01-16 | End: 2025-01-16

## 2025-01-16 RX ORDER — FLUMAZENIL 0.1 MG/ML
0.2 INJECTION, SOLUTION INTRAVENOUS
Status: DISCONTINUED | OUTPATIENT
Start: 2025-01-16 | End: 2025-01-20 | Stop reason: HOSPADM

## 2025-01-16 RX ORDER — HYDROCORTISONE 25 MG/G
CREAM TOPICAL 3 TIMES DAILY PRN
Status: DISCONTINUED | OUTPATIENT
Start: 2025-01-16 | End: 2025-01-20 | Stop reason: HOSPADM

## 2025-01-16 RX ORDER — METHYLERGONOVINE MALEATE 0.2 MG/ML
200 INJECTION INTRAVENOUS
Status: DISCONTINUED | OUTPATIENT
Start: 2025-01-16 | End: 2025-01-16

## 2025-01-16 RX ORDER — OXYCODONE HYDROCHLORIDE 5 MG/1
5 TABLET ORAL EVERY 4 HOURS PRN
Status: DISCONTINUED | OUTPATIENT
Start: 2025-01-16 | End: 2025-01-17

## 2025-01-16 RX ORDER — CARBOPROST TROMETHAMINE 250 UG/ML
250 INJECTION, SOLUTION INTRAMUSCULAR
Status: DISCONTINUED | OUTPATIENT
Start: 2025-01-16 | End: 2025-01-16

## 2025-01-16 RX ORDER — PROCHLORPERAZINE MALEATE 10 MG
10 TABLET ORAL EVERY 6 HOURS PRN
Status: DISCONTINUED | OUTPATIENT
Start: 2025-01-16 | End: 2025-01-20 | Stop reason: HOSPADM

## 2025-01-16 RX ORDER — OXYTOCIN/0.9 % SODIUM CHLORIDE 30/500 ML
100-340 PLASTIC BAG, INJECTION (ML) INTRAVENOUS CONTINUOUS PRN
Status: DISCONTINUED | OUTPATIENT
Start: 2025-01-16 | End: 2025-01-16

## 2025-01-16 RX ORDER — SODIUM CHLORIDE, SODIUM LACTATE, POTASSIUM CHLORIDE, CALCIUM CHLORIDE 600; 310; 30; 20 MG/100ML; MG/100ML; MG/100ML; MG/100ML
10-125 INJECTION, SOLUTION INTRAVENOUS CONTINUOUS
Status: DISCONTINUED | OUTPATIENT
Start: 2025-01-16 | End: 2025-01-17

## 2025-01-16 RX ORDER — HYDROMORPHONE HYDROCHLORIDE 1 MG/ML
0.4 INJECTION, SOLUTION INTRAMUSCULAR; INTRAVENOUS; SUBCUTANEOUS EVERY 5 MIN PRN
Status: CANCELLED | OUTPATIENT
Start: 2025-01-16

## 2025-01-16 RX ORDER — BUPIVACAINE HYDROCHLORIDE 7.5 MG/ML
INJECTION, SOLUTION INTRASPINAL
Status: COMPLETED | OUTPATIENT
Start: 2025-01-16 | End: 2025-01-16

## 2025-01-16 RX ORDER — CITRIC ACID/SODIUM CITRATE 334-500MG
30 SOLUTION, ORAL ORAL
Status: COMPLETED | OUTPATIENT
Start: 2025-01-16 | End: 2025-01-16

## 2025-01-16 RX ORDER — AMOXICILLIN 250 MG
1 CAPSULE ORAL 2 TIMES DAILY
Status: DISCONTINUED | OUTPATIENT
Start: 2025-01-16 | End: 2025-01-20 | Stop reason: HOSPADM

## 2025-01-16 RX ORDER — ONDANSETRON 2 MG/ML
INJECTION INTRAMUSCULAR; INTRAVENOUS PRN
Status: DISCONTINUED | OUTPATIENT
Start: 2025-01-16 | End: 2025-01-16

## 2025-01-16 RX ORDER — ONDANSETRON 4 MG/1
4 TABLET, ORALLY DISINTEGRATING ORAL EVERY 6 HOURS PRN
Status: DISCONTINUED | OUTPATIENT
Start: 2025-01-16 | End: 2025-01-20 | Stop reason: HOSPADM

## 2025-01-16 RX ORDER — DEXAMETHASONE SODIUM PHOSPHATE 10 MG/ML
INJECTION, SOLUTION INTRAMUSCULAR; INTRAVENOUS PRN
Status: DISCONTINUED | OUTPATIENT
Start: 2025-01-16 | End: 2025-01-16

## 2025-01-16 RX ORDER — DEXTROSE MONOHYDRATE 25 G/50ML
25-50 INJECTION, SOLUTION INTRAVENOUS
Status: DISCONTINUED | OUTPATIENT
Start: 2025-01-16 | End: 2025-01-20 | Stop reason: HOSPADM

## 2025-01-16 RX ORDER — FENTANYL CITRATE 50 UG/ML
INJECTION, SOLUTION INTRAMUSCULAR; INTRAVENOUS
Status: COMPLETED | OUTPATIENT
Start: 2025-01-16 | End: 2025-01-16

## 2025-01-16 RX ORDER — CARBOPROST TROMETHAMINE 250 UG/ML
250 INJECTION, SOLUTION INTRAMUSCULAR
Status: DISCONTINUED | OUTPATIENT
Start: 2025-01-16 | End: 2025-01-20 | Stop reason: HOSPADM

## 2025-01-16 RX ORDER — MODIFIED LANOLIN
OINTMENT (GRAM) TOPICAL
Status: DISCONTINUED | OUTPATIENT
Start: 2025-01-16 | End: 2025-01-20 | Stop reason: HOSPADM

## 2025-01-16 RX ORDER — OXYTOCIN/0.9 % SODIUM CHLORIDE 30/500 ML
340 PLASTIC BAG, INJECTION (ML) INTRAVENOUS CONTINUOUS PRN
Status: DISCONTINUED | OUTPATIENT
Start: 2025-01-16 | End: 2025-01-16

## 2025-01-16 RX ORDER — ESCITALOPRAM OXALATE 10 MG/1
20 TABLET ORAL DAILY
Status: DISCONTINUED | OUTPATIENT
Start: 2025-01-17 | End: 2025-01-20 | Stop reason: HOSPADM

## 2025-01-16 RX ORDER — MISOPROSTOL 200 UG/1
800 TABLET ORAL
Status: DISCONTINUED | OUTPATIENT
Start: 2025-01-16 | End: 2025-01-20 | Stop reason: HOSPADM

## 2025-01-16 RX ORDER — METOCLOPRAMIDE HYDROCHLORIDE 5 MG/ML
10 INJECTION INTRAMUSCULAR; INTRAVENOUS EVERY 6 HOURS PRN
Status: DISCONTINUED | OUTPATIENT
Start: 2025-01-16 | End: 2025-01-20 | Stop reason: HOSPADM

## 2025-01-16 RX ORDER — NALOXONE HYDROCHLORIDE 0.4 MG/ML
0.1 INJECTION, SOLUTION INTRAMUSCULAR; INTRAVENOUS; SUBCUTANEOUS
Status: DISCONTINUED | OUTPATIENT
Start: 2025-01-16 | End: 2025-01-17

## 2025-01-16 RX ADMIN — SODIUM CHLORIDE, SODIUM LACTATE, POTASSIUM CHLORIDE, CALCIUM CHLORIDE AND DEXTROSE MONOHYDRATE: 5; 600; 310; 30; 20 INJECTION, SOLUTION INTRAVENOUS at 17:27

## 2025-01-16 RX ADMIN — FENTANYL CITRATE 10 MCG: 50 INJECTION, SOLUTION INTRAMUSCULAR; INTRAVENOUS at 13:13

## 2025-01-16 RX ADMIN — SODIUM CHLORIDE, POTASSIUM CHLORIDE, SODIUM LACTATE AND CALCIUM CHLORIDE: 600; 310; 30; 20 INJECTION, SOLUTION INTRAVENOUS at 20:51

## 2025-01-16 RX ADMIN — Medication 2 G: at 13:10

## 2025-01-16 RX ADMIN — HYDROMORPHONE HYDROCHLORIDE 0.5 MG: 1 INJECTION, SOLUTION INTRAMUSCULAR; INTRAVENOUS; SUBCUTANEOUS at 22:54

## 2025-01-16 RX ADMIN — ACETAMINOPHEN 975 MG: 325 TABLET ORAL at 23:41

## 2025-01-16 RX ADMIN — Medication 100 MG: at 18:35

## 2025-01-16 RX ADMIN — HYDROXYZINE HYDROCHLORIDE 100 MG: 50 TABLET ORAL at 23:48

## 2025-01-16 RX ADMIN — ONDANSETRON 4 MG: 2 INJECTION INTRAMUSCULAR; INTRAVENOUS at 13:07

## 2025-01-16 RX ADMIN — Medication 300 ML/HR: at 13:34

## 2025-01-16 RX ADMIN — Medication 0.15 MG: at 13:13

## 2025-01-16 RX ADMIN — HYDROMORPHONE HYDROCHLORIDE 0.5 MG: 1 INJECTION, SOLUTION INTRAMUSCULAR; INTRAVENOUS; SUBCUTANEOUS at 21:34

## 2025-01-16 RX ADMIN — METOCLOPRAMIDE 10 MG: 5 INJECTION, SOLUTION INTRAMUSCULAR; INTRAVENOUS at 17:34

## 2025-01-16 RX ADMIN — OXYCODONE HYDROCHLORIDE 5 MG: 5 TABLET ORAL at 16:27

## 2025-01-16 RX ADMIN — SODIUM CHLORIDE, POTASSIUM CHLORIDE, SODIUM LACTATE AND CALCIUM CHLORIDE: 600; 310; 30; 20 INJECTION, SOLUTION INTRAVENOUS at 23:38

## 2025-01-16 RX ADMIN — SODIUM CHLORIDE, POTASSIUM CHLORIDE, SODIUM LACTATE AND CALCIUM CHLORIDE: 600; 310; 30; 20 INJECTION, SOLUTION INTRAVENOUS at 18:32

## 2025-01-16 RX ADMIN — KETOROLAC TROMETHAMINE 30 MG: 30 INJECTION, SOLUTION INTRAMUSCULAR at 14:22

## 2025-01-16 RX ADMIN — Medication 100 ML/HR: at 15:45

## 2025-01-16 RX ADMIN — FENTANYL CITRATE 25 MCG: 50 INJECTION, SOLUTION INTRAMUSCULAR; INTRAVENOUS at 18:35

## 2025-01-16 RX ADMIN — Medication 20 MCG/MIN: at 13:10

## 2025-01-16 RX ADMIN — FENTANYL CITRATE 50 MCG: 50 INJECTION, SOLUTION INTRAMUSCULAR; INTRAVENOUS at 18:55

## 2025-01-16 RX ADMIN — MISOPROSTOL 400 MCG: 200 TABLET ORAL at 15:57

## 2025-01-16 RX ADMIN — FENTANYL CITRATE 25 MCG: 50 INJECTION, SOLUTION INTRAMUSCULAR; INTRAVENOUS at 15:28

## 2025-01-16 RX ADMIN — HYDROXYZINE HYDROCHLORIDE 100 MG: 50 TABLET ORAL at 16:27

## 2025-01-16 RX ADMIN — NIFEDIPINE 30 MG: 30 TABLET, EXTENDED RELEASE ORAL at 16:29

## 2025-01-16 RX ADMIN — SODIUM CHLORIDE, POTASSIUM CHLORIDE, SODIUM LACTATE AND CALCIUM CHLORIDE: 600; 310; 30; 20 INJECTION, SOLUTION INTRAVENOUS at 11:44

## 2025-01-16 RX ADMIN — TRANEXAMIC ACID 1 G: 10 INJECTION, SOLUTION INTRAVENOUS at 15:52

## 2025-01-16 RX ADMIN — DEXTROSE MONOHYDRATE 25 ML: 25 INJECTION, SOLUTION INTRAVENOUS at 12:48

## 2025-01-16 RX ADMIN — DEXMEDETOMIDINE HYDROCHLORIDE 4 MCG: 100 INJECTION, SOLUTION INTRAVENOUS at 13:43

## 2025-01-16 RX ADMIN — BUPIVACAINE HYDROCHLORIDE IN DEXTROSE 1.4 ML: 7.5 INJECTION, SOLUTION SUBARACHNOID at 13:13

## 2025-01-16 RX ADMIN — DEXMEDETOMIDINE HYDROCHLORIDE 8 MCG: 100 INJECTION, SOLUTION INTRAVENOUS at 13:36

## 2025-01-16 RX ADMIN — BUPIVACAINE HYDROCHLORIDE 20 ML: 2.5 INJECTION, SOLUTION EPIDURAL; INFILTRATION; INTRACAUDAL at 14:25

## 2025-01-16 RX ADMIN — BUPIVACAINE 20 ML: 13.3 INJECTION, SUSPENSION, LIPOSOMAL INFILTRATION at 14:25

## 2025-01-16 RX ADMIN — FENTANYL CITRATE 25 MCG: 50 INJECTION, SOLUTION INTRAMUSCULAR; INTRAVENOUS at 15:38

## 2025-01-16 RX ADMIN — DEXAMETHASONE SODIUM PHOSPHATE 10 MG: 10 INJECTION, SOLUTION INTRAMUSCULAR; INTRAVENOUS at 13:37

## 2025-01-16 RX ADMIN — PROPOFOL 180 MG: 10 INJECTION, EMULSION INTRAVENOUS at 18:35

## 2025-01-16 RX ADMIN — DEXMEDETOMIDINE HYDROCHLORIDE 8 MCG: 100 INJECTION, SOLUTION INTRAVENOUS at 13:39

## 2025-01-16 RX ADMIN — HYDROMORPHONE HYDROCHLORIDE 0.5 MG: 1 INJECTION, SOLUTION INTRAMUSCULAR; INTRAVENOUS; SUBCUTANEOUS at 17:28

## 2025-01-16 RX ADMIN — ACETAMINOPHEN 975 MG: 325 TABLET ORAL at 12:12

## 2025-01-16 RX ADMIN — HYDROMORPHONE HYDROCHLORIDE 0.5 MG: 1 INJECTION, SOLUTION INTRAMUSCULAR; INTRAVENOUS; SUBCUTANEOUS at 17:41

## 2025-01-16 RX ADMIN — HYDRALAZINE HYDROCHLORIDE 5 MG: 20 INJECTION INTRAMUSCULAR; INTRAVENOUS at 17:53

## 2025-01-16 RX ADMIN — FENTANYL CITRATE 25 MCG: 50 INJECTION, SOLUTION INTRAMUSCULAR; INTRAVENOUS at 19:15

## 2025-01-16 RX ADMIN — SODIUM CITRATE AND CITRIC ACID MONOHYDRATE 30 ML: 500; 334 SOLUTION ORAL at 12:12

## 2025-01-16 ASSESSMENT — ACTIVITIES OF DAILY LIVING (ADL)
ADLS_ACUITY_SCORE: 22

## 2025-01-16 NOTE — ANESTHESIA PROCEDURE NOTES
"Intrathecal injection Procedure Note    Pre-Procedure   Staff -        Anesthesiologist:  Prem Barnes MD       Performed By: anesthesiologist       Location: OB       Procedure Start/Stop Times: 1/16/2025 1:13 PM       Pre-Anesthestic Checklist: patient identified, IV checked, risks and benefits discussed, informed consent, monitors and equipment checked, pre-op evaluation, at physician/surgeon's request and post-op pain management  Timeout:       Correct Patient: Yes        Correct Procedure: Yes        Correct Site: Yes        Correct Position: Yes   Procedure Documentation  Procedure: intrathecal injection         Patient Position: sitting       Skin prep: Chloraprep       Insertion Site: L3-4. (midline approach).       Needle Gauge: 25.        Needle Length (Inches): 3.5        Spinal Needle Type: Pencan       Introducer used       Introducer: 20 G       # of attempts: 1 and  # of redirects:  0    Assessment/Narrative         Paresthesias: No.       CSF fluid: clear.       Opening pressure was cmH2O while  Sitting.      Medication(s) Administered   0.75% Hyperbaric Bupivacaine (Intrathecal) - Intrathecal   1.4 mL - 1/16/2025 1:13:00 PM  Morphine PF 1 mg/mL (Intrathecal) - Intrathecal   0.15 mg - 1/16/2025 1:13:00 PM  Fentanyl PF (Intrathecal) - Intrathecal   10 mcg - 1/16/2025 1:13:00 PM  Medication Administration Time: 1/16/2025 1:13 PM      FOR [facility] ([facility]) ONLY:   Pain Team Contact information: please page the Pain Team Via [employer]. Search \"Pain\". During daytime hours, please page the attending first. At night please page the resident first.      "

## 2025-01-16 NOTE — PROGRESS NOTES
Data: Shital Tamayo a 29 year old  34w0d [facility]: 2025 11:05 AM. Patient admitted for induction for IUGR, gestational hypertension, and scheduled repeat . Prenatal record reviewed. Pregnancy  has been complicated by gestational hypertension and IUGR.  Gestational Age 34w0d. VSS. Fetal movement present. Patient reports none. Patient denies uterine contractions, leaking of vaginal fluid/rupture of membranes, vaginal bleeding, abdominal pain, pelvic pressure, nausea, vomiting, headache, visual disturbances, epigastric or URQ pain, significant edema. Support person is present.   Action: Verbal consent for EFM.  Admission assessment completed. Bill of rights reviewed.  Response: Category 1 FHR.Patient verbalized agreement with plan. Will contact Dr Ruddy Maxwell with update and to obtain further orders.    Lance Menard RN  2025 12:36 PM

## 2025-01-16 NOTE — ANESTHESIA CARE TRANSFER NOTE
Patient: Shital Tamayo    Procedure: Procedure(s):  REPEAT  SECTION       Diagnosis: History of  section [Z98.891]  Fetal growth restriction with weight unknown [P05.9]  Diagnosis Additional Information: No value filed.    Anesthesia Type:   No value filed.     Note:    Oropharynx: oropharynx clear of all foreign objects and spontaneously breathing  Level of Consciousness: awake  Oxygen Supplementation: room air    Independent Airway: airway patency satisfactory and stable  Dentition: dentition unchanged  Vital Signs Stable: post-procedure vital signs reviewed and stable  Report to RN Given: handoff report given  Patient transferred to: Labor and Delivery    Handoff Report: Identifed the Patient, Identified the Reponsible Provider, Reviewed the pertinent medical history, Discussed the surgical course, Reviewed Intra-OP anesthesia mangement and issues during anesthesia, Set expectations for post-procedure period and Allowed opportunity for questions and acknowledgement of understanding      Vitals:  Vitals Value Taken Time   /82 25 1433   Temp 98    Pulse 88    Resp     SpO2 100 % 25 1433   Vitals shown include unfiled device data.    Electronically Signed By: KYLE Frances CRNA  2025  2:39 PM

## 2025-01-16 NOTE — PROGRESS NOTES
1755 Less at bedside to evaluate pain, bleeding, and blood pressures.  Give IV BP treatment now after one severe range BP    1805: Patient gave consent to attempt Margaret placement in room    1810: Patient in position to place Margaret, 200 ml blood and clots in drape. Unable to place Margaret without anesthesia    1824: Consent signed for exam under general anesthesia and possible D&C with Margaret placement    1828 out of room

## 2025-01-16 NOTE — PROGRESS NOTES
Pre op blood glucose 65, MDA notified. 25 ml of dextrose given. Rechecked blood glucose is 80. Given remaining dose of dextrose syringe to CRNA, report given to CRNA.    Lance Menard RN

## 2025-01-16 NOTE — PROGRESS NOTES
Assumed care at 1520.    Utox is not indicated under hospital's new policy for historical opioid use. Charge RN, NICU, and provider aware, order discontinued    Patient expressed feeling gush of blood and increase in pain. Clots expressed, Dr. Falk called to evaluate at bedside. IV Fentanyl given x2, provider preformed internal exam. OB hemorrhage order set placed.    Blood Pressures elevated, reviewed PTA meds with Provider. Received verbal order to give scheduled nifedipine now.    Oxycodone and Vistaril given for increased pain.

## 2025-01-16 NOTE — ANESTHESIA PROCEDURE NOTES
"TAP Procedure Note    Pre-Procedure   Staff -        Anesthesiologist:  Prem Barnes MD       Performed By: anesthesiologist       Procedure Start/Stop Times: 1/16/2025 2:25 PM       Pre-Anesthestic Checklist: patient identified, IV checked, site marked, risks and benefits discussed, informed consent, monitors and equipment checked, pre-op evaluation, at physician/surgeon's request and post-op pain management  Timeout:       Correct Patient: Yes        Correct Procedure: Yes        Correct Site: Yes        Correct Position: Yes        Correct Laterality: Yes        Site Marked: Yes  Procedure Documentation  Procedure: TAP         Laterality: bilateral       Patient Position: supine       Ultrasound guided       1. Ultrasound was used to identify targeted nerve, plexus, vascular marker, or fascial plane and place a needle adjacent to it in real-time.       2. Ultrasound was used to visualize the spread of anesthetic in close proximity to the above referenced structure.       3. A permanent image is entered into the patient's record.       4. The visualized anatomic structures appeared normal.       5. There were no apparent abnormal pathologic findings.    Assessment/Narrative         The placement was negative for: blood aspirated, painful injection and site bleeding       Paresthesias: No.       Bolus given via needle. no blood aspirated via catheter.        Secured via.        Insertion/Infusion Method: Single Shot       Complications: none       Injection made incrementally with aspirations every 5 mL.    Medication(s) Administered   Bupivacaine 0.25% PF (Infiltration) - Infiltration   20 mL - 1/16/2025 2:25:00 PM  Bupivacaine liposome (Exparel) 1.3% LA inj susp (Infiltration) - Infiltration   20 mL - 1/16/2025 2:25:00 PM  Medication Administration Time: 1/16/2025 2:25 PM      FOR [facility] ([facility]) ONLY:   Pain Team Contact information: please page the Pain Team Via [employer]. Search \"Pain\". During daytime " hours, please page the attending first. At night please page the resident first.

## 2025-01-16 NOTE — H&P
OB ADMISSION H&P - C SECTION     Date: 2025  NAME: Shital Tamayo   : 1995  MRN: [id number]     CC: scheduled c section      HPI: Shital Tamayo is a 29 year old  with  single inter-uterine gestation at 34w0d, with Estimated Date of Delivery: 2025 admitted today for repeat c section.  section secondary to repeat, 34 weeks, severe IUGR, chronic hypertension Patient feels well. Has no complaints and reports good fetal movement. Pregnancy has been complicated by Severe IUGR. Patient denies headache, visual changes, RUQ pain. She denies contractions, leakage of fluid, or vaginal bleeding. Please see prenatal records.     OB HISTORY   OB History    Para Term  AB Living   3 1 0 1 1 1   SAB IAB Ectopic Multiple Live Births   0 0 0 0 1      # Outcome Date GA Lbr Michele/2nd Weight Sex Type Anes PTL Lv   3 Current            2  22 33w2d  1.69 kg (3 lb 11.6 oz) M CS-LTranv Spinal  BESS      Complications: Preeclampsia/Hypertension      Name: ZENIA,MALE-SHITAL      Apgar1: 8  Apgar5: 9   1 AB 19               PAST MEDICAL HISTORY  Past Medical History:   Diagnosis Date    ADHD (attention deficit hyperactivity disorder)     Anxiety     Depressive disorder         PAST SURGICAL HISTORY:   Past Surgical History:   Procedure Laterality Date    AS RAD RESEC TONSIL/PILLARS Bilateral 2015     SECTION N/A 2022    Procedure:  SECTION;  Surgeon: eBcki Nieves MD;  Location: [facility]    DILATION AND CURETTAGE N/A 2019    Procedure: DILATION AND CURETTAGE, UTERUS, USING SUCTION;  Surgeon: Becki Nieves MD;  Location: [facility];  Service: Obstetrics        SOCIAL HISTORY   Reviewed, patient denies smoking, alcohol, and drug use  She is  single. Father is  involved    MEDICATIONS  Current Facility-Administered Medications   Medication Dose Route Frequency Provider Last Rate Last Admin    BUPivacaine liposome  "(EXPAREL) 1.3 % LA inj susp 20 mL  20 mL Infiltration DURING SURGERY Prem Barnes MD        BUPivacaine liposome (EXPAREL) LA inj was given in the infiltration site to produce post-op analgesia. Duration of action is up to 72 hours. Other \"leigh\" meds should not be given for 96 hours except for lidocaine 4% patch. This is for INFORMATION ONLY.   Does not apply Continuous PRN Prem Barnes MD        carboprost (HEMABATE) injection 250 mcg  250 mcg Intramuscular Q15 Min PRN Olivia Brown APRN CNP        And    loperamide (IMODIUM) capsule 4 mg  4 mg Oral Once PRN Olivia Brown APRN CNP        ceFAZolin Sodium (ANCEF) injection 2 g  2 g Intravenous Pre-Op/Pre-procedure x 1 dose Olivia Brown APRN CNP        ceFAZolin Sodium (ANCEF) injection 2 g  2 g Intravenous See Admin Instructions Olivia Brown APRN CNP        glucose gel 15-30 g  15-30 g Oral Q15 Min PRN Ruddy Maxwell MD        Or    dextrose 50 % injection 25-50 mL  25-50 mL Intravenous Q15 Min PRN Ruddy Maxwell MD        Or    glucagon injection 1 mg  1 mg Subcutaneous Q15 Min PRN Ruddy Maxwell MD        flumazenil (ROMAZICON) injection 0.2 mg  0.2 mg Intravenous q1 min prn Prem Barnes MD        lactated ringers BOLUS 500 mL  500 mL Intravenous Once Olivia Brown APRN CNP 1,000 mL/hr at 01/16/25 1230 Rate Change at 01/16/25 1230    lactated ringers infusion   Intravenous Continuous Olivia Brown APRN CNP        lactated ringers infusion   Intravenous Continuous Prem Barnes  mL/hr at 01/16/25 1144 New Bag at 01/16/25 1144    lidocaine (LMX4) cream   Topical Q1H PRN Olivia Brown APRN CNP        lidocaine (LMX4) cream   Topical Q1H PRN Prem Barnes MD        lidocaine 1 % 0.1-1 mL  0.1-1 mL Other Q1H PRN Olivia Brown APRN CNP        lidocaine 1 % 0.1-1 mL  0.1-1 mL Other Q1H PRN Prem Barnes MD        loperamide (IMODIUM) capsule 2 mg  2 mg Oral Q2H " "PRN Olivia Brown APRN CNP        methylergonovine (METHERGINE) injection 200 mcg  200 mcg Intramuscular Q2H PRN Olivia Brown APRN CNP        misoprostol (CYTOTEC) tablet 400 mcg  400 mcg Oral ONCE PRN REPEAT PER INSTRUCTIONS Olivia Brown APRN CNP        Or    misoprostol (CYTOTEC) tablet 800 mcg  800 mcg Rectal ONCE PRN REPEAT PER INSTRUCTIONS Olivia Brown APRN CNP        morphine (PF) (DURAMORPH) injection 150 mcg  150 mcg Intrathecal Once Prem Barnes MD        naloxone (NARCAN) injection 0.1 mg  0.1 mg Intravenous Q2 Min PRN Prem Barnes MD        oxytocin (PITOCIN) 30 units in 500 mL 0.9% NaCl infusion  100-340 mL/hr Intravenous Continuous PRN Olivia Brown APRN CNP        oxytocin (PITOCIN) 30 units in 500 mL 0.9% NaCl infusion  340 mL/hr Intravenous Continuous PRN Olivia Brown APRN CNP        oxytocin (PITOCIN) injection 10 Units  10 Units Intramuscular Once PRN Olivia Brown APRN CNP        oxytocin (PITOCIN) injection 10 Units  10 Units Intramuscular Once PRN Olivia Brown APRN CNP        sodium chloride (PF) 0.9% PF flush 3 mL  3 mL Intracatheter Q8H Olivia Brown APRN CNP   3 mL at 01/16/25 1231    sodium chloride (PF) 0.9% PF flush 3 mL  3 mL Intracatheter q1 min prn Olivia Brown APRN CNP        sodium chloride (PF) 0.9% PF flush 3 mL  3 mL Intracatheter Q8H Prem Barnes MD        sodium chloride (PF) 0.9% PF flush 3 mL  3 mL Intracatheter q1 min prn Prem Barnes MD        tranexamic acid 1 g in 100 mL NS IV bag (premix)  1 g Intravenous Q30 Min PRN Olivia Brown APRN CNP           ALLERGIES  Allergies   Allergen Reactions    Ondansetron Anxiety, Other (See Comments) and Dizziness     Jittery, claustrophobic, feels bugs crawling on skin       ROS: otherwise negative except what is stated in HPI.     PHYSICAL EXAM   BP (!) 148/98   Temp 99.3  F (37.4  C) (Oral)   Ht 1.702 m (5' 7\")   Wt 93 kg (205 lb)   LMP " 2024   SpO2 100%   BMI 32.11 kg/m     Gen: no acute distress, resting comfortably   CV: acyanotic   Heart: regular rate and rhythm   Pulm: unlabored respirations, clear to ausculation bilaterally    Abd: gravid, soft, nontender   Extremities: soft, nontender   FHR: positive, category 1  Bressler: no contractions      LABS  @LABRSLTOB(ABORH EXT,LN-ABORH,HML ABO/RH,HGB EXT,HGB,RUBELLA EXT,LN-RUBELLA IGG ANTIBODY:Last:1)@     IMPRESSION:   29 year old  at 34w0d   single inter uterine pregnancy at term   Pregnancy complications include: IUGR at 34 weeks, CHN, previous CS   section secondary to repeat    PLAN:   - Admit to hospital  - Type and screen/hgb  - NPO   - Proceed with  section   - anesthesia notified       RISK - C SECTION  Patient counseled on risks, benefits, alternatives and expectations of  section.  Risks detailed to include, but not be limited to:  Pain, bleeding, infection, anesthesia complications, possible injury to bowel, bladder, baby and/or adjacent tissues, possible need for blood transfusion (with 1/50,000 risk of bloodborne pathogen [HIV and/or Hepatitis B/C] transmission) or even hysterectomy.  Patient voiced understanding of all R/B/A/E and has agreed to proceed with  section for delivery if needed or recommended.      @ME2@

## 2025-01-16 NOTE — PLAN OF CARE
Problem:  Delivery  Goal: Bleeding is Controlled  Outcome: Adequate for Care Transition  Goal: Stable Fetal Wellbeing  Outcome: Adequate for Care Transition  Goal: Absence of Infection Signs and Symptoms  Outcome: Adequate for Care Transition  Goal: Effective Oxygenation and Ventilation  Outcome: Adequate for Care Transition   Goal Outcome Evaluation:      Plan of Care Reviewed With: patient, significant other, family    Overall Patient Progress: improvingOverall Patient Progress: improving    Outcome Evaluation: scheduled repeat  at 1333 for GHTN/FGR per M recommendation. patient treated for low blood glucose prior to surgery. QBL of 836ml. High b/p readings noted, MD Maxwell aware. arrival hgb 10.6. patient arrived to room for recovery at 1445. called [facility] for PACU orders for pain meds, awaiting pharmacy to verify, patient having moderate pain. declined breast pumping, plans on doing DBM and formula for baby. mother updated on baby's status in NICU, NNP will come see mother soon, anxiety noted with mother, reassurance effective. Uterus 2 below, firm uterus with scant bleeding, small clots noted. bedside report given to Saran ROSARIO RN cares relinquished at this time. supportive and attentive mother and significant other at bedside.

## 2025-01-16 NOTE — PROGRESS NOTES
Pain not controlled well yet  /79 and pulse 94  Pitocin running.  Abd-tender at incision and fundus. Fundus firm and below the umbliicus.   Bimanual showed no significant bleeding.  with surgery and another 150 since.   Will give TXA and cytotec and do labs.

## 2025-01-16 NOTE — ANESTHESIA PREPROCEDURE EVALUATION
Anesthesia Pre-Procedure Evaluation    Patient: Shital Tamayo   MRN: [id number] : 1995        Procedure : Procedure(s):  REPEAT  SECTION          Past Medical History:   Diagnosis Date    ADHD (attention deficit hyperactivity disorder)     Anxiety     Depressive disorder       Past Surgical History:   Procedure Laterality Date    AS RAD RESEC TONSIL/PILLARS Bilateral 2015     SECTION N/A 2022    Procedure:  SECTION;  Surgeon: Becki Nieves MD;  Location: [facility]    DILATION AND CURETTAGE N/A 2019    Procedure: DILATION AND CURETTAGE, UTERUS, USING SUCTION;  Surgeon: Becki Nieves MD;  Location: [facility];  Service: Obstetrics      Allergies   Allergen Reactions    Ondansetron Anxiety, Other (See Comments) and Dizziness     Jittery, claustrophobic, feels bugs crawling on skin      Social History     Tobacco Use    Smoking status: Never     Passive exposure: Never    Smokeless tobacco: Never   Substance Use Topics    Alcohol use: No     Comment: Alcoholic Drinks/day: occasional use      Wt Readings from Last 1 Encounters:   25 93 kg (205 lb)        Anesthesia Evaluation            ROS/MED HX  ENT/Pulmonary:  - neg pulmonary ROS     Neurologic:  - neg neurologic ROS     Cardiovascular:  - neg cardiovascular ROS     METS/Exercise Tolerance:     Hematologic:  - neg hematologic  ROS   (+)      anemia,          Musculoskeletal:  - neg musculoskeletal ROS     GI/Hepatic:  - neg GI/hepatic ROS     Renal/Genitourinary:  - neg Renal ROS     Endo:  - neg endo ROS     Psychiatric/Substance Use:  - neg psychiatric ROS     Infectious Disease:  - neg infectious disease ROS     Malignancy:       Other:      (+) Possibly pregnant, , ,         Physical Exam    Airway        Mallampati: II       Respiratory Devices and Support         Dental       (+) Completely normal teeth      Cardiovascular   cardiovascular exam normal          Pulmonary    pulmonary exam normal                OUTSIDE LABS:  CBC:   Lab Results   Component Value Date    WBC 7.6 01/16/2025    WBC 8.7 01/07/2025    HGB 10.6 (L) 01/16/2025    HGB 10.9 (L) 01/07/2025    HCT 31.0 (L) 01/16/2025    HCT 32.2 (L) 01/07/2025     01/16/2025     01/07/2025     BMP:   Lab Results   Component Value Date     01/07/2025     (L) 01/06/2025    POTASSIUM 3.4 01/07/2025    POTASSIUM 3.7 01/06/2025    CHLORIDE 106 01/07/2025    CHLORIDE 103 01/06/2025    CO2 22 01/07/2025    CO2 22 01/06/2025    BUN 5.3 (L) 01/07/2025    BUN 5.2 (L) 01/06/2025    CR 0.61 01/07/2025    CR 0.55 01/06/2025     (H) 01/07/2025    GLC 73 01/06/2025     COAGS:   Lab Results   Component Value Date    PTT 29 12/07/2024    INR 1.01 12/07/2024    FIBR 359 12/07/2024     POC:   Lab Results   Component Value Date    HCG Negative 09/23/2014     HEPATIC:   Lab Results   Component Value Date    ALBUMIN 3.6 01/07/2025    PROTTOTAL 6.3 (L) 01/07/2025    ALT 13 01/07/2025    AST 19 01/07/2025    ALKPHOS 117 01/07/2025    BILITOTAL 0.2 01/07/2025     OTHER:   Lab Results   Component Value Date    OSWALDO 9.0 01/07/2025    MAG 1.7 12/07/2024    TSH 0.50 08/03/2024       Anesthesia Plan    ASA Status:  2    NPO Status:  NPO Appropriate    Anesthesia Type: Spinal.              Consents    Anesthesia Plan(s) and associated risks, benefits, and realistic alternatives discussed. Questions answered and patient/representative(s) expressed understanding.     - Discussed: Risks, Benefits and Alternatives for BOTH SEDATION and the PROCEDURE were discussed     - Discussed with:  Patient            Postoperative Care            Comments:               Prem Barnes MD    I have reviewed the pertinent notes and labs in the chart from the past 30 days and (re)examined the patient.  Any updates or changes from those notes are reflected in this note.               # Hypertension: Noted on problem list      # Anemia: based on  hgb <11

## 2025-01-16 NOTE — PROGRESS NOTES
Called Alissa updated on two b/p readings of >140s, continue to monitor. Hx of opium usage 2022, neg utox in August reviewed with MD Maxwell, ok to do another UTOX. Saray RAMOS updated, will do drug screening on infant in NICU.    Lance Menard RN

## 2025-01-17 ENCOUNTER — APPOINTMENT (OUTPATIENT)
Dept: RADIOLOGY | Facility: HOSPITAL | Age: 30
End: 2025-01-17
Attending: OBSTETRICS & GYNECOLOGY
Payer: COMMERCIAL

## 2025-01-17 ENCOUNTER — APPOINTMENT (OUTPATIENT)
Dept: CT IMAGING | Facility: HOSPITAL | Age: 30
End: 2025-01-17
Attending: HOSPITALIST
Payer: COMMERCIAL

## 2025-01-17 LAB
ALT SERPL W P-5'-P-CCNC: 8 U/L (ref 0–50)
ANION GAP SERPL CALCULATED.3IONS-SCNC: 10 MMOL/L (ref 7–15)
ANION GAP SERPL CALCULATED.3IONS-SCNC: 8 MMOL/L (ref 7–15)
ANION GAP SERPL CALCULATED.3IONS-SCNC: 9 MMOL/L (ref 7–15)
APTT PPP: 25 SECONDS (ref 22–38)
APTT PPP: 26 SECONDS (ref 22–38)
APTT PPP: 26 SECONDS (ref 22–38)
AST SERPL W P-5'-P-CCNC: 21 U/L (ref 0–45)
ATRIAL RATE - MUSE: 110 BPM
BASOPHILS # BLD AUTO: 0 10E3/UL (ref 0–0.2)
BASOPHILS NFR BLD AUTO: 0 %
BLD PROD TYP BPU: NORMAL
BLOOD COMPONENT TYPE: NORMAL
BUN SERPL-MCNC: 10.3 MG/DL (ref 6–20)
BUN SERPL-MCNC: 12.4 MG/DL (ref 6–20)
BUN SERPL-MCNC: 12.7 MG/DL (ref 6–20)
CA-I BLD-MCNC: 4.5 MG/DL (ref 4.4–5.2)
CALCIUM SERPL-MCNC: 7.7 MG/DL (ref 8.8–10.4)
CALCIUM SERPL-MCNC: 7.8 MG/DL (ref 8.8–10.4)
CALCIUM SERPL-MCNC: 8.2 MG/DL (ref 8.8–10.4)
CHLORIDE SERPL-SCNC: 105 MMOL/L (ref 98–107)
CHLORIDE SERPL-SCNC: 105 MMOL/L (ref 98–107)
CHLORIDE SERPL-SCNC: 107 MMOL/L (ref 98–107)
CODING SYSTEM: NORMAL
CREAT SERPL-MCNC: 0.79 MG/DL (ref 0.51–0.95)
CREAT SERPL-MCNC: 0.83 MG/DL (ref 0.51–0.95)
CROSSMATCH: NORMAL
D DIMER PPP FEU-MCNC: 14.27 UG/ML FEU (ref 0–0.5)
D DIMER PPP FEU-MCNC: 6.46 UG/ML FEU (ref 0–0.5)
D DIMER PPP FEU-MCNC: >20 UG/ML FEU (ref 0–0.5)
DIASTOLIC BLOOD PRESSURE - MUSE: NORMAL MMHG
EGFRCR SERPLBLD CKD-EPI 2021: >90 ML/MIN/1.73M2
EOSINOPHIL # BLD AUTO: 0 10E3/UL (ref 0–0.7)
EOSINOPHIL NFR BLD AUTO: 0 %
ERYTHROCYTE [DISTWIDTH] IN BLOOD BY AUTOMATED COUNT: 13.9 % (ref 10–15)
ERYTHROCYTE [DISTWIDTH] IN BLOOD BY AUTOMATED COUNT: 14.1 % (ref 10–15)
ERYTHROCYTE [DISTWIDTH] IN BLOOD BY AUTOMATED COUNT: 15.1 % (ref 10–15)
ERYTHROCYTE [DISTWIDTH] IN BLOOD BY AUTOMATED COUNT: 15.5 % (ref 10–15)
FIBRINOGEN PPP-MCNC: 192 MG/DL (ref 170–510)
FIBRINOGEN PPP-MCNC: 219 MG/DL (ref 170–510)
FIBRINOGEN PPP-MCNC: 290 MG/DL (ref 170–510)
GLUCOSE SERPL-MCNC: 127 MG/DL (ref 70–99)
GLUCOSE SERPL-MCNC: 128 MG/DL (ref 70–99)
GLUCOSE SERPL-MCNC: 167 MG/DL (ref 70–99)
HCO3 SERPL-SCNC: 19 MMOL/L (ref 22–29)
HCO3 SERPL-SCNC: 20 MMOL/L (ref 22–29)
HCO3 SERPL-SCNC: 20 MMOL/L (ref 22–29)
HCT VFR BLD AUTO: 18.3 % (ref 35–47)
HCT VFR BLD AUTO: 19.1 % (ref 35–47)
HCT VFR BLD AUTO: 19.6 % (ref 35–47)
HCT VFR BLD AUTO: 26.4 % (ref 35–47)
HGB BLD-MCNC: 6.1 G/DL (ref 11.7–15.7)
HGB BLD-MCNC: 6.6 G/DL (ref 11.7–15.7)
HGB BLD-MCNC: 6.7 G/DL (ref 11.7–15.7)
HGB BLD-MCNC: 8 G/DL (ref 11.7–15.7)
HGB BLD-MCNC: 8.7 G/DL (ref 11.7–15.7)
HOLD SPECIMEN: NORMAL
HOLD SPECIMEN: NORMAL
IMM GRANULOCYTES # BLD: 0.1 10E3/UL
IMM GRANULOCYTES NFR BLD: 1 %
INR PPP: 1.01 (ref 0.85–1.15)
INR PPP: 1.11 (ref 0.85–1.15)
INR PPP: 1.24 (ref 0.85–1.15)
INTERPRETATION ECG - MUSE: NORMAL
ISSUE DATE AND TIME: NORMAL
LACTATE SERPL-SCNC: 1.1 MMOL/L (ref 0.7–2)
LYMPHOCYTES # BLD AUTO: 2.1 10E3/UL (ref 0.8–5.3)
LYMPHOCYTES NFR BLD AUTO: 18 %
MCH RBC QN AUTO: 28.2 PG (ref 26.5–33)
MCH RBC QN AUTO: 28.4 PG (ref 26.5–33)
MCH RBC QN AUTO: 28.4 PG (ref 26.5–33)
MCH RBC QN AUTO: 28.8 PG (ref 26.5–33)
MCHC RBC AUTO-ENTMCNC: 33 G/DL (ref 31.5–36.5)
MCHC RBC AUTO-ENTMCNC: 33.3 G/DL (ref 31.5–36.5)
MCHC RBC AUTO-ENTMCNC: 33.7 G/DL (ref 31.5–36.5)
MCHC RBC AUTO-ENTMCNC: 35.1 G/DL (ref 31.5–36.5)
MCV RBC AUTO: 82 FL (ref 78–100)
MCV RBC AUTO: 85 FL (ref 78–100)
MONOCYTES # BLD AUTO: 1.4 10E3/UL (ref 0–1.3)
MONOCYTES NFR BLD AUTO: 12 %
NEUTROPHILS # BLD AUTO: 7.8 10E3/UL (ref 1.6–8.3)
NEUTROPHILS NFR BLD AUTO: 68 %
NRBC # BLD AUTO: 0 10E3/UL
NRBC BLD AUTO-RTO: 0 /100
NT-PROBNP SERPL-MCNC: 40 PG/ML (ref 0–450)
P AXIS - MUSE: 40 DEGREES
PLATELET # BLD AUTO: 116 10E3/UL (ref 150–450)
PLATELET # BLD AUTO: 141 10E3/UL (ref 150–450)
PLATELET # BLD AUTO: 151 10E3/UL (ref 150–450)
PLATELET # BLD AUTO: 161 10E3/UL (ref 150–450)
POTASSIUM SERPL-SCNC: 3.7 MMOL/L (ref 3.4–5.3)
POTASSIUM SERPL-SCNC: 3.7 MMOL/L (ref 3.4–5.3)
POTASSIUM SERPL-SCNC: 4.2 MMOL/L (ref 3.4–5.3)
PR INTERVAL - MUSE: 126 MS
PROCALCITONIN SERPL IA-MCNC: 0.18 NG/ML
QRS DURATION - MUSE: 70 MS
QT - MUSE: 356 MS
QTC - MUSE: 481 MS
R AXIS - MUSE: 48 DEGREES
RBC # BLD AUTO: 2.15 10E6/UL (ref 3.8–5.2)
RBC # BLD AUTO: 2.32 10E6/UL (ref 3.8–5.2)
RBC # BLD AUTO: 2.33 10E6/UL (ref 3.8–5.2)
RBC # BLD AUTO: 3.09 10E6/UL (ref 3.8–5.2)
SODIUM SERPL-SCNC: 133 MMOL/L (ref 135–145)
SODIUM SERPL-SCNC: 133 MMOL/L (ref 135–145)
SODIUM SERPL-SCNC: 137 MMOL/L (ref 135–145)
SYSTOLIC BLOOD PRESSURE - MUSE: NORMAL MMHG
T AXIS - MUSE: 29 DEGREES
TROPONIN T SERPL HS-MCNC: <6 NG/L
UNIT ABO/RH: NORMAL
UNIT NUMBER: NORMAL
UNIT STATUS: NORMAL
UNIT TYPE ISBT: 6200
VENTRICULAR RATE- MUSE: 110 BPM
WBC # BLD AUTO: 11.5 10E3/UL (ref 4–11)
WBC # BLD AUTO: 14.6 10E3/UL (ref 4–11)
WBC # BLD AUTO: 14.8 10E3/UL (ref 4–11)
WBC # BLD AUTO: 15 10E3/UL (ref 4–11)

## 2025-01-17 PROCEDURE — P9016 RBC LEUKOCYTES REDUCED: HCPCS | Performed by: INTERNAL MEDICINE

## 2025-01-17 PROCEDURE — 85610 PROTHROMBIN TIME: CPT | Performed by: OBSTETRICS & GYNECOLOGY

## 2025-01-17 PROCEDURE — 85384 FIBRINOGEN ACTIVITY: CPT | Performed by: OBSTETRICS & GYNECOLOGY

## 2025-01-17 PROCEDURE — 250N000011 HC RX IP 250 OP 636: Performed by: HOSPITALIST

## 2025-01-17 PROCEDURE — 250N000013 HC RX MED GY IP 250 OP 250 PS 637: Performed by: OBSTETRICS & GYNECOLOGY

## 2025-01-17 PROCEDURE — 83880 ASSAY OF NATRIURETIC PEPTIDE: CPT | Performed by: HOSPITALIST

## 2025-01-17 PROCEDURE — 80048 BASIC METABOLIC PNL TOTAL CA: CPT | Performed by: HOSPITALIST

## 2025-01-17 PROCEDURE — 84145 PROCALCITONIN (PCT): CPT | Performed by: HOSPITALIST

## 2025-01-17 PROCEDURE — 85379 FIBRIN DEGRADATION QUANT: CPT | Performed by: OBSTETRICS & GYNECOLOGY

## 2025-01-17 PROCEDURE — 250N000011 HC RX IP 250 OP 636: Performed by: OBSTETRICS & GYNECOLOGY

## 2025-01-17 PROCEDURE — 120N000004 HC R&B MS OVERFLOW

## 2025-01-17 PROCEDURE — 85018 HEMOGLOBIN: CPT | Performed by: HOSPITALIST

## 2025-01-17 PROCEDURE — 84484 ASSAY OF TROPONIN QUANT: CPT | Performed by: HOSPITALIST

## 2025-01-17 PROCEDURE — 85018 HEMOGLOBIN: CPT | Performed by: OBSTETRICS & GYNECOLOGY

## 2025-01-17 PROCEDURE — 71045 X-RAY EXAM CHEST 1 VIEW: CPT

## 2025-01-17 PROCEDURE — 83605 ASSAY OF LACTIC ACID: CPT | Performed by: HOSPITALIST

## 2025-01-17 PROCEDURE — 80048 BASIC METABOLIC PNL TOTAL CA: CPT | Performed by: STUDENT IN AN ORGANIZED HEALTH CARE EDUCATION/TRAINING PROGRAM

## 2025-01-17 PROCEDURE — 84450 TRANSFERASE (AST) (SGOT): CPT | Performed by: OBSTETRICS & GYNECOLOGY

## 2025-01-17 PROCEDURE — 93005 ELECTROCARDIOGRAM TRACING: CPT

## 2025-01-17 PROCEDURE — 99418 PROLNG IP/OBS E/M EA 15 MIN: CPT | Performed by: HOSPITALIST

## 2025-01-17 PROCEDURE — 85730 THROMBOPLASTIN TIME PARTIAL: CPT | Performed by: OBSTETRICS & GYNECOLOGY

## 2025-01-17 PROCEDURE — 36415 COLL VENOUS BLD VENIPUNCTURE: CPT | Performed by: HOSPITALIST

## 2025-01-17 PROCEDURE — P9016 RBC LEUKOCYTES REDUCED: HCPCS | Performed by: STUDENT IN AN ORGANIZED HEALTH CARE EDUCATION/TRAINING PROGRAM

## 2025-01-17 PROCEDURE — 82330 ASSAY OF CALCIUM: CPT | Performed by: STUDENT IN AN ORGANIZED HEALTH CARE EDUCATION/TRAINING PROGRAM

## 2025-01-17 PROCEDURE — 85025 COMPLETE CBC W/AUTO DIFF WBC: CPT | Performed by: OBSTETRICS & GYNECOLOGY

## 2025-01-17 PROCEDURE — 82565 ASSAY OF CREATININE: CPT | Performed by: OBSTETRICS & GYNECOLOGY

## 2025-01-17 PROCEDURE — 71275 CT ANGIOGRAPHY CHEST: CPT

## 2025-01-17 PROCEDURE — 36415 COLL VENOUS BLD VENIPUNCTURE: CPT | Performed by: OBSTETRICS & GYNECOLOGY

## 2025-01-17 PROCEDURE — 85049 AUTOMATED PLATELET COUNT: CPT | Performed by: OBSTETRICS & GYNECOLOGY

## 2025-01-17 PROCEDURE — 84520 ASSAY OF UREA NITROGEN: CPT | Performed by: STUDENT IN AN ORGANIZED HEALTH CARE EDUCATION/TRAINING PROGRAM

## 2025-01-17 PROCEDURE — 93010 ELECTROCARDIOGRAM REPORT: CPT | Performed by: STUDENT IN AN ORGANIZED HEALTH CARE EDUCATION/TRAINING PROGRAM

## 2025-01-17 PROCEDURE — 84520 ASSAY OF UREA NITROGEN: CPT | Performed by: HOSPITALIST

## 2025-01-17 PROCEDURE — 250N000009 HC RX 250: Performed by: OBSTETRICS & GYNECOLOGY

## 2025-01-17 PROCEDURE — 99255 IP/OBS CONSLTJ NEW/EST HI 80: CPT | Performed by: HOSPITALIST

## 2025-01-17 PROCEDURE — 36415 COLL VENOUS BLD VENIPUNCTURE: CPT | Performed by: STUDENT IN AN ORGANIZED HEALTH CARE EDUCATION/TRAINING PROGRAM

## 2025-01-17 PROCEDURE — 84460 ALANINE AMINO (ALT) (SGPT): CPT | Performed by: OBSTETRICS & GYNECOLOGY

## 2025-01-17 RX ORDER — OXYCODONE HYDROCHLORIDE 5 MG/1
5 TABLET ORAL ONCE
Status: DISCONTINUED | OUTPATIENT
Start: 2025-01-17 | End: 2025-01-17

## 2025-01-17 RX ORDER — FUROSEMIDE 10 MG/ML
10 INJECTION INTRAMUSCULAR; INTRAVENOUS ONCE
Status: COMPLETED | OUTPATIENT
Start: 2025-01-17 | End: 2025-01-17

## 2025-01-17 RX ORDER — OXYCODONE HYDROCHLORIDE 5 MG/1
5-10 TABLET ORAL EVERY 4 HOURS PRN
Status: DISCONTINUED | OUTPATIENT
Start: 2025-01-17 | End: 2025-01-18

## 2025-01-17 RX ORDER — KETOROLAC TROMETHAMINE 30 MG/ML
30 INJECTION, SOLUTION INTRAMUSCULAR; INTRAVENOUS EVERY 6 HOURS PRN
Status: DISCONTINUED | OUTPATIENT
Start: 2025-01-17 | End: 2025-01-20

## 2025-01-17 RX ORDER — NALOXONE HYDROCHLORIDE 0.4 MG/ML
0.4 INJECTION, SOLUTION INTRAMUSCULAR; INTRAVENOUS; SUBCUTANEOUS
Status: DISCONTINUED | OUTPATIENT
Start: 2025-01-17 | End: 2025-01-20 | Stop reason: HOSPADM

## 2025-01-17 RX ORDER — CYCLOBENZAPRINE HCL 5 MG
5 TABLET ORAL EVERY 8 HOURS PRN
Status: DISCONTINUED | OUTPATIENT
Start: 2025-01-17 | End: 2025-01-17

## 2025-01-17 RX ORDER — NALOXONE HYDROCHLORIDE 0.4 MG/ML
0.2 INJECTION, SOLUTION INTRAMUSCULAR; INTRAVENOUS; SUBCUTANEOUS
Status: DISCONTINUED | OUTPATIENT
Start: 2025-01-17 | End: 2025-01-20 | Stop reason: HOSPADM

## 2025-01-17 RX ORDER — CYCLOBENZAPRINE HCL 5 MG
5 TABLET ORAL ONCE
Status: DISCONTINUED | OUTPATIENT
Start: 2025-01-17 | End: 2025-01-17

## 2025-01-17 RX ORDER — CYCLOBENZAPRINE HCL 10 MG
10 TABLET ORAL EVERY 8 HOURS PRN
Status: DISCONTINUED | OUTPATIENT
Start: 2025-01-17 | End: 2025-01-20 | Stop reason: HOSPADM

## 2025-01-17 RX ORDER — SIMETHICONE 80 MG
80 TABLET,CHEWABLE ORAL EVERY 6 HOURS PRN
Status: DISCONTINUED | OUTPATIENT
Start: 2025-01-17 | End: 2025-01-20 | Stop reason: HOSPADM

## 2025-01-17 RX ORDER — NALOXONE HYDROCHLORIDE 0.4 MG/ML
0.4 INJECTION, SOLUTION INTRAMUSCULAR; INTRAVENOUS; SUBCUTANEOUS ONCE
Status: COMPLETED | OUTPATIENT
Start: 2025-01-17 | End: 2025-01-17

## 2025-01-17 RX ORDER — IOPAMIDOL 755 MG/ML
90 INJECTION, SOLUTION INTRAVASCULAR ONCE
Status: COMPLETED | OUTPATIENT
Start: 2025-01-17 | End: 2025-01-17

## 2025-01-17 RX ADMIN — CYCLOBENZAPRINE 10 MG: 10 TABLET, FILM COATED ORAL at 11:47

## 2025-01-17 RX ADMIN — ACETAMINOPHEN 975 MG: 325 TABLET ORAL at 11:47

## 2025-01-17 RX ADMIN — ACETAMINOPHEN 975 MG: 325 TABLET ORAL at 05:30

## 2025-01-17 RX ADMIN — SIMETHICONE 80 MG: 80 TABLET, CHEWABLE ORAL at 01:12

## 2025-01-17 RX ADMIN — EPSOM SALT: 1 GRANULE ORAL; TOPICAL at 07:53

## 2025-01-17 RX ADMIN — HYDROMORPHONE HYDROCHLORIDE 1 MG: 1 INJECTION, SOLUTION INTRAMUSCULAR; INTRAVENOUS; SUBCUTANEOUS at 03:00

## 2025-01-17 RX ADMIN — IOPAMIDOL 90 ML: 755 INJECTION, SOLUTION INTRAVENOUS at 19:09

## 2025-01-17 RX ADMIN — HYDROXYZINE HYDROCHLORIDE 100 MG: 50 TABLET ORAL at 13:57

## 2025-01-17 RX ADMIN — OXYCODONE HYDROCHLORIDE 5 MG: 5 TABLET ORAL at 07:52

## 2025-01-17 RX ADMIN — NALOXONE HYDROCHLORIDE 0.2 MG: 0.4 INJECTION, SOLUTION INTRAMUSCULAR; INTRAVENOUS; SUBCUTANEOUS at 20:30

## 2025-01-17 RX ADMIN — NIFEDIPINE 30 MG: 30 TABLET, EXTENDED RELEASE ORAL at 06:07

## 2025-01-17 RX ADMIN — HYDROMORPHONE HYDROCHLORIDE 1 MG: 1 INJECTION, SOLUTION INTRAMUSCULAR; INTRAVENOUS; SUBCUTANEOUS at 09:23

## 2025-01-17 RX ADMIN — KETOROLAC TROMETHAMINE 30 MG: 30 INJECTION, SOLUTION INTRAMUSCULAR at 21:32

## 2025-01-17 RX ADMIN — SIMETHICONE 80 MG: 80 TABLET, CHEWABLE ORAL at 07:52

## 2025-01-17 RX ADMIN — NALOXONE HYDROCHLORIDE 0.4 MG: 0.4 INJECTION, SOLUTION INTRAMUSCULAR; INTRAVENOUS; SUBCUTANEOUS at 18:39

## 2025-01-17 RX ADMIN — HYDROMORPHONE HYDROCHLORIDE 1 MG: 1 INJECTION, SOLUTION INTRAMUSCULAR; INTRAVENOUS; SUBCUTANEOUS at 05:23

## 2025-01-17 RX ADMIN — ESCITALOPRAM OXALATE 20 MG: 20 TABLET ORAL at 08:28

## 2025-01-17 RX ADMIN — HYDROMORPHONE HYDROCHLORIDE 1 MG: 1 INJECTION, SOLUTION INTRAMUSCULAR; INTRAVENOUS; SUBCUTANEOUS at 13:57

## 2025-01-17 RX ADMIN — KETOROLAC TROMETHAMINE 30 MG: 30 INJECTION, SOLUTION INTRAMUSCULAR at 05:29

## 2025-01-17 RX ADMIN — CYCLOBENZAPRINE HYDROCHLORIDE 5 MG: 5 TABLET, FILM COATED ORAL at 05:24

## 2025-01-17 RX ADMIN — FUROSEMIDE 10 MG: 10 INJECTION, SOLUTION INTRAMUSCULAR; INTRAVENOUS at 21:27

## 2025-01-17 RX ADMIN — NALOXONE HYDROCHLORIDE 0.2 MG: 0.4 INJECTION, SOLUTION INTRAMUSCULAR; INTRAVENOUS; SUBCUTANEOUS at 21:37

## 2025-01-17 RX ADMIN — NALOXONE HYDROCHLORIDE 0.2 MG: 0.4 INJECTION, SOLUTION INTRAMUSCULAR; INTRAVENOUS; SUBCUTANEOUS at 21:24

## 2025-01-17 RX ADMIN — SENNOSIDES AND DOCUSATE SODIUM 1 TABLET: 50; 8.6 TABLET ORAL at 08:28

## 2025-01-17 RX ADMIN — HYDROXYZINE HYDROCHLORIDE 100 MG: 50 TABLET ORAL at 07:52

## 2025-01-17 ASSESSMENT — ACTIVITIES OF DAILY LIVING (ADL)
ADLS_ACUITY_SCORE: 38
ADLS_ACUITY_SCORE: 38
ADLS_ACUITY_SCORE: 33
ADLS_ACUITY_SCORE: 38
ADLS_ACUITY_SCORE: 33
ADLS_ACUITY_SCORE: 33
ADLS_ACUITY_SCORE: 38
ADLS_ACUITY_SCORE: 33
ADLS_ACUITY_SCORE: 38
ADLS_ACUITY_SCORE: 33
ADLS_ACUITY_SCORE: 33
ADLS_ACUITY_SCORE: 38
ADLS_ACUITY_SCORE: 33
ADLS_ACUITY_SCORE: 38
ADLS_ACUITY_SCORE: 33
ADLS_ACUITY_SCORE: 38
ADLS_ACUITY_SCORE: 38
ADLS_ACUITY_SCORE: 33
ADLS_ACUITY_SCORE: 38
ADLS_ACUITY_SCORE: 33
ADLS_ACUITY_SCORE: 37

## 2025-01-17 NOTE — PROVIDER NOTIFICATION
01/16/25 2008   Critical Test Results/Notification   Critical Lab Result (Lab Name and Value) Fibrinogen and D Dimer   What Time Did The Lab Notify You? 2008     Lab reported preliminary results for critical lab for Fibrinogen 78 and D dimer more than 20. Lab states that they will re-run labs per protocol. Primary RN notified and aware.

## 2025-01-17 NOTE — PROCEDURES
Op note  Preop: Obstetric hemorrhage after delivery not responding to medications  Postop: Same and likely due to atony  Procedure: Exam under anesthesia, suction curettage, placement of KENNY  Surgeon: Less  Anesthesia: General  Estimated blood loss: 20 cc during the procedure  Procedure: After appropriate consents were obtained and signed patient was brought the operating room and given general anesthesia.  She was placed in the dorsolithotomy position and prepped draped usual sterile manner.  A bivalve speculum was placed in the vagina and only a small (20 cc) clot was removed from the cervix.  The cervix appeared to be at least 2 cm dilated and no significant bleeding was coming from it even with fundal pressure.  I grasped the anterior lip of the cervix with a ring tenaculum and then placed a #10 suction curette and used suction to remove a small amount of clot.  I then placed the KENNY device without difficulty.  I used 120 cc in the balloon.  The device was hooked to suction and only a small amount of bleeding was noted to come from the tube.  There was no bleeding around the tube.  There were no other sources of bleeding in the vagina.  The uterus was felt to be firm and below the umbilicus.  The procedure was then terminated.  The patient was awakened and taken recovery room in good condition.

## 2025-01-17 NOTE — PHARMACY-ADMISSION MEDICATION HISTORY
Pharmacist Admission Medication History    Admission medication history is complete. The information provided in this note is only as accurate as the sources available at the time of the update.    Information Source(s): Patient, Clinic records, Hospital records, and CareEverywhere/SureScripts via in-person    Pertinent Information: none    Changes made to PTA medication list:  Added: labetalol  Deleted: None  Changed: None    Allergies reviewed with patient and updates made in EHR: yes    Medication History Completed By: Clifford Garcia RPH 1/16/2025 10:18 PM    PTA Med List   Medication Sig Last Dose/Taking    ADDERALL XR 30 MG 24 hr capsule Take 30 mg by mouth daily. 1/16/2025    aspirin-acetaminophen-caffeine (EXCEDRIN MIGRAINE) 250-250-65 MG tablet Take 1 tablet by mouth daily as needed for headaches. Unknown    escitalopram (LEXAPRO) 20 MG tablet Take 20 mg by mouth daily. 1/15/2025    hydrOXYzine (ATARAX) 50 MG tablet Take 2 tablets (100 mg) by mouth 3 times daily as needed for anxiety 1/16/2025    labetalol (NORMODYNE) 100 MG tablet Take 100 mg by mouth 2 times daily. 1/16/2025 Morning    NIFEdipine ER (ADALAT CC) 30 MG 24 hr tablet Take 1 tablet (30 mg) by mouth 2 times daily. 1/16/2025

## 2025-01-17 NOTE — PROGRESS NOTES
Patient transferred from PACU to room 8 in stable condition. Bleeding well controlled, total of 20 mL out of Margaret at this time. BP slightly elevated 130s/90s, patient denies all pre E signs/symptoms. Patient pain okay at this time  4/10. Patient's mother and FOB at bedside and supportive. RN to complete recovery cares at this time.     Charly Harden RN

## 2025-01-17 NOTE — PROGRESS NOTES
Critical lab results called to RN for Fibrinogen of 78 and D Dimer of more than 20. Previous Fibrinogen today at 1555 was 309. Less MD notified and aware of lab results. MD came to bedside to assess patient and decision made to transfer to ICU for management. MD calling intensivist to organize transfer, Charge RN aware. Patient in stable condition at this time, no excessive bleeding noted in Margaret tubing, no oozing noted around Margaret, incision site WNL, no additional oozing noted from the areas already identified, IV sites intact with no oozing present. BP mildly elevated 130s-140s over 80s-90s, tachycardia present. Due to patient being transferred to ICU, in stable condition at this time and inability to see her son since he was born and brought to NICU, writer bringing patient to NICU on the way to ICU. Less MD aware and okay with that. Patient transferred to NICU with 2 RNs present at all times.     Charly Harden RN

## 2025-01-17 NOTE — PROGRESS NOTES
Removed rozina at 0900. Fundal checks firm at umbilicus, no further bleeding. Mag pack in place. Do in place.

## 2025-01-17 NOTE — PROGRESS NOTES
"RN to ICU for Margaret and fundal assessment. Pt was resting comfortably in bed with eyes closed and RR approximately 24. Nurse went to assess fundus and before placing hands on Pt, Pt starting yelling out stating that the assessment hurt. Nurse informed Pt that she hadn't started palpating the Pt yet. She said \" oh ok.\" Pt then yelled out in pain during light palpation of top of fundus. Pt was educated that nurse needed to palpate if the fundus was firm or not, which would require deeper pressure. Pt again yelled out before nurse had hands on the Pt. This nurse was able to lightly palpate fundus as firm and noted 5mL of lochia loss in suction. Report given to assigned nurse and MD updated.  "

## 2025-01-17 NOTE — PROGRESS NOTES
Back from PACU and bleeding is minimal. Pain better controlled  /99   Hgb 7.6  Fibrinogen 78  INR 1.67  Firm fundus  Imp: DIC  Plan: Will transfer to ICU and Intensivist consult to revers the coagulopathy

## 2025-01-17 NOTE — ANESTHESIA CARE TRANSFER NOTE
Patient: Shital Tamayo    Procedure: Procedure(s):  EXAM UNDER ANESTHESIA, GYNECOLOGIC       Diagnosis: Hemorrhage [R58]  Diagnosis Additional Information: No value filed.    Anesthesia Type:   No value filed.     Note:    Oropharynx: oropharynx clear of all foreign objects and spontaneously breathing  Level of Consciousness: awake  Oxygen Supplementation: face mask  Level of Supplemental Oxygen (L/min / FiO2): 8  Independent Airway: airway patency satisfactory and stable  Dentition: dentition unchanged  Vital Signs Stable: post-procedure vital signs reviewed and stable  Report to RN Given: handoff report given  Patient transferred to: PACU    Handoff Report: Identifed the Patient, Identified the Reponsible Provider, Reviewed the pertinent medical history, Discussed the surgical course, Reviewed Intra-OP anesthesia mangement and issues during anesthesia, Set expectations for post-procedure period and Allowed opportunity for questions and acknowledgement of understanding      Vitals:  Vitals Value Taken Time   /100 01/16/25 1923   Temp 36.8  C (98.3  F) 01/16/25 1923   Pulse 93 01/16/25 1923   Resp 18 01/16/25 1923   SpO2 100 % 01/16/25 1923       Electronically Signed By: KYLE Day CRNA  January 16, 2025  7:25 PM

## 2025-01-17 NOTE — PROGRESS NOTES
Report received from Geni Fernandez and Ginny HUMPHREY  Assumed patient care at 1630.  Upon arrival into the room the patient is noted to be sleeping soundly.  The patients mother, Falguni, is at the bedside and is supportive.  Falguni states this is the first time she has slept since delivery.  Falguni stated that Shital ate half of her sandwich before falling asleep.  This RN applied a pulse ox due to patient's medications and history.  Sats noted to be 87% at room air.  A nasal cannula at 2L was applied while the patient continued to sleep.  This only increased the saturation to 89%.  A face mask was applied at 10L which brought the saturation to 92%.  Plan made with family to call me if the patient awakens and so I can give her pain medication.  Family agrees.  This RN completed a full assessment, which did not wake the patient.  Dr. Zaman called and updated on patient status, namely O2 sats at 92% on 10L facemask.  MD okay with current O2 sat.  Order received for a hospitalist consult to manage O2 sats, and complicated medical history.

## 2025-01-17 NOTE — PROGRESS NOTES
Obstetrics Post-Op Progress Note/POD 1         Assessment and Plan:    Assessment: Shital Tamayo is a 29 year old  who underwent a   repeat  section at 33+6 weeks secondary to severe IUGR and Chronic HTN.   Surgery complicated for pain control issues and PPH. Taken to the OR for KENNY placement.   ICU admission for blood loss and volume resuscitation.      L&D complications: C/S at 33 weeks  Severe IUGR  Chronic HTN  Pain control issues  Acute Blood Loss Anemia requiring ICU admission and PRBCs  DIC          Plan:   Transfusions based on IUC recommendations  Remove KENNY  Continue to watch Urine Output  Transfer to floor when able           Interval History:   She is currently receiving 2 more units of blood. She has neck pain, most likely MS pain. ICU aware.              Physical Exam:   Temp: 98.4  F (36.9  C) Temp src: Oral BP: 111/67 Pulse: 105   Resp: (!) 38 SpO2: 96 % O2 Device: None (Room air)    Vitals:    25 1100 25 1158   Weight: 91.2 kg (201 lb) 93 kg (205 lb)     Vital Signs with Ranges  Temp:  [97.8  F (36.6  C)-99.8  F (37.7  C)] 98.4  F (36.9  C)  Pulse:  [] 105  Resp:  [16-67] 38  BP: ()/() 111/67  Cuff Mean (mmHg):  [109] 109  SpO2:  [84 %-100 %] 96 %  I/O last 3 completed shifts:  In: 5288 [P.O.:1680; I.V.:2255]  Out: 2943 [Urine:980; Blood:1963]    Uterine fundus is firm, non-tender and at the level of the umbilicus  Incision Bandage on          Data:     Recent Results (from the past 24 hours)   CBC with platelets    Collection Time: 25 11:32 AM   Result Value Ref Range    WBC Count 7.6 4.0 - 11.0 10e3/uL    RBC Count 3.78 (L) 3.80 - 5.20 10e6/uL    Hemoglobin 10.6 (L) 11.7 - 15.7 g/dL    Hematocrit 31.0 (L) 35.0 - 47.0 %    MCV 82 78 - 100 fL    MCH 28.0 26.5 - 33.0 pg    MCHC 34.2 31.5 - 36.5 g/dL    RDW 13.3 10.0 - 15.0 %    Platelet Count 211 150 - 450 10e3/uL   Treponema Abs w Reflex to RPR and Titer    Collection Time: 25 11:32 AM    Result Value Ref Range    Treponema Antibody Total Nonreactive Nonreactive   Adult Type and Screen    Collection Time: 01/16/25 11:32 AM   Result Value Ref Range    ABO/RH(D) A POS     Antibody Screen Negative Negative    SPECIMEN EXPIRATION DATE [id number]    Extra Green Top Tube (LAB USE ONLY)    Collection Time: 01/16/25 11:32 AM   Result Value Ref Range    Hold Specimen JIC    Extra Blue Top Tube    Collection Time: 01/16/25 11:32 AM   Result Value Ref Range    Hold Specimen JIC    Glucose by meter    Collection Time: 01/16/25 12:41 PM   Result Value Ref Range    GLUCOSE BY METER POCT 65 (L) 70 - 99 mg/dL   Glucose by meter    Collection Time: 01/16/25 12:59 PM   Result Value Ref Range    GLUCOSE BY METER POCT 80 70 - 99 mg/dL   Hemoglobin    Collection Time: 01/16/25  3:55 PM   Result Value Ref Range    Hemoglobin 9.6 (L) 11.7 - 15.7 g/dL   Platelet count    Collection Time: 01/16/25  3:55 PM   Result Value Ref Range    Platelet Count 210 150 - 450 10e3/uL   INR    Collection Time: 01/16/25  3:55 PM   Result Value Ref Range    INR 1.13 0.85 - 1.15   Partial thromboplastin time    Collection Time: 01/16/25  3:55 PM   Result Value Ref Range    aPTT 35 22 - 38 Seconds   Fibrinogen activity    Collection Time: 01/16/25  3:55 PM   Result Value Ref Range    Fibrinogen Activity 309 170 - 510 mg/dL   Extra Green Top (Lithium Heparin) Tube    Collection Time: 01/16/25  3:55 PM   Result Value Ref Range    Hold Specimen JIC    Glucose by meter    Collection Time: 01/16/25  5:27 PM   Result Value Ref Range    GLUCOSE BY METER POCT 148 (H) 70 - 99 mg/dL   Prepare red blood cells (unit)    Collection Time: 01/16/25  6:27 PM   Result Value Ref Range    Blood Component Type Red Blood Cells     Product Code C7210V55     Unit Status Transfused     Unit Number [id number]     CROSSMATCH Compatible     CODING SYSTEM [id number]     ISSUE DATE AND TIME 18140135292961     UNIT ABO/RH A+     UNIT TYPE ISBT 6200    INR     Collection Time: 01/16/25  7:08 PM   Result Value Ref Range    INR 1.67 (H) 0.85 - 1.15   Partial thromboplastin time    Collection Time: 01/16/25  7:08 PM   Result Value Ref Range    aPTT 36 22 - 38 Seconds   Fibrinogen    Collection Time: 01/16/25  7:08 PM   Result Value Ref Range    Fibrinogen Activity 78 (LL) 170 - 510 mg/dL   D dimer quantitative    Collection Time: 01/16/25  7:08 PM   Result Value Ref Range    D-Dimer Quantitative >20.00 (HH) 0.00 - 0.50 ug/mL FEU   CBC with platelets    Collection Time: 01/16/25  7:08 PM   Result Value Ref Range    WBC Count 14.8 (H) 4.0 - 11.0 10e3/uL    RBC Count 2.66 (L) 3.80 - 5.20 10e6/uL    Hemoglobin 7.6 (L) 11.7 - 15.7 g/dL    Hematocrit 22.5 (L) 35.0 - 47.0 %    MCV 85 78 - 100 fL    MCH 28.6 26.5 - 33.0 pg    MCHC 33.8 31.5 - 36.5 g/dL    RDW 14.0 10.0 - 15.0 %    Platelet Count 156 150 - 450 10e3/uL   Prepare plasma (unit)    Collection Time: 01/16/25  9:35 PM   Result Value Ref Range    Blood Component Type Plasma     Product Code F3520Q16     Unit Status Transfused     Unit Number [id number]     CODING SYSTEM DUEU926     ISSUE DATE AND TIME 87799730648831     UNIT ABO/RH A+     UNIT TYPE ISBT 6200    Prepare cryoprecipitate (5-pack units)    Collection Time: 01/16/25  9:37 PM   Result Value Ref Range    Blood Component Type Cryoprecipitate     Product Code E4006H00     Unit Status Transfused     Unit Number [id number]     CODING SYSTEM ILNH216     ISSUE DATE AND TIME 20250116214100     UNIT ABO/RH A+     UNIT TYPE ISBT 6200    INR    Collection Time: 01/17/25  1:37 AM   Result Value Ref Range    INR 1.24 (H) 0.85 - 1.15   Partial thromboplastin time    Collection Time: 01/17/25  1:37 AM   Result Value Ref Range    aPTT 26 22 - 38 Seconds   Fibrinogen    Collection Time: 01/17/25  1:37 AM   Result Value Ref Range    Fibrinogen Activity 192 170 - 510 mg/dL   D dimer quantitative    Collection Time: 01/17/25  1:37 AM   Result Value Ref Range    D-Dimer  Quantitative >20.00 (HH) 0.00 - 0.50 ug/mL FEU   CBC with platelets    Collection Time: 01/17/25  1:37 AM   Result Value Ref Range    WBC Count 14.8 (H) 4.0 - 11.0 10e3/uL    RBC Count 2.15 (L) 3.80 - 5.20 10e6/uL    Hemoglobin 6.1 (LL) 11.7 - 15.7 g/dL    Hematocrit 18.3 (L) 35.0 - 47.0 %    MCV 85 78 - 100 fL    MCH 28.4 26.5 - 33.0 pg    MCHC 33.3 31.5 - 36.5 g/dL    RDW 13.9 10.0 - 15.0 %    Platelet Count 141 (L) 150 - 450 10e3/uL   Prepare red blood cells (unit)    Collection Time: 01/17/25  1:53 AM   Result Value Ref Range    Blood Component Type Red Blood Cells     Product Code Q6633V23     Unit Status Transfused     Unit Number [id number]     CROSSMATCH Compatible     CODING SYSTEM EYZT836     ISSUE DATE AND TIME 57557170419555     UNIT ABO/RH A+     UNIT TYPE ISBT 6200    Prepare red blood cells (unit)    Collection Time: 01/17/25  2:12 AM   Result Value Ref Range    Blood Component Type Red Blood Cells     Product Code K3682R40     Unit Status Not used     Unit Number [id number]     CROSSMATCH Compatible     CODING SYSTEM HEKI431     UNIT ABO/RH A+     UNIT TYPE ISBT 6200    ECG 12-LEAD WITH MUSE (LHE)    Collection Time: 01/17/25  5:36 AM   Result Value Ref Range    Systolic Blood Pressure  mmHg    Diastolic Blood Pressure  mmHg    Ventricular Rate 110 BPM    Atrial Rate 110 BPM    MD Interval 126 ms    QRS Duration 70 ms     ms    QTc 481 ms    P Axis 40 degrees    R AXIS 48 degrees    T Axis 29 degrees    Interpretation ECG       Sinus tachycardia  Nonspecific ST and T wave abnormality  Abnormal ECG  When compared with ECG of 07-Jan-2025 16:22,  Nonspecific T wave abnormality, worse in Inferior leads  Nonspecific T wave abnormality now evident in Lateral leads  Confirmed by VIANEY FISCHER MD LOC:JN ([id number]) on 1/17/2025 7:57:24 AM     ALT    Collection Time: 01/17/25  7:30 AM   Result Value Ref Range    ALT 8 0 - 50 U/L   AST    Collection Time: 01/17/25  7:30 AM   Result Value Ref Range     AST 21 0 - 45 U/L   Creatinine    Collection Time: 01/17/25  7:30 AM   Result Value Ref Range    Creatinine 0.83 0.51 - 0.95 mg/dL    GFR Estimate >90 >60 mL/min/1.73m2   INR    Collection Time: 01/17/25  7:30 AM   Result Value Ref Range    INR 1.11 0.85 - 1.15   Partial thromboplastin time    Collection Time: 01/17/25  7:30 AM   Result Value Ref Range    aPTT 26 22 - 38 Seconds   Fibrinogen    Collection Time: 01/17/25  7:30 AM   Result Value Ref Range    Fibrinogen Activity 219 170 - 510 mg/dL   D dimer quantitative    Collection Time: 01/17/25  7:30 AM   Result Value Ref Range    D-Dimer Quantitative 14.27 (H) 0.00 - 0.50 ug/mL FEU   CBC with platelets    Collection Time: 01/17/25  7:30 AM   Result Value Ref Range    WBC Count 15.0 (H) 4.0 - 11.0 10e3/uL    RBC Count 2.32 (L) 3.80 - 5.20 10e6/uL    Hemoglobin 6.6 (LL) 11.7 - 15.7 g/dL    Hematocrit 19.6 (L) 35.0 - 47.0 %    MCV 85 78 - 100 fL    MCH 28.4 26.5 - 33.0 pg    MCHC 33.7 31.5 - 36.5 g/dL    RDW 14.1 10.0 - 15.0 %    Platelet Count 161 150 - 450 10e3/uL   Basic metabolic panel    Collection Time: 01/17/25  7:30 AM   Result Value Ref Range    Sodium 137 135 - 145 mmol/L    Potassium 3.7 3.4 - 5.3 mmol/L    Chloride 107 98 - 107 mmol/L    Carbon Dioxide (CO2) 20 (L) 22 - 29 mmol/L    Anion Gap 10 7 - 15 mmol/L    Urea Nitrogen 10.3 6.0 - 20.0 mg/dL    Creatinine 0.83 0.51 - 0.95 mg/dL    GFR Estimate >90 >60 mL/min/1.73m2    Calcium 7.8 (L) 8.8 - 10.4 mg/dL    Glucose 167 (H) 70 - 99 mg/dL   Prepare red blood cells (unit)    Collection Time: 01/17/25  8:02 AM   Result Value Ref Range    Blood Component Type Red Blood Cells     Product Code W7793E05     Unit Status Transfused     Unit Number [id number]     CROSSMATCH Compatible     CODING SYSTEM XTOI894     ISSUE DATE AND TIME 17007956378539     UNIT ABO/RH A+     UNIT TYPE ISBT 6200    Prepare red blood cells (unit)    Collection Time: 01/17/25  8:02 AM   Result Value Ref Range    Blood Component Type  Red Blood Cells     Product Code K7233R76     Unit Status Ready for issue     Unit Number [id number]     CROSSMATCH Compatible     CODING SYSTEM GOXN459    Ionized Calcium    Collection Time: 01/17/25  8:31 AM   Result Value Ref Range    Calcium Ionized Whole Blood 4.5 4.4 - 5.2 mg/dL     Recent Labs   Lab Test 01/16/25  1132   AS Negative     Recent Labs   Lab Test 01/17/25  0730 01/17/25  0137   HGB 6.6* 6.1*     Recent Labs   Lab Test 12/06/24  1234   RUQIGG Positive       Eva Zaman MD

## 2025-01-17 NOTE — PROGRESS NOTES
1238 SW consult received for elevated risk score. Presented to ICU and spoke with bedside RN. She requests SW return later as now is not a good time to meet with patient.    1520 Returned to ICU to visit patient. She had been transferred back to Postpartum. This writer went to see patient in her postpartum room and she was visiting her  in the NICU. Will attempt visit tomorrow.    DONALDO Broderick

## 2025-01-17 NOTE — ANESTHESIA POSTPROCEDURE EVALUATION
Patient: Shital Tamayo    Procedure: Procedure(s):  EXAM UNDER ANESTHESIA, GYNECOLOGIC       Anesthesia Type:  General    Note:  Disposition: Inpatient   Postop Pain Control: Uneventful            Sign Out: Well controlled pain   PONV: No   Neuro/Psych: Uneventful            Sign Out: Acceptable/Baseline neuro status   Airway/Respiratory: Uneventful            Sign Out: AIRWAY IN SITU/Resp. Support   CV/Hemodynamics: Uneventful            Sign Out: Acceptable CV status; No obvious hypovolemia; No obvious fluid overload   Other NRE: NONE   DID A NON-ROUTINE EVENT OCCUR? No           Last vitals:  Vitals Value Taken Time   /91 01/16/25 1946   Temp 37.1  C (98.8  F) 01/16/25 1946   Pulse 98 01/16/25 1950   Resp 16 01/16/25 1949   SpO2 98 % 01/16/25 1950   Vitals shown include unfiled device data.    Electronically Signed By: Prem Barnes MD  January 16, 2025  8:04 PM

## 2025-01-17 NOTE — PROGRESS NOTES
Patient transferred to ICU room 357 in stable condition. Margaret suction reconnected, minimal amount of blood noted in tubing and collected in cannister. Report given to JA Monaco. Patient care relinquished at this time. Writer to maintain hourly Margaret assessments.     Charly Harden RN

## 2025-01-17 NOTE — PLAN OF CARE
Goal Outcome Evaluation:       [facility] RN up to floor to help this RN get patient oob and into w/c.  Patient transferred well with expected surgical pain with pivot transfer from bed to w/c.  VSS and no signs of bleeding at that time.  Patient was then taken downstairs with nursing staff to NICU to see her new baby. This RN gave a face to face report to [facility] RN who took over care.

## 2025-01-17 NOTE — ANESTHESIA PREPROCEDURE EVALUATION
Anesthesia Pre-Procedure Evaluation    Patient: Shital Tamayo   MRN: [id number] : 1995        Procedure : Procedure(s):  EXAM UNDER ANESTHESIA, GYNECOLOGIC          Past Medical History:   Diagnosis Date    ADHD (attention deficit hyperactivity disorder)     Anxiety     Depressive disorder       Past Surgical History:   Procedure Laterality Date    AS RAD RESEC TONSIL/PILLARS Bilateral 2015     SECTION N/A 2022    Procedure:  SECTION;  Surgeon: Becki Nieves MD;  Location: [facility]    DILATION AND CURETTAGE N/A 2019    Procedure: DILATION AND CURETTAGE, UTERUS, USING SUCTION;  Surgeon: Becki Nieves MD;  Location: [facility];  Service: Obstetrics      Allergies   Allergen Reactions    Ondansetron Anxiety, Other (See Comments) and Dizziness     Jittery, claustrophobic, feels bugs crawling on skin      Social History     Tobacco Use    Smoking status: Never     Passive exposure: Never    Smokeless tobacco: Never   Substance Use Topics    Alcohol use: No     Comment: Alcoholic Drinks/day: occasional use      Wt Readings from Last 1 Encounters:   25 93 kg (205 lb)        Anesthesia Evaluation            ROS/MED HX  ENT/Pulmonary:  - neg pulmonary ROS     Neurologic:  - neg neurologic ROS     Cardiovascular:  - neg cardiovascular ROS     METS/Exercise Tolerance:     Hematologic:  - neg hematologic  ROS   (+)      anemia,          Musculoskeletal:  - neg musculoskeletal ROS     GI/Hepatic:  - neg GI/hepatic ROS     Renal/Genitourinary:  - neg Renal ROS     Endo:  - neg endo ROS     Psychiatric/Substance Use:  - neg psychiatric ROS     Infectious Disease:  - neg infectious disease ROS     Malignancy:       Other:      (+) Possibly pregnant, , ,         Physical Exam    Airway        Mallampati: III   TM distance: > 3 FB   Neck ROM: full     Respiratory Devices and Support         Dental       (+) Minor Abnormalities - some fillings, tiny  chips      Cardiovascular   cardiovascular exam normal          Pulmonary   pulmonary exam normal                OUTSIDE LABS:  CBC:   Lab Results   Component Value Date    WBC 7.6 01/16/2025    WBC 8.7 01/07/2025    HGB 9.6 (L) 01/16/2025    HGB 10.6 (L) 01/16/2025    HCT 31.0 (L) 01/16/2025    HCT 32.2 (L) 01/07/2025     01/16/2025     01/16/2025     BMP:   Lab Results   Component Value Date     01/07/2025     (L) 01/06/2025    POTASSIUM 3.4 01/07/2025    POTASSIUM 3.7 01/06/2025    CHLORIDE 106 01/07/2025    CHLORIDE 103 01/06/2025    CO2 22 01/07/2025    CO2 22 01/06/2025    BUN 5.3 (L) 01/07/2025    BUN 5.2 (L) 01/06/2025    CR 0.61 01/07/2025    CR 0.55 01/06/2025     (H) 01/16/2025    GLC 80 01/16/2025     COAGS:   Lab Results   Component Value Date    PTT 35 01/16/2025    INR 1.13 01/16/2025    FIBR 309 01/16/2025     POC:   Lab Results   Component Value Date    HCG Negative 09/23/2014     HEPATIC:   Lab Results   Component Value Date    ALBUMIN 3.6 01/07/2025    PROTTOTAL 6.3 (L) 01/07/2025    ALT 13 01/07/2025    AST 19 01/07/2025    ALKPHOS 117 01/07/2025    BILITOTAL 0.2 01/07/2025     OTHER:   Lab Results   Component Value Date    OSWALDO 9.0 01/07/2025    MAG 1.7 12/07/2024    TSH 0.50 08/03/2024       Anesthesia Plan    ASA Status:  2    NPO Status:  ELEVATED Aspiration Risk/Unknown    Anesthesia Type: General.     - Airway: ETT   Induction: Intravenous, Propofol.   Maintenance: Inhalation.        Consents    Anesthesia Plan(s) and associated risks, benefits, and realistic alternatives discussed. Questions answered and patient/representative(s) expressed understanding.     - Discussed: Risks, Benefits and Alternatives for BOTH SEDATION and the PROCEDURE were discussed     - Discussed with:  Patient       Use of blood products discussed: Yes.     - Discussed with: Patient.     - Consented: consented to blood products     Postoperative Care    Pain management: IV analgesics,  "Oral pain medications.   PONV prophylaxis: Ondansetron (or other 5HT-3), Dexamethasone or Solumedrol     Comments:               Prem Barnes MD    I have reviewed the pertinent notes and labs in the chart from the past 30 days and (re)examined the patient.  Any updates or changes from those notes are reflected in this note.               # Hypertension: Noted on problem list      # Anemia: based on hgb <11  #Post Partum Hemorrhage: 1542 mL, will continue to monitor and treat/transfuse as appropriate.       # Obesity: Estimated body mass index is 32.11 kg/m  as calculated from the following:    Height as of this encounter: 1.702 m (5' 7\").    Weight as of this encounter: 93 kg (205 lb).             "

## 2025-01-17 NOTE — PROGRESS NOTES
Patient transferred to ICU room 357 in stable condition. Margaret connected to suction, minimal bleeding noted. Bedside report given to JA Monaco. Patient care relinquished at this time.     Charly Harden RN

## 2025-01-17 NOTE — PROGRESS NOTES
Charge RN (writer) was notified that pt was ready to transfer back to Postpartum unit.    Charge RN notified ICU Charge that pt can return to PP RM 24 at 1500. Labor RN Lianet Sanchez accepted pt & resumed cares. RN went to ICU to assist transfer to PP. Pt requested to visit  in NICU prior to arriving to our unit. RN was called back per NICU nurse to assess pt. Pt not feeling well after the transfer, felt weak after pivoting & pale. Of note first time pt has been up since before surgery on . JA Martini brought pt back to room, assisted back to bed. VSS. Enc fluids. Ordered dinner. Handoff report to Charge JA Fernandez & Sydney Valdovinos RN @ 2525.        Geni Fernandez RN Charge

## 2025-01-17 NOTE — PLAN OF CARE
Goal Outcome Evaluation:      Plan of Care Reviewed With: patient    Overall Patient Progress: improvingOverall Patient Progress: improving    Outcome Evaluation: Fibrinogen improved to 192 from 78, Ddimer >20, Hemoglobin dropped to 6.1. Transfused one PRBC . Continue to monitor CBC with platelet, ddimer, PTT, Fibrinogen every 6 hours.  Updated Dr. Falk at 05:00 regarding constant and worsening pain on her right neck/shoulder radiating to her right chest and  back . Anxiety escalating to panic attack. Added Flexeril/ pharmacy dose recommendation, 12 lead EKG done ( nothing concerning per Dr. Singleton ) and PCXR ( reviewed by Dr. Singleton as well, encourage use of Incentive Spirometer). Patient has been refusing to turn due to increasing pain with movement. Tried to call Mom Falguni to update but unable to get hold of her.      Problem: Postpartum ( Delivery)  Goal: Hemostasis  Outcome: Progressing     Problem: Postpartum ( Delivery)  Goal: Optimal Pain Control and Function  Outcome: Progressing  Intervention: Prevent or Manage Pain  Recent Flowsheet Documentation  Taken 2025 0523 by Kierra Garcia RN  Pain Management Interventions:   MD notified (comment)   medication (see MAR)   emotional support   therapeutic presence  Taken 2025 0400 by Kierra Garcia RN  Pain Management Interventions:   medication (see MAR)   emotional support   pain management plan reviewed with patient/caregiver   breathing exercises   repositioned   cold applied  Taken 2025 0300 by Kierra Garcia RN  Pain Management Interventions:   medication (see MAR)   emotional support  Taken 2025 0000 by Kierra Garcia RN  Pain Management Interventions:   medication (see MAR)   emotional support   pain management plan reviewed with patient/caregiver   breathing exercises   repositioned   cold applied     Problem: Anxiety Signs/Symptoms  Goal: Optimized Energy Level (Anxiety  Signs/Symptoms)  Outcome: Progressing

## 2025-01-17 NOTE — PLAN OF CARE
Problem: Postpartum ( Delivery)  Goal: Successful Parent Role Transition  Outcome: Unable to Meet  Mom in ICU and infant in NICU at this time. Unable to complete desired bonding at this time.      Problem: Postpartum ( Delivery)  Goal: Hemostasis  Outcome: Progressing  Margaret in place, hourly checks completed, output minimal, bleeding well controlled.     Problem: Postpartum ( Delivery)  Goal: Effective Bowel Elimination  Outcome: Not Progressing  Not passing flatus yet, no bowel movement. Patient taking Senna.      Problem: Postpartum ( Delivery)  Goal: Optimal Pain Control and Function  Outcome: Not Progressing  Patient not coping well with pain at this time, rating pain 9/10. ICU team managing medications, see MAR for further details.      Problem: Postpartum ( Delivery)  Goal: Effective Urinary Elimination  Outcome: Progressing   Do draining well.     Charly Harden RN

## 2025-01-17 NOTE — ANESTHESIA POSTPROCEDURE EVALUATION
Patient: Shital Tamayo    Procedure: Procedure(s):  REPEAT  SECTION       Anesthesia Type:  Spinal    Note:  Disposition: Inpatient   Postop Pain Control: Uneventful            Sign Out: Well controlled pain   PONV: No   Neuro/Psych: Uneventful            Sign Out: Acceptable/Baseline neuro status   Airway/Respiratory: Uneventful            Sign Out: Acceptable/Baseline resp. status   CV/Hemodynamics: Uneventful            Sign Out: Acceptable CV status; No obvious hypovolemia; No obvious fluid overload   Other NRE: NONE   DID A NON-ROUTINE EVENT OCCUR? No           Last vitals:  Vitals Value Taken Time   /105 25 1806   Temp 37.1  C (98.8  F) 25 1433   Pulse     Resp 22 25 1700   SpO2 99 % 25 1813   Vitals shown include unfiled device data.    Electronically Signed By: Prem Barnes MD  2025  6:18 PM

## 2025-01-17 NOTE — PROGRESS NOTES
Pain finally under better control after removing the pressure bandage and giving 1 mg of Dilaudid IV.  However the bleeding continues.  Her QBL is up over 1500 at this point.  I examined her and found 1 small clot.  She is already received TXA and Cytotec and Pitocin.  Her blood pressures have been high and she has required 1 dose of hydralazine to get her out of the severe range.  I recommended trying a Elva placement.  She agreed to that and unfortunately she was too uncomfortable in the room to be able to place it along with her cervix being very posterior.  Plan will be to take her to the OR for an exam under anesthesia possible D&C and placement of Elva.  I did discuss the potential need for transfusion with its risks and benefits.  She is in agreement with receiving blood as needed.  We will start with 1 unit of packed red blood cells right now.  I also discussed the chance of needing interventional radiology or an the ultimate case doing a hysterectomy if we can get the bleeding under control.

## 2025-01-17 NOTE — ANESTHESIA PROCEDURE NOTES
Airway       Patient location during procedure: OR       Procedure Start/Stop Times: 1/16/2025 6:36 PM  Staff -        Anesthesiologist:  Prem Barnes MD       CRNA: Yvette Tenorio APRN CRNA       Performed By: CRNAIndications and Patient Condition       Indications for airway management: salina-procedural       Induction type:RSI       Mask difficulty assessment: 0 - not attempted    Final Airway Details       Final airway type: endotracheal airway       Successful airway: ETT - single and Oral  Endotracheal Airway Details        ETT size (mm): 7.0       Cuffed: yes       Successful intubation technique: video laryngoscopy       VL Blade Size: Ramos 2       Grade View of Cords: 1       Adjucts: stylet       Position: Right       Measured from: gums/teeth       Secured at (cm): 22       Bite block used: None    Post intubation assessment        Placement verified by: capnometry, equal breath sounds and chest rise        Number of attempts at approach: 1       Number of other approaches attempted: 0       Secured with: tape       Ease of procedure: easy       Dentition: Intact and Unchanged    Medication(s) Administered   Medication Administration Time: 1/16/2025 6:36 PM

## 2025-01-17 NOTE — PROGRESS NOTES
Critical Care Progress Note     2025     Name: Shital Tamayo MRN#: [id number]   Age: 29 year old YOB: 1995        Summary     Past medical history of , preeclampsia, previous  section, ADHD, MDD, DORI    Presented 25 at 34 weeks gestation for planned  section due to severe intrauterine growth restriction. Yasmeen-operative course complicated by uterine atony & hemorrhage refractory to medical management prompting the application of KENNY system       Interval Events     Ongoing pain & anxiety, the KENNY is being removed     Stable hemodynamics & respiratory status     CBC Hgb 6.6, plts 161, INR 1.1, fibrinogen 219     [place] - 1) goal Hgb >7.5, plts >50, fibrinogen>200, PT & PTT  less than 1.5 times the control value       Assessment & Plan      Post  section uterine atony & hemorrhage with consumptive coagulopathy   Refractory to medical management prompting the application of KENNY system now removed   - s/p 4 units PRBCs, 1 unit FFP & 1 unit cryo  - cbc, coagulation indices q6h     Post-op pain including musculoskeletal pain involving trapezius, R-shoulder & neck  The onset of her pain was after general anesthesia & KENNY placement. Nursing reported awkward right sided positioning during the procedure which may have been implicated in her discomfort in addition to neck manipulation during airway device placement   - prn oxycodone, hydromorphone, cyclobenzaprine   - scheduled APAP & NSAID    ADHD, DORI, MDD  - pta aderall, escitalopram    Checklist:  FEN: regular diet   VTE ppx: SCDs  GI ppx: not indicated   Bowel regimen: senna-doc   Lines/tube-size: two PIV, ramos   Skin: lower abdominal surgical site    PT/OT/SLP, early mobility: not consulted   Code Status: full       Physical Exam      Neurologic: Alert & oriented. Full strength throughout.   HEENT: Head and face normal. No nasal discharge. Oropharynx normal. Eyelids, conjunctiva, & sclera normal.   Neck: Neck  appearance normal. No neck masses.    Respiratory: Lungs clear bilaterally. No wheezes, crackles, or rhonchi.   Cardiovascular: Regular rate & rhythm  Normal S1 & S2. No murmurs   Gastrointestinal: Bowel sounds present. Soft. Tender throughout worse over the lower abdomen, dressing in place   Musculoskeletal: Skeletal configuration normal and muscle mass normal for age. Joint appearance overall normal. Tenderness to palpation over right trapezius   Skin, Hair, & Nails: Skin color normal. No skin lesions.  Hair & nails normal.  Extremities: 1+ lower extremity pitting edema       All pertinent vital signs, ventilator settings, I&Os, laboratory, microbiology, ECGs, & imaging data has been personally reviewed. Total subsequent encounter time, excluding procedures, was 50 minutes     SHANNAN Pantoja MD  Critical Care Medicine

## 2025-01-17 NOTE — CONSULTS
CRITICAL CARE CONSULT:    Reason for Consult:  Postpartum hemorrhage    Assessment/Plan:  29 year old female never smoker with a history of ADHD, MDD, DORI, HTN, preeclampsia/HTN with prior , presented on  for repeat  at 34w0d d/t severe IUGR, course c/b postpartum hemorrhage likely due to uterine atony refractory to medical management s/p intrauterine vacuum-induced hemorrhage control device (KENNY system), DIC, transferred to ICU.    PULMONARY/RESPIRATORY:  No acute issues  monitor    CARDIOVASCULAR:  Postpartum hypertension  Sinus tachycardia  Currently no evidence of shock  Continue PTA nifedipine    NEUROLOGIC:  Pain  ADHD  MDD  DORI  Surgical site pain. Uterine atony.  Analgesia per OB/GYN  Ketorolac prn  Hydromorphone prn  Continue home psych meds    GASTROINTESTINAL:  No acute issues  Bowel regimen prn    OB/GYN  S/p repeat  for IUGR on   Cares per OB/GYN  Patient able to visit her son in NICU prior to ICU transfer    RENAL/GENITOURINARY:  No acute issues  monitor    INFECTIOUS DISEASE:  No acute issues  monitor    HEMATOLOGIC/ONCOLOGIC:  Acute blood loss anemia  Coagulopathy  Postpartum uterine blood loss, likely atony. Persisted despite medical management. Went to OR for intrauterine vacuum-induced hemorrhage control device (KENNY system) placement. Initially with 7434-8467 mL out by report, now minimal bleeding. Evidence of DIC with fibrinogen down from 309 to 78, INR 1.7. Hgb 10.6, 9.6, 7.6. Received 1 unit pRBC.  Transfuse 1 unit of cryo  Transfuse 1 unit of FFP  Repeat fibrinogen, INR, Hgb, PLT 1 hr post-transfusion  Clinically hemostasis appears to have been achieved, hemodynamically stable, minimal output from KENNY    ENDOCRINE:  No acute issues  monitor    Checklist:  FEN: ADAT  VTE ppx: SCDs  GI ppx: not indicated  Bowel regimen: prn  VAP ppx: NA  Lines/tube-size:  PIVs  Do placed on   Skin: monitor   PT/OT/SLP, early mobility: as able  Code Status:  "full  Communication: Communicate directly with the patient.  Disposition: The patient is critically ill with postpartum hemorrhage with DIC requiring transfusion resuscitation, at high risk of clinical decompensation.    Restraints  NA    Jeremy Singleton MD  Pulmonary and Critical Care Medicine  [facility]  Office [phone]  he/him    History of Present Illness:  29 year old female never smoker with a history of ADHD, MDD, DORI, HTN, preeclampsia/HTN with prior , presented on  for repeat  at 34w0d d/t severe IUGR, course c/b postpartum hemorrhage likely due to uterine atony refractory to medical management s/p intrauterine vacuum-induced hemorrhage control device (KENNY system), DIC, transferred to ICU. Postpartum uterine blood loss, likely atony. Persisted despite medical management. Went to OR for intrauterine vacuum-induced hemorrhage control device (KENNY system) placement. Initially with 2394-3220 mL out by report, now minimal bleeding. Evidence of DIC with fibrinogen down from 309 to 78, INR 1.7. Hgb 10.6, 9.6, 7.6. Received 1 unit pRBC. Awake, hypertensive. Has 8/10 uterine pain.    Medical & Surgical History:  never smoker with a history of ADHD, MDD, DORI, HTN, preeclampsia/HTN with prior        Family & Social History:  Reviewed in the electronic medical record    Medications:  Reviewed in the electronic medical record.     Allergies:  Reviewed in the electronic medical record.     Physical Exam:  Vent settings for last 24 hours:  Resp: 16    BP (!) 140/99   Pulse 110   Temp 99.5  F (37.5  C) (Oral)   Resp 16   Ht 1.702 m (5' 7\")   Wt 93 kg (205 lb)   LMP 2024   SpO2 100%   Breastfeeding Unknown   BMI 32.11 kg/m      Intake/Output last 3 shifts:  I/O last 3 completed shifts:  In: 200 [P.O.:200]  Out: 836 [Blood:836]  Intake/Output this shift:  I/O this shift:  In: 1500 [I.V.:1000]  Out: 1391 [Urine:400; Blood:991]    Physical Exam  Physical " "Exam:  Gen: alert, oriented, no distress  HEENT: NT, no JAIMIE  CV: tachy, regular, no m/g/r  Resp: CTAB  Abd: postop, soft, BS+, surgical site  Skin: no rashes or lesions  Ext: no edema  Neuro: PERRL, nonfocal exam    IMAGING:  No recent imaging    All pertinent vital signs, I&Os, laboratory, microbiology, ECGs, & imaging data have been personally reviewed. Total critical care time, excluding procedures, was 92 minutes.    Clinically Significant Risk Factors Present on Admission                # Coagulation Defect: INR = 1.67 (Ref range: 0.85 - 1.15) and/or PTT = 36 Seconds (Ref range: 22 - 38 Seconds), will monitor for bleeding    # Hypertension: Noted on problem list      # Anemia: based on hgb <11  #Post Partum Hemorrhage: 1827 mL, will continue to monitor and treat/transfuse as appropriate.       # Obesity: Estimated body mass index is 32.11 kg/m  as calculated from the following:    Height as of this encounter: 1.702 m (5' 7\").    Weight as of this encounter: 93 kg (205 lb).           # Anemia: based on hgb <11                 "

## 2025-01-17 NOTE — PROGRESS NOTES
Report rec'd from JOO Gottlieb RN. Patient care assumed in OR 1. Procedure completed upon assumption of care, patient ready for transfer to PACU. Patient transferred to PACU in stable condition, reports good pain relief and overall feeling better. Report given to PACU   RN by previous primary RN. No bleeding noted around Margaret upon assessment, small amount of bleeding noted in tubing. Writer to stay with patient in PACU until ready to transfer back to L&D.     Charly Harden RN     No

## 2025-01-18 LAB
ALBUMIN SERPL BCG-MCNC: 2.6 G/DL (ref 3.5–5.2)
ALP SERPL-CCNC: 67 U/L (ref 40–150)
ALT SERPL W P-5'-P-CCNC: 10 U/L (ref 0–50)
ANION GAP SERPL CALCULATED.3IONS-SCNC: 8 MMOL/L (ref 7–15)
AST SERPL W P-5'-P-CCNC: 20 U/L (ref 0–45)
BILIRUB SERPL-MCNC: 0.4 MG/DL
BLD PROD TYP BPU: NORMAL
BLOOD COMPONENT TYPE: NORMAL
BUN SERPL-MCNC: 8.7 MG/DL (ref 6–20)
CALCIUM SERPL-MCNC: 7.9 MG/DL (ref 8.8–10.4)
CHLORIDE SERPL-SCNC: 109 MMOL/L (ref 98–107)
CODING SYSTEM: NORMAL
CREAT SERPL-MCNC: 0.61 MG/DL (ref 0.51–0.95)
CROSSMATCH: NORMAL
EGFRCR SERPLBLD CKD-EPI 2021: >90 ML/MIN/1.73M2
GLUCOSE SERPL-MCNC: 101 MG/DL (ref 70–99)
HCO3 SERPL-SCNC: 22 MMOL/L (ref 22–29)
HGB BLD-MCNC: 6.8 G/DL (ref 11.7–15.7)
HGB BLD-MCNC: 8.1 G/DL (ref 11.7–15.7)
HOLD SPECIMEN: NORMAL
ISSUE DATE AND TIME: NORMAL
PLATELET # BLD AUTO: 121 10E3/UL (ref 150–450)
POTASSIUM SERPL-SCNC: 3.9 MMOL/L (ref 3.4–5.3)
PROT SERPL-MCNC: 4.4 G/DL (ref 6.4–8.3)
SODIUM SERPL-SCNC: 139 MMOL/L (ref 135–145)
UNIT ABO/RH: NORMAL
UNIT NUMBER: NORMAL
UNIT STATUS: NORMAL
UNIT TYPE ISBT: 6200
WBC # BLD AUTO: 11.4 10E3/UL (ref 4–11)

## 2025-01-18 PROCEDURE — 85018 HEMOGLOBIN: CPT | Performed by: STUDENT IN AN ORGANIZED HEALTH CARE EDUCATION/TRAINING PROGRAM

## 2025-01-18 PROCEDURE — 99233 SBSQ HOSP IP/OBS HIGH 50: CPT | Performed by: STUDENT IN AN ORGANIZED HEALTH CARE EDUCATION/TRAINING PROGRAM

## 2025-01-18 PROCEDURE — 120N000001 HC R&B MED SURG/OB

## 2025-01-18 PROCEDURE — 250N000013 HC RX MED GY IP 250 OP 250 PS 637: Performed by: OBSTETRICS & GYNECOLOGY

## 2025-01-18 PROCEDURE — 250N000011 HC RX IP 250 OP 636: Performed by: OBSTETRICS & GYNECOLOGY

## 2025-01-18 PROCEDURE — 80053 COMPREHEN METABOLIC PANEL: CPT | Performed by: HOSPITALIST

## 2025-01-18 PROCEDURE — 85049 AUTOMATED PLATELET COUNT: CPT | Performed by: OBSTETRICS & GYNECOLOGY

## 2025-01-18 PROCEDURE — 85018 HEMOGLOBIN: CPT | Performed by: OBSTETRICS & GYNECOLOGY

## 2025-01-18 PROCEDURE — 85048 AUTOMATED LEUKOCYTE COUNT: CPT | Performed by: STUDENT IN AN ORGANIZED HEALTH CARE EDUCATION/TRAINING PROGRAM

## 2025-01-18 PROCEDURE — 250N000013 HC RX MED GY IP 250 OP 250 PS 637: Performed by: STUDENT IN AN ORGANIZED HEALTH CARE EDUCATION/TRAINING PROGRAM

## 2025-01-18 PROCEDURE — 36415 COLL VENOUS BLD VENIPUNCTURE: CPT | Performed by: HOSPITALIST

## 2025-01-18 PROCEDURE — 36415 COLL VENOUS BLD VENIPUNCTURE: CPT | Performed by: STUDENT IN AN ORGANIZED HEALTH CARE EDUCATION/TRAINING PROGRAM

## 2025-01-18 PROCEDURE — P9016 RBC LEUKOCYTES REDUCED: HCPCS | Performed by: STUDENT IN AN ORGANIZED HEALTH CARE EDUCATION/TRAINING PROGRAM

## 2025-01-18 RX ORDER — NIFEDIPINE 30 MG
60 TABLET, EXTENDED RELEASE ORAL DAILY
Status: DISCONTINUED | OUTPATIENT
Start: 2025-01-18 | End: 2025-01-20 | Stop reason: HOSPADM

## 2025-01-18 RX ORDER — OXYCODONE HYDROCHLORIDE 5 MG/1
5-10 TABLET ORAL EVERY 4 HOURS PRN
Status: DISCONTINUED | OUTPATIENT
Start: 2025-01-18 | End: 2025-01-20 | Stop reason: HOSPADM

## 2025-01-18 RX ADMIN — IBUPROFEN 800 MG: 800 TABLET ORAL at 22:05

## 2025-01-18 RX ADMIN — KETOROLAC TROMETHAMINE 30 MG: 30 INJECTION, SOLUTION INTRAMUSCULAR at 06:10

## 2025-01-18 RX ADMIN — ACETAMINOPHEN 975 MG: 325 TABLET ORAL at 05:46

## 2025-01-18 RX ADMIN — ACETAMINOPHEN 975 MG: 325 TABLET ORAL at 12:36

## 2025-01-18 RX ADMIN — OXYCODONE HYDROCHLORIDE 5 MG: 5 TABLET ORAL at 15:23

## 2025-01-18 RX ADMIN — ACETAMINOPHEN 975 MG: 325 TABLET ORAL at 00:25

## 2025-01-18 RX ADMIN — HYDROXYZINE HYDROCHLORIDE 100 MG: 50 TABLET ORAL at 01:19

## 2025-01-18 RX ADMIN — OXYCODONE HYDROCHLORIDE 10 MG: 5 TABLET ORAL at 20:02

## 2025-01-18 RX ADMIN — SENNOSIDES AND DOCUSATE SODIUM 1 TABLET: 50; 8.6 TABLET ORAL at 20:02

## 2025-01-18 RX ADMIN — ESCITALOPRAM OXALATE 20 MG: 20 TABLET ORAL at 10:14

## 2025-01-18 RX ADMIN — NIFEDIPINE 60 MG: 30 TABLET, EXTENDED RELEASE ORAL at 15:45

## 2025-01-18 RX ADMIN — IBUPROFEN 800 MG: 800 TABLET ORAL at 10:14

## 2025-01-18 RX ADMIN — SENNOSIDES AND DOCUSATE SODIUM 1 TABLET: 50; 8.6 TABLET ORAL at 08:44

## 2025-01-18 RX ADMIN — ACETAMINOPHEN 975 MG: 325 TABLET ORAL at 19:01

## 2025-01-18 RX ADMIN — IBUPROFEN 800 MG: 800 TABLET ORAL at 15:45

## 2025-01-18 RX ADMIN — HYDROXYZINE HYDROCHLORIDE 100 MG: 50 TABLET ORAL at 08:44

## 2025-01-18 ASSESSMENT — ACTIVITIES OF DAILY LIVING (ADL)
ADLS_ACUITY_SCORE: 34
ADLS_ACUITY_SCORE: 33
ADLS_ACUITY_SCORE: 34
ADLS_ACUITY_SCORE: 33
ADLS_ACUITY_SCORE: 34
ADLS_ACUITY_SCORE: 36
ADLS_ACUITY_SCORE: 34
ADLS_ACUITY_SCORE: 34
ADLS_ACUITY_SCORE: 33
ADLS_ACUITY_SCORE: 36
ADLS_ACUITY_SCORE: 33
ADLS_ACUITY_SCORE: 34
ADLS_ACUITY_SCORE: 34
ADLS_ACUITY_SCORE: 33
ADLS_ACUITY_SCORE: 34
ADLS_ACUITY_SCORE: 33
ADLS_ACUITY_SCORE: 34
ADLS_ACUITY_SCORE: 33

## 2025-01-18 NOTE — PROGRESS NOTES
Dr. Lezama (hospitalist), to patient room at approx 1825.  Pt was in a deep sleep.  Dr. Lezama attempted to arouse patient, but was unable to.  MD asked to administer Narcan and call a rapid response.  Charge nurse notified.  Rapid response called at 1830.  At 1836 Narcan was administered per MD verbal order.  Dr. Zaman and residents at beside at 1840.  MD updated on patient status.  Dr. Lezama wants to rule out a PE.  Pt transferred to zoom cart at 1847.  SWAT RN transferred pt to imaging.

## 2025-01-18 NOTE — PROGRESS NOTES
JOSE GUADALUPE transported pt to Chest CT for PE Run.    Charge informed pt family of plan & answered questions.    Geni Fernandez RN

## 2025-01-18 NOTE — PLAN OF CARE
"[facility] - ICU    RN Progress Note:            Pertinent Assessments:      Please refer to flowsheet rows for full assessment     VSS, pt arrived to unit restless and reporting \"pins and needles feeling in extremities\", denies pain-falls asleep quickly and then body is very relaxed, will awaken with a jolt and confused where she was and how she got here, \"Why were there a lot of people around me in my room?\" Pt oriented to situation and what had happened to cause her to transfer back to ICU. No family present or at bedside. No PRN narcan given throughout rest of shift. Throughout shift pt became more clear and was remembering what had transpired, she was fearful she had \"\". Pt denies any pain, stated she only wants to stick to pain pills, that she must be sensitive to the IV meds.     Pt reported not having slept in 2 days and requested to be left alone to sleep-pt slept well, pt refusing cares for salina area, sweaty and hot, sheets moist refused changing. Labs deferred to AM per pt request. Incision unchanged.           Key Events - This Shift:       See above                Barriers to Discharge / Downgrade:     none         Point of Contact Update: YES-OR-NO: Yes  If No, reason:   Name:  Phone Number:  Summary of Conversation:         Problem: Adult Inpatient Plan of Care  Goal: Absence of Hospital-Acquired Illness or Injury  Intervention: Prevent Infection  Recent Flowsheet Documentation  Taken 2025 0000 by Annetta Lord, RN  Infection Prevention:   environmental surveillance performed   equipment surfaces disinfected   hand hygiene promoted   rest/sleep promoted  Taken 2025 2221 by Annetta Lord, RN  Infection Prevention:   environmental surveillance performed   equipment surfaces disinfected   hand hygiene promoted   rest/sleep promoted     Problem: Postpartum ( Delivery)  Goal: Fluid and Electrolyte Balance  Outcome: Progressing     Problem: Postpartum " ( Delivery)  Goal: Optimal Pain Control and Function  Intervention: Prevent or Manage Pain  Recent Flowsheet Documentation  Taken 2025 by Annetta Lord, RN  Perineal Care: (pt refused) other (see comments)     Problem: Anxiety Signs/Symptoms  Goal: Improved Sleep (Anxiety Signs/Symptoms)  Outcome: Progressing     Problem: Comorbidity Management  Goal: Blood Pressure in Desired Range  Intervention: Maintain Blood Pressure Management  Recent Flowsheet Documentation  Taken 2025 0000 by Annetta Lord, RN  Medication Review/Management: medications reviewed  Taken 2025 by Annetta Lord, RN  Medication Review/Management: medications reviewed   Goal Outcome Evaluation:      Plan of Care Reviewed With: patient    Overall Patient Progress: no changeOverall Patient Progress: no change    Outcome Evaluation: Become more alert, pain management

## 2025-01-18 NOTE — CONSULTS
INITIAL SOCIAL WORK MATERNITY ASSESSMENT     DATA:      Reason for Social Work Consult: Family /support; elevated risk score     Presenting Information: SW reviewed chart and met with pt in her ICU room. Patient was willing to meet with this writer. She shared that she was longing to be with her baby, Kenney, who is in the NICU.      Living Situation: Pt reports that she lives with her parents and sister along with her older son. They reside in a house in [place].     Social Support/Professional Community Support:  Pt reports having a strong support system including the relationship that she has with the father of her two children, Ryder     Insurance: Patient has insurance in place through the [facility]. She indicates baby will be added to policy automatically.     Source of Financial Support: She denies financial concerns. She is presently not employed. 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     Baby Supplies: Pt reports having all needed baby supplies including crib and car seat. She indicates that she has been trying to get a hold of someone in the [facility] office but they don't answer. She will continue to try.     Mental Health History: Pt endorses a history of mental health concerns that she has dealt with for some time.She indicates she is on lexapro for a history of depression - diagnosed approximately 10 years ago.     History of Postpartum Mood Disorders: Patient denies history of postpartum mood disorders.     Chemical Health History: Chart reviewed. Tox screen sent on baby and is pending final results. This appears to be due to the history recorded in patient's chart of opoid dependence. Patient adamantly denies to this writer that she has a history of opoid dependence but shares that she had been prescribed suboxone for migraines in the past. Tox screen not sent on mom. She denies any history of chemical use or dependency.      INTERVENTION:      SW completed chart  review and collaborated with the multidisciplinary team.   Psychosocial Assessment   Introduction to Maternal Child Health  role and scope of practice   Reviewed Hospital and Community Resources   Assessed Mental Health History and Current Symptoms   Identified stressors, barriers and family concerns   Provided support and active empathetic listening and validation.   Provided psychoeducation on  mood and anxiety disorders, assessed for any current symptoms or history     ASSESSMENT:    Pt open and receptive to meeting with SW today. She appeared tired but overall was in good spirits and was very forthcoming with information in conversation with SW. She endorses feeling tired and confirms that her postpartum recovery has been difficult and unexpected. She shares that she always wanted to have three children but after the complications that happened with this pregnancy, she does not think she would have another pregnancy by choice. Pt reports feeling well prepared and connected to good support and resources in the community. SW provided encouragement to pt to utilize all of the support that she has identified as she transitions home. She is anxious to be with her baby but is assured by hearing that he is doing well.     Patient denies having any safety concerns at home.     Pt reports plan for baby to see the PCP that her other child sees.She did not identify the provider or clinic.     PLAN:   SW offered patient resources and she declines needing any. She appears appropriate and capable of connecting with any needed resources.. Pt denies any further SW needs or questions at this time. No further SW needs identified but please reach out to SW if pt or staff request an additional visit prior to discharge.    SW to follow for  labs as drug screening was done.    DONALDO Broderick

## 2025-01-18 NOTE — PROGRESS NOTES
Patient remains on  the hospitalist service.  We will follow.  Call with questions or concerns.  Janice Zamora MD

## 2025-01-18 NOTE — CONSULTS
[facility] [place]  Consult Note - Hospitalist Service  Date of Admission:  1/16/2025  Consult Requested by: OB/GYN  Reason for Consult: Oxygen requirement now at 10L    Assessment & Plan   Shital Tamayo is a 29 year old female admitted on 1/16/2025 for repeat C/S at 34W due to severe IUGR. Her admission was complicated by post partum hemorrhage requiring intrauterine vacuum induced hemorrhage control device (MARY system) and DIC. She was transferred to ICU for management of DIC. KENNY removed today and patient stepped down to post partum. Hospital service consulted because patient now requiring 10L of oxygen supplementation when she was on RA on stepdown.     #Acute hypoxic respiratory failure secondary to opioid use   #Opioid dependence  -On evaluation, called RRT due to unresponsiveness, low MAP and hypoxic on 10L of oxygen. Patient received one dose Narcan with improvement in her vitals and mentation.   -Cardiotelemetry monitoring  -Prn narcan ordered  -Opioids held. Non opioid pain regimen recommended.   -Transfer to ICU for acute level of care.   -CTPE negative for PE but noted bibasilar atelectasis.   -Continue oxygen supplementation to maintain Sat>92%   -Incentive spirometry  -Will likely need pain management if needs to be on opioids.     #Post C/S for IUGR  #Post partum hemorrhage s/p KENNY   #DIC  #Anemia   -S/p 4 units PRBCS, 1FFP and 1 unit cry   -CBC and Coag Q6H   -No bleeding in the past 2 hours per nursing.   -Rest of care as per OB/Gyn     #Post partum hypertension   #Sinus tachycardia   -Hold PTA Nifedipine given hypotension     #Hyponatremia likely hypotonic   -likely from fluids (iatrogenic)  -BNP 40.   -Given borderline hypotension will hold off on diuresis.   -Monitor UOP. Kidney function normal   -Encourage ambulation to mobilize fluids    #Leukocytosis-persistent but improving  -Lactic acid normal   -No PNA on imaging.   -Continue to monitor     #ADHD  #DORI  #MDD  -Continue  "Adderall and Lexapro          Clinically Significant Risk Factors         # Hyponatremia: Lowest Na = 133 mmol/L in last 2 days, will monitor as appropriate         # Thrombocytopenia: Lowest platelets = 116 in last 2 days, will monitor for bleeding   # Hypertension: Noted on problem list     # Acute Hypoxic Respiratory Failure: Documented O2 saturation < 90%. Continue supplemental oxygen as needed    #Post Partum Hemorrhage: 1963 mL, will continue to monitor and treat/transfuse as appropriate.         # Obesity: Estimated body mass index is 33.56 kg/m  as calculated from the following:    Height as of this encounter: 1.702 m (5' 7\").    Weight as of this encounter: 97.2 kg (214 lb 4.6 oz)., PRESENT ON ADMISSION            Malinda Lezama MD  Hospitalist Service  Securely message with [employer] (more info)  Text page via [facility] Paging/Directory   ______________________________________________________________________    Chief Complaint   Increase oxygen demand     History is obtained from the patient, paper chart, patient's family, and bedside nurse     History of Present Illness   Shital Tamayo is a 29 year old female who ADHD, Anxiety, MDD and opioid dependence admitted  for repeat C/S at 34W due to severe IUGR. Her admission was complicated by post partum hemorrhage requiring intrauterine vacuum induced hemorrhage control device (MARY system) and DIC. She was transferred to ICU for management of DIC. KENNY removed today and patient stepped down to post partum. Per nursing patient was on RA on arrival to unit. Patient ambulated and then then started having desats requiring oxygen supplementation which is now 10L. On evaluation patient minimally arousable to sternal rub. Patient without use of accessory muscles. Extremities are perfused. Vitals done at bedside with MAP of 60, Sats at 75% on 10L. RRT called and Narcan administered. Patient regained consciousness and vitals improved.     Past Medical History    Past Medical " History:   Diagnosis Date    ADHD (attention deficit hyperactivity disorder)     Anxiety     Depressive disorder        Past Surgical History   Past Surgical History:   Procedure Laterality Date    AS RAD RESEC TONSIL/PILLARS Bilateral 2015     SECTION N/A 2022    Procedure:  SECTION;  Surgeon: Becki Nieves MD;  Location: [facility] OR     SECTION N/A 2025    Procedure: REPEAT  SECTION;  Surgeon: Ruddy Maxwell MD;  Location: [facility] OR    DILATION AND CURETTAGE N/A 2019    Procedure: DILATION AND CURETTAGE, UTERUS, USING SUCTION;  Surgeon: Becki Nieves MD;  Location: [facility];  Service: Obstetrics    PELVIC EXAMINATION UNDER ANESTHESIA N/A 2025    Procedure: EXAM UNDER ANESTHESIA, GYNECOLOGIC;  Surgeon: Terence Falk MD;  Location: [facility]       Medications   I have reviewed this patient's current medications       Review of Systems    The 10 point Review of Systems is negative other than noted in the HPI      Social History   I have reviewed this patient's social history and updated it with pertinent information if needed.  Social History     Tobacco Use    Smoking status: Never     Passive exposure: Never    Smokeless tobacco: Never   Vaping Use    Vaping status: Never Used   Substance Use Topics    Alcohol use: No     Comment: Alcoholic Drinks/day: occasional use    Drug use: No        Physical Exam   Vital Signs: Temp: 98.9  F (37.2  C) Temp src: Axillary BP: 116/62 Pulse: 100   Resp: 19 SpO2: 96 % O2 Device: None (Room air) Oxygen Delivery: 1 LPM  Weight: 214 lbs 4.59 oz    Constitutional: asleep, unarousable, and moderately obese  Eyes: pupils equal, round and reactive to light and sclera clear  ENT: atraumatic, face mask on  Respiratory: good air exchange, no retractions, and diminished breath sounds bibasilar  Cardiovascular: regular rate and rhythm and normal S1 and S2  GI: post  gravid abdomen, firm, and tenderness noted in the right lower quadrant and in the left lower quadrant  Musculoskeletal: 1+ edema in BLE     Medical Decision Making       100 MINUTES SPENT BY ME on the date of service doing chart review, history, exam, documentation & further activities per the note.      Data     I have personally reviewed the following data over the past 24 hrs:    11.5 (H)  \   8.0 (L)   / 116 (L)     133 (L) 105 12.7 /  128 (H)   3.7 19 (L) 0.79 \     ALT: 8 AST: 21 AP: N/A TBILI: N/A   ALB: N/A TOT PROTEIN: N/A LIPASE: N/A     Trop: <6 BNP: 40     Procal: 0.18 CRP: N/A Lactic Acid: 1.1       INR:  1.01 PTT:  25   D-dimer:  6.46 (H) Fibrinogen:  290       Imaging results reviewed over the past 24 hrs:   Recent Results (from the past 24 hours)   XR Chest Port 1 View    Narrative    EXAM: XR CHEST PORT 1 VIEW  LOCATION: [facility]  DATE: 1/17/2025    INDICATION: Right chest and back pain.  COMPARISON: CT pulmonary angiogram 6/15/2014.      Impression    IMPRESSION: Shallow inspiration. Elevation of the right hemidiaphragm. Minor strands of linear atelectasis in the lower lungs. No adenopathy or effusion. Normal cardiac size and pulmonary vascularity. Anatomic alignment of the bones and joints.   Monitoring leads have been placed overlying the chest.   CT Chest Pulmonary Embolism w Contrast    Narrative    EXAM: CT CHEST PULMONARY EMBOLISM W CONTRAST  LOCATION: [facility]  DATE: 01/17/2025    INDICATION: Postpartum patient with increased oxygen requirements.  COMPARISON: None.  TECHNIQUE: CT chest pulmonary angiogram during arterial phase injection of IV contrast. Multiplanar reformats and MIP reconstructions were performed. Dose reduction techniques were used.   CONTRAST: Isovue 370, 90 mL.    FINDINGS:  ANGIOGRAM CHEST: Pulmonary arteries are normal caliber and negative for pulmonary emboli. Thoracic aorta is negative for dissection. No CT evidence  of right heart strain.    LUNGS AND PLEURA: Mild subpleural consolidation in the lung bases.    MEDIASTINUM/AXILLAE: Normal.    CORONARY ARTERY CALCIFICATION: None.    UPPER ABDOMEN: Upper abdominal perihepatic and perisplenic ascites.    MUSCULOSKELETAL: Normal.      Impression    IMPRESSION:  1.  No pulmonary embolism.    2.  There is ascites in the upper abdomen surrounding the liver and spleen.    3.  Mild subpleural infiltrates of the lung bases.

## 2025-01-18 NOTE — LACTATION NOTE
This note was copied from a baby's chart.  Mom reported that she is not breastfeeding or pumping.

## 2025-01-18 NOTE — PROGRESS NOTES
Critical Care Addendum    See my consult note from  and Dr. Pantoja's note from today's date for additional details. The patient is a 29 year old female never smoker with a history of ADHD, MDD, DORI, prior h/o anorexia and bulimia, opioid dependence, HTN, preeclampsia/HTN with prior , presented on  for repeat  at 34w0d d/t severe IUGR, course c/b postpartum hemorrhage likely due to uterine atony refractory to medical management s/p intrauterine vacuum-induced hemorrhage control device (KENNY system), DIC, transferred to ICU for transfusion support of DIC, hemorrhage resolved and KENNY removed on , patient with high anxiety level and pain requesting frequent IV opiates, cyclobenzaprine, hydroxyzine, and ketorolac. Less responsive, depressed respirations, hypoxic today responsive to naloxone. Imaging yesterday and today including chest CTA show bibasilar atelectasis consistent with surgery and opiates. Transferred to ICU. Here she is somnolent with mild sinus tachycardia, but SpO2 % on room air, not hypotensive. The patient will need psychiatry and pain team consultations. At this point in her postop course her pain should be manageable with non-opioid analgesia in addition to her baseline psych meds. Needs aggressive IS and PT. She does not currently need a naloxone drip. Will defer management to hospitalist and OB/GYN services but please call if needed.    Jeremy Singleton MD  Pulmonary and Critical Care Medicine  [facility]  Office [phone]  he/him

## 2025-01-18 NOTE — SIGNIFICANT EVENT
Significant Event Note    Time of event: 6:52 PM January 17, 2025    Description of event:  House officer responded to rapid response. When I arrived, hospitalist team and IHOB at bedside. Briefly, patient recently stepped down from ICU in setting of DIC s/p C/S delivery at 33+5 for IUGR, complicated by PPHx s/p KENNY and difficult pain control, required PRBC transfusion. Had been newly requiring supplemental O2.    Initially, per bedside RN patient was minimally responsive w/MAP 60. Narcan given x1, patient's alertness improved. BP improved to ~110/60 w/out IVF resuscitation. Reviewed CXR and EKG from earlier today which were nonrevealing.    On exam:  VSS /81, P 90, T 98.3, O2 100% on 5L oxymask  Gen: lethargic but cooperative, AxOx3, NAD, opened eyes to voice and able to answer questions  CV: HRRR, S1 and S2 w/no appreciable murmurs  Resp: lungs CTA BL on 5L oxymask  Abd: soft, nontender postgravid abdomen  Ext: slight 1+ peripheral edema BL    Plan:  Discussed w/hospitalist at bedside who ordered CTPE run, patient will go down for further imaging  -Resident team will continue to follow along    Discussed with: bedside nurse    Thomas Lane MD

## 2025-01-18 NOTE — PLAN OF CARE
Goal Outcome Evaluation:      Plan of Care Reviewed With: patient    Overall Patient Progress: improving    Outcome Evaluation: MD aware of patient's severe BPs. See prior note. Other vitals within normal limits. Patient ambulates independently and frequents NICU for cares and feeds. Abdominal dressing was remarked by this RN to address new drainage change. Moderate serosangineous fluid present. Patient reports pain is tolerable with PRN oxycodone and scheduled Iburpofen and Tylenol. Voiding spontaneously.

## 2025-01-18 NOTE — PROGRESS NOTES
Coverage in between shifts   Felt a little light headed in NICU standing up, sat down in recliner and felt better + looked better. Fundus firm at umbilicus.  Her goals for today were to see her infant in the NICU, has been unable to see baby since birth. Did skin to skin. States that pain was much lessened now that baby was on her.   RN staying at bedside per NICU request while they get initial skin to skin. Plan to move back to postpartum room after this.

## 2025-01-18 NOTE — PROGRESS NOTES
[facility]    Medicine Progress Note - Hospitalist Service    Date of Admission:  1/16/2025    Assessment & Plan   Shital Tamayo is a 29 year old female admitted on 1/16/2025 for repeat C/S at 34W due to severe IUGR. Her admission was complicated by post partum hemorrhage requiring intrauterine vacuum induced hemorrhage control device (MARY system) and DIC. She was transferred to ICU for management of DIC. KENNY removed 01/17 and patient stepped down to post partum. Hospital service consulted because patient now requiring 10L of oxygen supplementation when she was on RA on stepdown.      Acute hypoxic respiratory failure secondary to opioid use  - resolved  Opioid dependence  - On evaluation, called RRT due to unresponsiveness, low MAP and hypoxic on 10L of oxygen. Patient received one dose Narcan with improvement in her vitals and mentation.   -Cardiotelemetry monitoring  -Prn narcan ordered  -Minimize opioid use  -Transfer to ICU for acute level of care.   -CTPE negative for PE but noted bibasilar atelectasis.   -Continue oxygen supplementation to maintain Sat>92%   -Incentive spirometry  -Will likely need pain management if needs to be on opioids.      Post C/S for IUGR  Post partum hemorrhage s/p KENNY   DIC  Anemia   -S/p 4 units PRBCS, 1FFP and 1 unit cry   -CBC and Coag Q6H   -Hb 6.8, ordered 1u pRBC  - Monitor Hb post transfusion   -Rest of care as per OB/Gyn      Post partum hypertension   Sinus tachycardia   - Resume Nifedipine 60mg     Hyponatremia likely hypotonic - resolved  -likely from fluids (iatrogenic)  -BNP 40.   -Given borderline hypotension will hold off on diuresis.   -Monitor UOP. Kidney function normal   -Encourage ambulation to mobilize fluids     Leukocytosis-persistent but improving  -Lactic acid normal   -No PNA on imaging.   -Continue to monitor      ADHD  DORI  MDD  -Continue Adderall and Lexapro          Diet: Room Service  Advance Diet as Tolerated: Regular Diet  "Adult    DVT Prophylaxis: defer to primary  Do Catheter: PRESENT, indication: ?  (Error. Value could not be saved.), Surgical procedure  Lines: None     Cardiac Monitoring: None  Code Status: Full Code      Clinically Significant Risk Factors         # Hyponatremia: Lowest Na = 133 mmol/L in last 2 days, will monitor as appropriate  # Hyperchloremia: Highest Cl = 109 mmol/L in last 2 days, will monitor as appropriate          # Hypoalbuminemia: Lowest albumin = 2.6 g/dL at 1/18/2025  7:37 AM, will monitor as appropriate   # Thrombocytopenia: Lowest platelets = 116 in last 2 days, will monitor for bleeding   # Hypertension: Noted on problem list     # Acute Hypoxic Respiratory Failure: Documented O2 saturation < 90%. Continue supplemental oxygen as needed    #Post Partum Hemorrhage: 1963 mL, will continue to monitor and treat/transfuse as appropriate.       # Obesity: Estimated body mass index is 33.56 kg/m  as calculated from the following:    Height as of this encounter: 1.702 m (5' 7\").    Weight as of this encounter: 97.2 kg (214 lb 4.6 oz)., PRESENT ON ADMISSION            Social Drivers of Health            Disposition Plan     Medically Ready for Discharge: Anticipated in 2-4 Days             Annel Hinson MD  Hospitalist Service  [facility]  Securely message with [employer] (more info)  Text page via [employer] Paging/Directory   ______________________________________________________________________    Interval History   Patient was examined at bedside, new to me today.  Has mild ongoing vaginal bleed otherwise denies any active bleeding.  Hb 6.8, transfusing 1 unit PRBC.  Hemoglobin posttransfusion    Physical Exam   Vital Signs: Temp: 98.7  F (37.1  C) Temp src: Oral BP: 139/83 Pulse: 99   Resp: 20 SpO2: 98 % O2 Device: None (Room air) Oxygen Delivery: 1 LPM  Weight: 214 lbs 4.59 oz    Constitutional: awake, alert  Respiratory: good air exchange, no retractions, and diminished breath " sounds bibasilar  Cardiovascular: regular rate and rhythm and normal S1 and S2  GI: post gravid abdomen, firm, and tenderness noted in the right lower quadrant and in the left lower quadrant  Musculoskeletal: 1+ edema in BLE     Medical Decision Making       40 MINUTES SPENT BY ME on the date of service doing chart review, history, exam, documentation & further activities per the note.      Data     I have personally reviewed the following data over the past 24 hrs:    11.4 (H)  \   6.8 (LL)   / 121 (L)     139 109 (H) 8.7 /  101 (H)   3.9 22 0.61 \     ALT: 10 AST: 20 AP: 67 TBILI: 0.4   ALB: 2.6 (L) TOT PROTEIN: 4.4 (L) LIPASE: N/A     Trop: <6 BNP: 40     Procal: 0.18 CRP: N/A Lactic Acid: 1.1         Imaging results reviewed over the past 24 hrs:   Recent Results (from the past 24 hours)   CT Chest Pulmonary Embolism w Contrast    Narrative    EXAM: CT CHEST PULMONARY EMBOLISM W CONTRAST  LOCATION: [facility]  DATE: 01/17/2025    INDICATION: Postpartum patient with increased oxygen requirements.  COMPARISON: None.  TECHNIQUE: CT chest pulmonary angiogram during arterial phase injection of IV contrast. Multiplanar reformats and MIP reconstructions were performed. Dose reduction techniques were used.   CONTRAST: Isovue 370, 90 mL.    FINDINGS:  ANGIOGRAM CHEST: Pulmonary arteries are normal caliber and negative for pulmonary emboli. Thoracic aorta is negative for dissection. No CT evidence of right heart strain.    LUNGS AND PLEURA: Mild subpleural consolidation in the lung bases.    MEDIASTINUM/AXILLAE: Normal.    CORONARY ARTERY CALCIFICATION: None.    UPPER ABDOMEN: Upper abdominal perihepatic and perisplenic ascites.    MUSCULOSKELETAL: Normal.      Impression    IMPRESSION:  1.  No pulmonary embolism.    2.  There is ascites in the upper abdomen surrounding the liver and spleen.    3.  Mild subpleural infiltrates of the lung bases.

## 2025-01-18 NOTE — PROGRESS NOTES
Dr. Maxwell notified of patient's 2 severe blood pressures. Nifedipine 60mg restarted now; to be given daily after. Per MD, ok to resume q4 vitals at this time and not implement hypertensive order set. Per MD, ok for patient to go to NICU at this time.

## 2025-01-18 NOTE — PROVIDER NOTIFICATION
Hello,   can you please come to patient bedside room 24 post partum. she is very difficult to arouse, she is not oriented when we are able to wake her with sternal rub. critical lab hemoglobin, lower blood pressures again. please call [facility] unit

## 2025-01-18 NOTE — PROGRESS NOTES
D:  Patient admitted to [id number] via wheelchair without  and support person, her mother. Infant in NICU  A:  Report received from PP charge RN that was given from ICU RN. Oriented patient and family to surroundings; call light within reach. R:  Patient and  tolerated transfer. Care assumed.

## 2025-01-19 LAB
ALT SERPL W P-5'-P-CCNC: 11 U/L (ref 0–50)
AST SERPL W P-5'-P-CCNC: 25 U/L (ref 0–45)
CREAT SERPL-MCNC: 0.61 MG/DL (ref 0.51–0.95)
EGFRCR SERPLBLD CKD-EPI 2021: >90 ML/MIN/1.73M2
HGB BLD-MCNC: 7.8 G/DL (ref 11.7–15.7)
PLATELET # BLD AUTO: 134 10E3/UL (ref 150–450)
WBC # BLD AUTO: 9.2 10E3/UL (ref 4–11)

## 2025-01-19 PROCEDURE — 250N000013 HC RX MED GY IP 250 OP 250 PS 637: Performed by: OBSTETRICS & GYNECOLOGY

## 2025-01-19 PROCEDURE — 36415 COLL VENOUS BLD VENIPUNCTURE: CPT | Performed by: OBSTETRICS & GYNECOLOGY

## 2025-01-19 PROCEDURE — 84450 TRANSFERASE (AST) (SGOT): CPT | Performed by: OBSTETRICS & GYNECOLOGY

## 2025-01-19 PROCEDURE — 99232 SBSQ HOSP IP/OBS MODERATE 35: CPT | Performed by: STUDENT IN AN ORGANIZED HEALTH CARE EDUCATION/TRAINING PROGRAM

## 2025-01-19 PROCEDURE — 85018 HEMOGLOBIN: CPT | Performed by: OBSTETRICS & GYNECOLOGY

## 2025-01-19 PROCEDURE — 82565 ASSAY OF CREATININE: CPT | Performed by: OBSTETRICS & GYNECOLOGY

## 2025-01-19 PROCEDURE — 85049 AUTOMATED PLATELET COUNT: CPT | Performed by: OBSTETRICS & GYNECOLOGY

## 2025-01-19 PROCEDURE — 85048 AUTOMATED LEUKOCYTE COUNT: CPT | Performed by: STUDENT IN AN ORGANIZED HEALTH CARE EDUCATION/TRAINING PROGRAM

## 2025-01-19 PROCEDURE — 250N000011 HC RX IP 250 OP 636: Performed by: OBSTETRICS & GYNECOLOGY

## 2025-01-19 PROCEDURE — 84460 ALANINE AMINO (ALT) (SGPT): CPT | Performed by: OBSTETRICS & GYNECOLOGY

## 2025-01-19 PROCEDURE — 120N000001 HC R&B MED SURG/OB

## 2025-01-19 RX ORDER — DIPHENHYDRAMINE HYDROCHLORIDE 50 MG/ML
25-50 INJECTION INTRAMUSCULAR; INTRAVENOUS EVERY 6 HOURS PRN
Status: DISCONTINUED | OUTPATIENT
Start: 2025-01-19 | End: 2025-01-20 | Stop reason: HOSPADM

## 2025-01-19 RX ORDER — DIPHENHYDRAMINE HCL 25 MG
25-50 CAPSULE ORAL EVERY 6 HOURS PRN
Status: DISCONTINUED | OUTPATIENT
Start: 2025-01-19 | End: 2025-01-20 | Stop reason: HOSPADM

## 2025-01-19 RX ORDER — BENZOCAINE/MENTHOL 6 MG-10 MG
LOZENGE MUCOUS MEMBRANE 2 TIMES DAILY
Status: DISCONTINUED | OUTPATIENT
Start: 2025-01-19 | End: 2025-01-20 | Stop reason: HOSPADM

## 2025-01-19 RX ADMIN — ACETAMINOPHEN 975 MG: 325 TABLET ORAL at 13:11

## 2025-01-19 RX ADMIN — OXYCODONE HYDROCHLORIDE 10 MG: 5 TABLET ORAL at 21:57

## 2025-01-19 RX ADMIN — OXYCODONE HYDROCHLORIDE 10 MG: 5 TABLET ORAL at 09:40

## 2025-01-19 RX ADMIN — OXYCODONE HYDROCHLORIDE 10 MG: 5 TABLET ORAL at 18:11

## 2025-01-19 RX ADMIN — ACETAMINOPHEN 975 MG: 325 TABLET ORAL at 06:07

## 2025-01-19 RX ADMIN — IBUPROFEN 800 MG: 800 TABLET ORAL at 20:29

## 2025-01-19 RX ADMIN — SENNOSIDES, DOCUSATE SODIUM 2 TABLET: 50; 8.6 TABLET, FILM COATED ORAL at 08:17

## 2025-01-19 RX ADMIN — DIPHENHYDRAMINE HYDROCHLORIDE 25 MG: 25 CAPSULE ORAL at 21:58

## 2025-01-19 RX ADMIN — KETOROLAC TROMETHAMINE 30 MG: 30 INJECTION, SOLUTION INTRAMUSCULAR at 13:14

## 2025-01-19 RX ADMIN — DEXTROAMPHETAMINE SULFATE, DEXTROAMPHETAMINE SACCHARATE, AMPHETAMINE SULFATE AND AMPHETAMINE ASPARTATE 30 MG: 3.75; 3.75; 3.75; 3.75 CAPSULE, EXTENDED RELEASE ORAL at 08:18

## 2025-01-19 RX ADMIN — OXYCODONE HYDROCHLORIDE 10 MG: 5 TABLET ORAL at 14:01

## 2025-01-19 RX ADMIN — IBUPROFEN 800 MG: 800 TABLET ORAL at 09:39

## 2025-01-19 RX ADMIN — DIPHENHYDRAMINE HYDROCHLORIDE 50 MG: 50 INJECTION, SOLUTION INTRAMUSCULAR; INTRAVENOUS at 15:59

## 2025-01-19 RX ADMIN — ACETAMINOPHEN 975 MG: 325 TABLET ORAL at 00:25

## 2025-01-19 RX ADMIN — IBUPROFEN 800 MG: 800 TABLET ORAL at 04:09

## 2025-01-19 RX ADMIN — NIFEDIPINE 60 MG: 30 TABLET, EXTENDED RELEASE ORAL at 08:18

## 2025-01-19 RX ADMIN — ACETAMINOPHEN 975 MG: 325 TABLET ORAL at 20:29

## 2025-01-19 RX ADMIN — HYDROCORTISONE: 1 CREAM TOPICAL at 18:15

## 2025-01-19 RX ADMIN — OXYCODONE HYDROCHLORIDE 10 MG: 5 TABLET ORAL at 04:09

## 2025-01-19 RX ADMIN — ESCITALOPRAM OXALATE 20 MG: 20 TABLET ORAL at 08:18

## 2025-01-19 ASSESSMENT — ACTIVITIES OF DAILY LIVING (ADL)
ADLS_ACUITY_SCORE: 33

## 2025-01-19 NOTE — PROGRESS NOTES
Progress Note    OB Complications:  C/S at 33 weeks  Severe IUGR  Chronic HTN  Pain control issues  Acute Blood Loss Anemia requiring ICU admission and PRBCs  DIC    Doing well. Denies PIH symptoms. Normal lochia. Pain controlled when she takes scheduled medications. Denies headache, changes in vision or RUQ pain.     Temp:  [97.9  F (36.6  C)-98.6  F (37  C)] 98.6  F (37  C)  Pulse:  [] 89  Resp:  [16-18] 18  BP: (115-175)/(62-99) 138/89  SpO2:  [96 %-100 %] 100 %    NAD, AAO x 3  Abdomen soft, appropriate post op discomfort  Dressing - dry serosanguinous blood.   No c/c/e    Cr 0.61  ALT 11  AST 25  Hgb: 6.8 --> 8.1 --> 7.8  Plts 121 --> 134    A/P: 28 yo POD #3 s/p C/S for severe IUGR and CHTN. Delivery complicated by PPH. She returned to the OR for KENNY placement. She was subsequently monitored in the ICU. She had an episode of somnolence after return to L+D. She was treated with Narcan x 4. She was then transferred back to the ICU for further monitoring.   -- CHTN - Nifedipine 60 mg QD was restarted yesterday. BP improved today but she had severe range Bps yesterday. Would monitor today and likely discharge tomorrow.   -- Post op pain management - Pain consult was placed. Currently using Oxycodone 10 mg Q4 prn. Discussed would appreciate pain helping us develop a safe discharge plan given she did require Narcan.   -- Psych consult pending.     Maru Rios MD, FACOG  (P) [phone]

## 2025-01-19 NOTE — PLAN OF CARE
Goal Outcome Evaluation:      Plan of Care Reviewed With: patient    Overall Patient Progress: improving    Outcome Evaluation: Patient's vital signs are within normal limits. Patient ambulates independently and frequents NICU for cares and feeds. Patient reports pain is tolerable with PRN oxycodone and scheduled Iburpofen and Tylenol. Voiding spontaneously. Abdominal dressing drainage has not change.

## 2025-01-19 NOTE — LACTATION NOTE
This note was copied from a baby's chart.  Reason for Initial Lactation Visit: Lactation consultant to patient room per lactation order as infant in NICU.    Reason for infant admission to NICU: Admitted directly to the NICU for evaluation and management of respiratory failure, pneumothorax, slow feeding of the  and prematurity.     Gestational Age at Delivery: 34w0d     Current Age: 3 do     Method of Feedings: NG started PO feeding bottles      Breastfeeding goals:undecided- unsure how long she wants to pump or if she wants baby to breastfeed- keep breastfeeding plan fluid, going to support milk supply with pumping and then reevaluate, mom had a 3 day stay in ICU infant is now 72 hours and mom pumped 4 ml.      Past breastfeeding experience: pumped for one month     Maternal health risk that may affect breastfeeding: Hypertension, Anxiety, Depression.   Mom tox screen positive which can be explained by her current medication.       Breast Assessment: Breasts: Round and Symmetrical, Nipples: Everted    Feeding Assessment:  no feeding     Pumping Assessment:  FDK reviewed and set breast pump, briefly discussed engorgement and gave mom ice to place on breast.     Pumped 3 ml using 24mm flanges.     Pump for home use: will need symphony at discharge.      Education:  [x] First drops kit  [] Benefits of breast milk  [x] How breast milk is made  [x] Stages of milk production  [x] Milk supply/goal volumes  [x] Hand expression  [x] Collecting, labeling, transporting milk  [x] Cleaning, sanitizing pump parts  [x] Storage of milk  [x] Importance of pumping minimum of 8x in 24 hours   [x] Hands on Pumping   [x] Hospital grade pump use and care  [x] Initiate setting   [] Maintain setting  [x] How to rent a hospital grade breast pump  [] Engorgement  [] Latch and positioning   [] Signs of milk transfer  [] Nipple shield use and education   [] Nuzzling  [x] Review how to access lactation consultant prn

## 2025-01-19 NOTE — PLAN OF CARE
Goal Outcome Evaluation:      Plan of Care Reviewed With: patient    Overall Patient Progress: improvingOverall Patient Progress: improving    VSS. Up ad sapna. Scant amount of lochia, no clots noted. Incision covered, dressing marked and unchanged. Tylenol, Ibuprofen, and oxycodone for pain control. BS audible/active x4, tolerating PO, denies N/V. Voiding. Patient visiting infant in NICU during the evening and tolerating well, appears to be coping well with infant in NICU. Patient resting overnight between cares. Significant other and Mother not present overnight, but present during the evening and supportive. Continue with plan of care.

## 2025-01-19 NOTE — PROGRESS NOTES
[facility]    Medicine Progress Note - Hospitalist Service    Date of Admission:  1/16/2025    Assessment & Plan   Shital Tamayo is a 29 year old female admitted on 1/16/2025 for repeat C/S at 34W due to severe IUGR. Her admission was complicated by post partum hemorrhage requiring intrauterine vacuum induced hemorrhage control device (MARY system) and DIC. She was transferred to ICU for management of DIC. KENNY removed 01/17 and patient stepped down to post partum. Hospital service consulted because patient now requiring 10L of oxygen supplementation when she was on RA on stepdown.      Acute hypoxic respiratory failure secondary to opioid use  - resolved  Opioid dependence  - On evaluation, called RRT due to unresponsiveness, low MAP and hypoxic on 10L of oxygen. Patient received multiple doses Narcan with improvement in her vitals and mentation.   -Cardiotelemetry monitoring  -Prn narcan ordered  -Minimize opioid use, using oxycodone 10mg every 4 hrs  -CTPE negative for PE but noted bibasilar atelectasis.   -Continue oxygen supplementation to maintain Sat>92%   -Incentive spirometry  -Will need pain management as patient requiring multiple episodes of opioids     Post C/S for IUGR  Post partum hemorrhage s/p KENNY   DIC  Anemia   -S/p 4 units PRBCS, 1FFP and 1 unit cry   -CBC and Coag Q6H   -Hb 6.8, ordered 1u pRBC -> 8.1 -> 7.8  -Oral Iron at discharge  -Rest of care as per OB/Gyn      Post partum hypertension   Sinus tachycardia   - Resume Nifedipine 60mg 01/18  - Intermittently elevated, but improved     Hyponatremia likely hypotonic - resolved  -likely from fluids (iatrogenic)  -BNP 40.   -Monitor UOP. Kidney function normal   -Encourage ambulation to mobilize fluids     Leukocytosis-resolved  -Lactic acid normal   -No PNA on imaging.   -Continue to monitor      ADHD  DORI  MDD  -Continue Adderall and Lexapro          Diet: Room Service  Advance Diet as Tolerated: Regular Diet Adult   "  DVT Prophylaxis: defer to primary  Do Catheter: Not present  Lines: None     Cardiac Monitoring: None  Code Status: Full Code      Clinically Significant Risk Factors         # Hyponatremia: Lowest Na = 133 mmol/L in last 2 days, will monitor as appropriate  # Hyperchloremia: Highest Cl = 109 mmol/L in last 2 days, will monitor as appropriate          # Hypoalbuminemia: Lowest albumin = 2.6 g/dL at 1/18/2025  7:37 AM, will monitor as appropriate   # Thrombocytopenia: Lowest platelets = 116 in last 2 days, will monitor for bleeding   # Hypertension: Noted on problem list       #Post Partum Hemorrhage: 1963 mL, will continue to monitor and treat/transfuse as appropriate.       # Obesity: Estimated body mass index is 33.06 kg/m  as calculated from the following:    Height as of this encounter: 1.702 m (5' 7\").    Weight as of this encounter: 95.8 kg (211 lb 1.6 oz)., PRESENT ON ADMISSION            Social Drivers of Health            Disposition Plan     Medically Ready for Discharge: Anticipated Tomorrow             Annel Hinson MD  Hospitalist Service  [employer] [employer]  Securely message with [employer] (more info)  Text page via [facility] Paging/Directory   ______________________________________________________________________    Interval History   Patient was examined at the bedside, states she feels much better today.  Denies any new complaints today.  States bleeding is significantly improved/minimal.    Will be seen by pain team tomorrow as patient requiring multiple doses of oxycodone  Anticipate discharge tomorrow if stable    Physical Exam   Vital Signs: Temp: 97.9  F (36.6  C) Temp src: Oral BP: 138/89 Pulse: 93   Resp: 18 SpO2: 100 % O2 Device: None (Room air)    Weight: 211 lbs 1.6 oz    Constitutional: awake, alert  Respiratory: good air exchange, no retractions, and diminished breath sounds bibasilar  Cardiovascular: regular rate and rhythm and normal S1 and S2  GI: post gravid " abdomen, firm, and tenderness noted in the right lower quadrant and in the left lower quadrant  Musculoskeletal: 1+ edema in BLE     Medical Decision Making       38 MINUTES SPENT BY ME on the date of service doing chart review, history, exam, documentation & further activities per the note.      Data     I have personally reviewed the following data over the past 24 hrs:    9.2  \   7.8 (L)   / 134 (L)     N/A N/A N/A /  N/A   N/A N/A 0.61 \     ALT: 11 AST: 25 AP: N/A TBILI: N/A   ALB: N/A TOT PROTEIN: N/A LIPASE: N/A       Imaging results reviewed over the past 24 hrs:   No results found for this or any previous visit (from the past 24 hours).

## 2025-01-20 VITALS
WEIGHT: 206.8 LBS | HEART RATE: 90 BPM | DIASTOLIC BLOOD PRESSURE: 92 MMHG | SYSTOLIC BLOOD PRESSURE: 143 MMHG | TEMPERATURE: 98.5 F | HEIGHT: 67 IN | RESPIRATION RATE: 20 BRPM | BODY MASS INDEX: 32.46 KG/M2 | OXYGEN SATURATION: 100 %

## 2025-01-20 PROBLEM — O11.9 CHRONIC HYPERTENSION WITH SUPERIMPOSED PREECLAMPSIA: Status: ACTIVE | Noted: 2025-01-20

## 2025-01-20 LAB
ALT SERPL W P-5'-P-CCNC: 16 U/L (ref 0–50)
AST SERPL W P-5'-P-CCNC: 26 U/L (ref 0–45)
CREAT SERPL-MCNC: 0.66 MG/DL (ref 0.51–0.95)
EGFRCR SERPLBLD CKD-EPI 2021: >90 ML/MIN/1.73M2
HGB BLD-MCNC: 8.2 G/DL (ref 11.7–15.7)
PATH REPORT.COMMENTS IMP SPEC: NORMAL
PATH REPORT.COMMENTS IMP SPEC: NORMAL
PATH REPORT.FINAL DX SPEC: NORMAL
PATH REPORT.GROSS SPEC: NORMAL
PATH REPORT.MICROSCOPIC SPEC OTHER STN: NORMAL
PATH REPORT.RELEVANT HX SPEC: NORMAL
PHOTO IMAGE: NORMAL
PLATELET # BLD AUTO: 185 10E3/UL (ref 150–450)

## 2025-01-20 PROCEDURE — 85018 HEMOGLOBIN: CPT | Performed by: OBSTETRICS & GYNECOLOGY

## 2025-01-20 PROCEDURE — 84460 ALANINE AMINO (ALT) (SGPT): CPT | Performed by: OBSTETRICS & GYNECOLOGY

## 2025-01-20 PROCEDURE — 84450 TRANSFERASE (AST) (SGOT): CPT | Performed by: OBSTETRICS & GYNECOLOGY

## 2025-01-20 PROCEDURE — 36415 COLL VENOUS BLD VENIPUNCTURE: CPT | Performed by: OBSTETRICS & GYNECOLOGY

## 2025-01-20 PROCEDURE — 82565 ASSAY OF CREATININE: CPT | Performed by: OBSTETRICS & GYNECOLOGY

## 2025-01-20 PROCEDURE — 250N000013 HC RX MED GY IP 250 OP 250 PS 637: Performed by: OBSTETRICS & GYNECOLOGY

## 2025-01-20 PROCEDURE — 99254 IP/OBS CNSLTJ NEW/EST MOD 60: CPT | Performed by: NURSE PRACTITIONER

## 2025-01-20 PROCEDURE — 85049 AUTOMATED PLATELET COUNT: CPT | Performed by: OBSTETRICS & GYNECOLOGY

## 2025-01-20 RX ORDER — OXYCODONE HYDROCHLORIDE 5 MG/1
5 TABLET ORAL EVERY 6 HOURS PRN
Qty: 20 TABLET | Refills: 0 | Status: SHIPPED | OUTPATIENT
Start: 2025-01-20

## 2025-01-20 RX ORDER — LABETALOL 200 MG/1
200 TABLET, FILM COATED ORAL EVERY 12 HOURS SCHEDULED
Status: DISCONTINUED | OUTPATIENT
Start: 2025-01-20 | End: 2025-01-20 | Stop reason: HOSPADM

## 2025-01-20 RX ORDER — IBUPROFEN 800 MG/1
800 TABLET, FILM COATED ORAL EVERY 6 HOURS
Qty: 30 TABLET | Refills: 0 | Status: SHIPPED | OUTPATIENT
Start: 2025-01-20

## 2025-01-20 RX ORDER — LABETALOL 200 MG/1
200 TABLET, FILM COATED ORAL EVERY 12 HOURS
Qty: 60 TABLET | Refills: 0 | Status: SHIPPED | OUTPATIENT
Start: 2025-01-20

## 2025-01-20 RX ADMIN — ACETAMINOPHEN 975 MG: 325 TABLET ORAL at 02:02

## 2025-01-20 RX ADMIN — IBUPROFEN 800 MG: 800 TABLET ORAL at 02:02

## 2025-01-20 RX ADMIN — ACETAMINOPHEN 975 MG: 325 TABLET ORAL at 08:19

## 2025-01-20 RX ADMIN — IBUPROFEN 800 MG: 800 TABLET ORAL at 08:19

## 2025-01-20 RX ADMIN — NIFEDIPINE 60 MG: 30 TABLET, EXTENDED RELEASE ORAL at 08:19

## 2025-01-20 RX ADMIN — HYDROCORTISONE: 1 CREAM TOPICAL at 08:24

## 2025-01-20 RX ADMIN — DEXTROAMPHETAMINE SULFATE, DEXTROAMPHETAMINE SACCHARATE, AMPHETAMINE SULFATE AND AMPHETAMINE ASPARTATE 30 MG: 3.75; 3.75; 3.75; 3.75 CAPSULE, EXTENDED RELEASE ORAL at 08:30

## 2025-01-20 RX ADMIN — ESCITALOPRAM OXALATE 20 MG: 20 TABLET ORAL at 08:19

## 2025-01-20 RX ADMIN — LABETALOL HYDROCHLORIDE 200 MG: 200 TABLET ORAL at 10:20

## 2025-01-20 RX ADMIN — OXYCODONE HYDROCHLORIDE 10 MG: 5 TABLET ORAL at 08:30

## 2025-01-20 ASSESSMENT — ACTIVITIES OF DAILY LIVING (ADL)
ADLS_ACUITY_SCORE: 33

## 2025-01-20 NOTE — CONSULTS
[facility] ACUTE PAIN SERVICE CONSULTATION   [facility], [facility], [facility], [facility], [place]     Date of Admission:  1/16/2025  Date of Consult (When I saw the patient): 01/20/25     Assessment/Plan:     Shital Tamayo is a 29 year old female who was admitted on 1/16/2025.  Pain team was asked to see the patient for requiring multiple doses of opioids, Kindly eval. Admitted for repeat C/S at 34W due to severe IUGR. Her admission was complicated by post partum hemorrhage requiring intrauterine vacuum induced hemorrhage control device (MARY system) and DIC. She was transferred to ICU for management of DIC. History of ADHD, anxiety, depression, migraines, previous Suboxone use for migraine pain.  Describes pain as 3-4/10 and aching in the low abdomen over incision.     Post op day: 4 Days Post-Op.     Plans for discharge, pain team will sign off.     PLAN:   1) Pain is consistent with postoperative pain in the setting of chronic migraines on previous Suboxone.  Acute hypoxic respiratory failure secondary to opioids status post administration of naloxone, resolved.  Multimodal Medication Therapy  Topical: Consider  NSAID'S: CrCl Estimated Creatinine Clearance: 147.9 mL/min (based on SCr of 0.66 mg/dL).  Ibuprofen 800 mg every 6 hours.  Discontinue IV Toradol as needed  Steroids: None  Muscle Relaxants: None  Adjuvants: Acetaminophen 975 mg every 6 hours  Antidepressants/anxiolytics: Adderall 30 mg daily, Lexapro 20 mg daily, hydroxyzine 100 mg 3 times daily as needed  Opioids: Oxycodone 5-10 mg every 4 hours as needed  IV Pain medication: None  Non-medication interventions: Ice, Rest, and Distraction (TV, Music, Reading)  Constipation Prophylaxis: Senna docusate, suppository as needed    -MN  pulled from system on 1/20/2025. This indicates fills for Adderall, Percocet, Suboxone  1/12/2025 Adderall 30 mg #30 for 30 days by Ruddy Maxwell  1/9/2025 Percocet 5-3 25 #14 for 7 days by Ruddy Maxwell  1/7/2025  Suboxone 8-2 mg sublingual film #62 for 30 days by Ladi Dallas  2025 Suboxone 8-2 mg sublingual film #13 for 5 days by Ladi Dallas    Discharge Recommendations - We recommend prescribing the following at the time of discharge: Oxycodone 5-10 mg x 3 days supply, acetaminophen, NSAIDs.      History of Present Illness (HPI):       Shital Tamayo is a 29 year old female who presented for repeat C/S at 34W due to severe IUGR. Her admission was complicated by post partum hemorrhage requiring intrauterine vacuum induced hemorrhage control device (MARY system) and DIC. She was transferred to ICU for management of DIC.  Past medical history as above. The pain is reported to be acute post op pain, chronic migraines. Current pain is rated at 3-4/10 and goal is 0-2/10.  The patient denies nausea, vomiting, chest pain, shortness of breath, dizziness, fever, and chills.     Per MN  review, the patient does not have an opioid tolerance. Opioid induced side effects noted and include: None    Reviewed medical record, labs, imaging, ED note, and care everywhere.     Home pain medications/psych medications/anticoagulation medications include: Adderall 30 mg daily, Lexapro 20 mg daily, hydroxyzine 100 mg 3 times daily as needed for anxiety.  Previous fills for Suboxone -patient reports has not used in 1 to 2 months      Medical History   PAST MEDICAL HISTORY:   Past Medical History:   Diagnosis Date    ADHD (attention deficit hyperactivity disorder)     Anxiety     Depressive disorder        PAST SURGICAL HISTORY:   Past Surgical History:   Procedure Laterality Date    AS RAD RESEC TONSIL/PILLARS Bilateral 2015     SECTION N/A 2022    Procedure:  SECTION;  Surgeon: Becki Nieves MD;  Location: [facility] OR     SECTION N/A 2025    Procedure: REPEAT  SECTION;  Surgeon: Ruddy Maxwell MD;  Location: [facility] OR    DILATION AND CURETTAGE N/A  03/01/2019    Procedure: DILATION AND CURETTAGE, UTERUS, USING SUCTION;  Surgeon: Becki Nieves MD;  Location: [facility];  Service: Obstetrics    PELVIC EXAMINATION UNDER ANESTHESIA N/A 1/16/2025    Procedure: EXAM UNDER ANESTHESIA, GYNECOLOGIC;  Surgeon: Terence Falk MD;  Location: [facility]       FAMILY HISTORY: Reviewed    SOCIAL HISTORY:   Social History     Tobacco Use    Smoking status: Never     Passive exposure: Never    Smokeless tobacco: Never   Substance Use Topics    Alcohol use: No     Comment: Alcoholic Drinks/day: occasional use        HEALTH & LIFESTYLE PRACTICES  Tobacco:  reports that she has never smoked. She has never been exposed to tobacco smoke. She has never used smokeless tobacco.  Alcohol:  reports no history of alcohol use.  Illicit drugs:  reports no history of drug use.    Allergies  Allergies   Allergen Reactions    Ondansetron Anxiety, Other (See Comments) and Dizziness     Jittery, claustrophobic, feels bugs crawling on skin       Problem List  Patient Active Problem List    Diagnosis Date Noted    PIH (pregnancy induced hypertension) 01/16/2025     Priority: Medium    Headache 12/29/2024     Priority: Medium    Preeclampsia 12/07/2024     Priority: Medium    Chronic hypertension affecting pregnancy 12/07/2024     Priority: Medium    Other migraine with status migrainosus, intractable 11/02/2022     Priority: Medium    Opioid use disorder 11/02/2022     Priority: Medium    Pre-eclampsia 06/14/2022     Priority: Medium    Elevated blood pressure affecting pregnancy in third trimester, antepartum 05/25/2022     Priority: Medium    Pregnancy 05/25/2022     Priority: Medium    Encounter for triage in pregnant patient 05/16/2022     Priority: Medium    Depression 09/23/2014     Priority: Medium       Prior to Admission Medications   Medications Prior to Admission   Medication Sig Dispense Refill Last Dose/Taking    ADDERALL XR 30 MG 24 hr capsule Take 30  "mg by mouth daily.   1/16/2025    aspirin-acetaminophen-caffeine (EXCEDRIN MIGRAINE) 250-250-65 MG tablet Take 1 tablet by mouth daily as needed for headaches.   Unknown    escitalopram (LEXAPRO) 20 MG tablet Take 20 mg by mouth daily.   1/15/2025    hydrOXYzine (ATARAX) 50 MG tablet Take 2 tablets (100 mg) by mouth 3 times daily as needed for anxiety 40 tablet 0 1/16/2025    labetalol (NORMODYNE) 100 MG tablet Take 100 mg by mouth 2 times daily.   1/16/2025 Morning    NIFEdipine ER (ADALAT CC) 30 MG 24 hr tablet Take 1 tablet (30 mg) by mouth 2 times daily.   1/16/2025       Review of Systems  Complete ROS reviewed, unless noted in HPI, all other systems reviewed (with patient) and all others found to be negative.      Objective:     Physical Exam:  BP (!) 141/97 (BP Location: Left arm)   Pulse 83   Temp 98.1  F (36.7  C) (Oral)   Resp 18   Ht 1.702 m (5' 7\")   Wt 93.8 kg (206 lb 12.8 oz)   LMP 05/14/2024   SpO2 100%   Breastfeeding Unknown   BMI 32.39 kg/m    Weight:   Vitals:    01/17/25 2221 01/19/25 0835 01/20/25 0837   Weight: 97.2 kg (214 lb 4.6 oz) 95.8 kg (211 lb 1.6 oz) 93.8 kg (206 lb 12.8 oz)      Body mass index is 32.39 kg/m .    General Appearance:  Alert, cooperative, no distress, up in a chair   Head:  Normocephalic, without obvious abnormality, atraumatic   Eyes:  PERRL, conjunctiva/corneas clear, EOM's intact   ENT/Throat: Lips, mucosa, and tongue normal; teeth and gums normal   Lymph/Neck: Supple, symmetrical, trachea midline   Lungs:   Clear to auscultation bilaterally, respirations unlabored, room air   Chest Wall:  No tenderness or deformity   Cardiovascular/Heart:  Regular rate and rhythm, S1, S2 normal   Abdomen:   Soft, tender around incision, incision is covered, drainage noted on bandage.   Musculoskeletal: Extremities normal, atraumatic   Skin: Skin warm, dry   Neurologic: Alert and oriented X 3, Moves all 4 extremities     Psych: Affect is calm, pleasant, cooperative, " appropriate     Imaging: Reviewed I have personally reviewed pertinent notes, labs, tests, and radiologic imaging in patient's chart.  Labs: Reviewed I have personally reviewed pertinent notes, labs, tests, and radiologic imaging in patient's chart.  Notes: Reviewed I have personally reviewed pertinent notes, labs, tests, and radiologic imaging in patient's chart.    Total time spent 65 minutes with greater than 50% in consultation, education and coordination of care.   Also discussed with RN.   Treatment plan includes: multimodal pain approach, Hospital Medicine Service for medical management, OB/Gyn.   Patient educated regarding: multimodal pain approach and medications as listed above.   Elements of Medical Decision Making as described above. Acute or chronic illness or injury or surgery. High risk therapy including opioids, high risk drug therapy including oral and/or parenteral controlled substances, tapering off opioids as pain improves, discharge medications.    Patient is understanding of the plan. All questions and concerns addressed to patient's satisfaction.     Thank you for this consultation.    MIAH Meza-C  Acute Care Inpatient Pain Management Program  [facility])  Hours of coverage Monday-Friday [phone]. After hours please contact Primary team   Page via Epic chat or [employer]

## 2025-01-20 NOTE — PROGRESS NOTES
All discharge education completed including danger signs and information/resources on postpartum mood disorders and incision care this morning with patient. Patient verbalizes understanding and denies having further questions.     Follow up planned for home care RN on Wednesday 1/22. Instructed to see primary care physician within 3-5 days of discharge to review blood pressures. Instructed patient not to make that appointment on Wednesday due to home care appointment. Also has an appointment with Dr. Maxwell in 8 days, and will scheduled a 6 week postpartum visit or sooner if symptoms arise.     Reviewed medications, medication prescriptions sent to patients pharmacy in [place].     Patient agrees with discharge plan. Patient left the unit after discharge at 1315.     Liza Cortes RN on 1/20/2025 at 1:12 PM

## 2025-01-20 NOTE — DISCHARGE INSTRUCTIONS
*You have a Home Care nurse visit planned for Wednesday, 1/22/25. The nurse will contact you after discharge to confirm the appointment time. If you do not hear from the nurse by Wednesday morning, please call [phone].   (Please do not schedule a clinic appointment on the same day as home nurse visit.)      Warning Signs after Having a Baby    Keep this paper on your fridge or somewhere else where you can see it.    Call your provider if you have any of these symptoms up to 12 weeks after having your baby.    Thoughts of hurting yourself or your baby  Pain in your chest or trouble breathing  Severe headache not helped by pain medicine  Eyesight concerns (blurry vision, seeing spots or flashes of light, other changes to eyesight)  Fainting, shaking or other signs of a seizure    Call 9-1-1 if you feel that it is an emergency.     The symptoms below can happen to anyone after giving birth. They can be very serious. Call your provider if you have any of these warning signs.    My provider s phone number: _______________________    Losing too much blood (hemorrhage)    Call your provider if you soak through a pad in less than an hour or pass blood clots bigger than a golf ball. These may be signs that you are bleeding too much.    Blood clots in the legs or lungs    After you give birth, your body naturally clots its blood to help prevent blood loss. Sometimes this increased clotting can happen in other areas of the body, like the legs or lungs. This can block your blood flow and be very dangerous.     Call your provider if you:  Have a red, swollen spot on the back of your leg that is warm or painful when you touch it.   Are coughing up blood.     Infection    Call your provider if you have any of these symptoms:  Fever of 100.4 F (38 C) or higher.  Pain or redness around your stitches if you had an incision.   Any yellow, white, or green fluid coming from places where you had stitches or surgery.    Mood Problems  (postpartum depression)    Many people feel sad or have mood changes after having a baby. But for some people, these mood swings are worse.     Call your provider right away if you feel so anxious or nervous that you can't care for yourself or your baby.    Preeclampsia (high blood pressure)    Even if you didn't have high blood pressure when you were pregnant, you are at risk for the high blood pressure disease called preeclampsia. This risk can last up to 12 weeks after giving birth.     Call your provider if you have:   Pain on your right side under your rib cage  Sudden swelling in the hands and face    Remember: You know your body. If something doesn't feel right, get medical help.     For informational purposes only. Not to replace the advice of your health care provider. Copyright 2020 [facility]. All rights reserved. Clinically reviewed by Araceli Armenta, RNC-OB, MSN. [employer] 195659 - Rev 02/23.    Pumping Plan    Goal is to pump for 15-20 minutes 8-10 times in 24 hours. (During the daytime pump every 2-3 hours and overnight every 3-4 hours)   Pump your breasts until milk stops dripping and breasts are soft. A soft and well drained breast supports milk supply.   Pumping logs can be helpful in staying on a schedule.  Pumping bra or band can allow you to be able to gently massage breasts while you pump to maximize milk removal.  Turn suction up until a deep tug is felt.  Pumping should not cause pain, if so...   -Turn down suction level   -Use nipple cream before and after pumping   -Check nipple placement in flange    Contact a lactation consultant for further guidance and support.     [employer]:                Engorgement     Before breastfeeding or pumping:  Soften breast tissue by applying a warm damp compress, shower, bathtub and/or dangle breasts in bowl of warm water for 2-5 minutes before breastfeeding.   Soften areola using reverse pressure softening. Place your fingers on  either side of the nipple. Push gently but firmly straight inward toward your ribs. Hold the pressure steady for 30-60 seconds.  Repeat with your fingers above and below the nipple. (See illustration below.) Goal is for your areola to be soft like room temperature butter.  Breast lift: Lay on your back and lift and hold your breast upwards with both hands for 1-2 minutes until areola is soft.   Breast gymnastics: Start by stoking breast from nipple to armpit x10 (pressure like petting a cat), stroke any firm areas towards armpit x10. Hold breast and move slowly and gently in circles in both directions. This helps to stretch milk ducts and prevent blocked ducts.                      After breastfeeding:  Ice packs on breasts for up to 20 minutes as needed.  Talk to your healthcare provider about anti-inflammatory medications like ibuprofen.  If still uncomfortably full, pump, stopping when breasts are more comfortable.

## 2025-01-20 NOTE — PLAN OF CARE
Goal Outcome Evaluation:      Plan of Care Reviewed With: patient    Overall Patient Progress: improvingOverall Patient Progress: improving      VSS except BPs 140s-150s/80s-90s, denies HA, epigastric pain, and vision changes, DTR brisk, 1 beat of clonus left foot x1. Up ad sapna. Scant amount of lochia, no clots noted. Incision covered, dressing marked and unchanged. Tylenol, Ibuprofen, and oxycodone for pain control. BS audible/active x4, tolerating PO, denies N/V. Voiding. Patient visiting infant in NICU during the evening and tolerating well, appears to be coping well with infant in NICU. Patient resting overnight between cares. Significant other and Mother not present overnight, but present during the evening and supportive. Continue with plan of care.

## 2025-01-20 NOTE — LACTATION NOTE
"This note was copied from a baby's chart.  Shital is unsure if she wants to breastfeed or pump. She wants to resume medications for mental health and migraines and was unsure if these are safe with breasfeeding. The following information was provided from Dr. Ulloa's database.      Medications:      Dr. Ulloa's \"Lactation Risk Categories\"  categories according to [employer] Medications and Mothers' Milk, 2023 by Dino Ulloa.    L1 Compatible:  Drugs which has been taken by a large number of breastfeeding mothers without any observed increase in adverse effects in the infant. Controlled studies in breastfeeding women fail to demonstrate a risk to the infant and the possibility of harm to the breastfeeding infant is remote: or the product is not orally bioavailable in an infant.     L2 Probably Compatible:   Drug which has been studied in a limited number of breastfeeding women without an increase in adverse effects in the infant. And/or, the evidence of a demonstrated risk which is likely to follow use of this medication in a breastfeeding woman is remote.     L3 Probably Compatible:   There are no controlled studies in breastfeeding women; however, the risk of untoward effects to a  infant is possible, or controlled studies show only minimal non threatening adverse effects. Drugs should be given only if the potential benefit justifies the potential risk to the infant. (New medications that have absolutely no published data are automatically categorized in this category, regardless of how safe they may be.)    L4 Potentially Hazardous:  There is positive evidence of risk to a  infant or to breast-milk production, but the benefits from use in breastfeeding mothers may be acceptable despite the risk to the infant (e.g., if the drug is needed in a life-threatening situation or for a serious disease for which safer drugs cannot be used or are ineffective.)    L5 Hazardous:  Studies in breastfeeding mothers " have demonstrated that there is significant and documented risk to the infant based on human experience, or it is a medication that has a high risk of causing significant damage to an infant. The risk of using the drug in breastfeeling women clearly outweighs any possible benefit from breastfeeding. The drug is contraindicated in women who are breastfeeding an infant.     Relevant medications reviewed, Ulloa Category and infant monitoring considerations included:   Tizanidine L4 sedation , dry mouth , vomiting, and weakness  Topiramate (Topamax) L3 sedation , irritability, not waking to feed/ poor feeding , diarrhea, and weight loss  Sumatriptan (Imitrex) L3 not waking to feed/ poor feeding  and drowsiness and vomiting  Escitalopram (Lexapro) L2 sedation , irritability, not waking to feed/ poor feeding , and poor weight gain  Dextroamphetamine (Adderall) L3 tremor, poor weight gain, and agitation, hyperactivity, insomnia      Plan: Parent is taking 1 or more medications that may be contraindicated with breastfeeding or potentially sedating for infant. Sticky note to pediatric provider to please review lactation note and make recommendations.

## 2025-01-20 NOTE — LACTATION NOTE
"This note was copied from a baby's chart.  NICU Follow up:    Gestational Age at Delivery: 34w0d     Corrected Gestational Age: 34w4d    Current Age: 4 days old    Method of Feedings: Bottle, NG     Breastfeeding goals: undecided, leaning towards not breastfeeding/pumping    Breast Assessment: NA    Hand Hugs/STS/Nuzzling/Latching/ Weighted feeds: Not currently putting baby to breast.     Visit Summary: Shital reports she hasn't pumped \"in a while.\" She reports pumping is painful and LC offered to help with a pumping session.Shital is also discharging today so we disucssed options for home pump. Patient/Bedside RN aware patient would need HGP rental or purchase before discharge. Verbalized LC team will support whatever she decided for breastfeeding/pumping, but did review if she is undecided it is easier to wean from pumping than to build a supply down the road.     See previous note about medications.     Pumping Volume p/24hours: unsure     Adjustments to Plan:     Education:  [] First drops kit  [] Benefits of breast milk  [] How breast milk is made  [] Stages of milk production  [] Milk supply/goal volumes  [] Hand expression  [] Collecting, labeling, transporting milk  [] Cleaning, sanitizing pump parts  [] Storage of milk  [x] Importance of pumping minimum of 8x in 24 hours   [] Hands on Pumping   [] Hospital grade pump use and care  [] Initiate setting   [] Maintain setting  [x] How to rent a hospital grade breast pump  [] Engorgement  [] Increasing milk supply  [] Mastitis/Ductal narrowing  [] Weighted feeding  [] Nipple shield  [] Latch and positioning   [] Signs of milk transfer  [] Nuzzling  [] Review how to access lactation consultant prn   [] Outpatient Lactation  [] Feeding Plan for home  "

## 2025-01-20 NOTE — PLAN OF CARE
"Patients most recent blood pressure 143/92. No headache, visual disturbances, right epigastric pain. Bilateral lower edema +2. Patient has blood pressure cuff at home; \"know your numbers\" reviewed with patient as well as how to check blood pressure at home. All questions answered.     Dr. Burton discharging patient today.     All other postpartum assessments within normal limits.     Pain managed with Ibuprofen, Tylenol and PRN Oxycodone. Patient seen by pain consult team member today. Dr. Burton said there is no need for pysch consult.     Patient eager to go home today.     Liza Cortes RN on 1/20/2025 at 12:26 PM            "

## 2025-01-20 NOTE — DISCHARGE SUMMARY
HOSPITAL DISCHARGE SUMMARY -  Birth    Patient Name: Shital Tamayo   YOB: 1995  Age: 29 year old  Medical Record Number: [id number]  Primary Physician: Siobhan Carter See    Admission Date:  2025  Delivery Date:   2025  Gestational Age at Delivery:  34w0d   Discharge Date:  2025    REASON FOR ADMISSION: Labor and Delivery    DIAGNOSIS:    1.  Birth secondary to  CHTN and IUGR  2. APGARS at 1 min 8, at 5 min 9  3. Baby's Weight 4 lbs 10.1oz  4. Post partum hypertension  5. Acute blood loss anemia    Conditions complicating antepartum/postpartum:  Anxiety  Hypertension  IUGR    PROCEDURES:  Lower transverse     SIGNIFICANT DIAGNOSTIC PROCEDURES:   none    CONSULTS: pain    HISTORY OF PRESENT ILLNESS AND HOSPITAL COURSE: This is a 29 year old   female who underwent  section without complication secondary to  chronic hypertension and IUGR  . Postoperative course was unremarkable. She did develop elevated blood pressures that required antihypertensives and there was some difficulty controlling her pain. On the day of discharge patient was tolerating diet, pain was controlled with oral medications, she was voiding and passing gas.    LABS:  Lab Results   Component Value Date    HGB 8.2 (L) 2025     Pre-operative hgb : 10.6      PENDING LABS:  none    DISPOSITION:  Home    DISCHARGE CONDITION: Good/Stable    DISCHARGE MEDICATIONS:      Review of your medicines        UNREVIEWED medicines. Ask your doctor about these medicines        Dose / Directions   Adderall XR 30 MG 24 hr capsule  Generic drug: amphetamine-dextroamphetamine      Dose: 30 mg  Take 30 mg by mouth daily.  Refills: 0     aspirin-acetaminophen-caffeine 250-250-65 MG tablet  Commonly known as: EXCEDRIN MIGRAINE      Dose: 1 tablet  Take 1 tablet by mouth daily as needed for headaches.  Refills: 0     escitalopram 20 MG tablet  Commonly known as: LEXAPRO      Dose: 20 mg  Take 20 mg by  mouth daily.  Refills: 0     hydrOXYzine HCl 50 MG tablet  Commonly known as: ATARAX  Used for: Anxiety      Dose: 100 mg  Take 2 tablets (100 mg) by mouth 3 times daily as needed for anxiety  Quantity: 40 tablet  Refills: 0     labetalol 100 MG tablet  Commonly known as: NORMODYNE      Dose: 100 mg  Take 100 mg by mouth 2 times daily.  Refills: 0     NIFEdipine ER 30 MG 24 hr tablet  Commonly known as: ADALAT CC  Used for: Chronic hypertension affecting pregnancy      Dose: 30 mg  Take 1 tablet (30 mg) by mouth 2 times daily.  Refills: 0              DISCHARGE PLAN:   - Follow up with  Dr. Maxwell, in 1wk  - Take medication as prescribed  - Physical activity: As tolerated, no heavy lifting. Pelvic rest.  - Diet:  Regular  - Medication:  Please see MAR  - Warning signs discussed with patient about when to call the clinic/hospital  - All questions and concerns were answered for the patient prior to discharge.         Donita Burton, DO on 1/20/2025 at 12:23 PM      I saw the patient on the date of discharge  Total time spent for discharge on date of discharge: 20 minutes    Physician(s) in addition to primary physician who should receive a copy:  CC1: Dr. Ruddy Maxwell

## 2025-01-20 NOTE — PROGRESS NOTES
"Postpartum Day 4:     Subjective:  The patient feels well. The patient has no emotional concerns. Pain is well controlled with current medications. The patient is ambulating well. She is voiding and passing gas.The amount and color of the lochia is appropriate for the duration of recovery, patient denies clots. The baby is well.    Objective   The patient has a blood pressure which is within the normal range.Urinary output is adequate.       Exam:   BP (!) 141/97 (BP Location: Left arm)   Pulse 83   Temp 98.1  F (36.7  C) (Oral)   Resp 18   Ht 1.702 m (5' 7\")   Wt 93.8 kg (206 lb 12.8 oz)   LMP 2024   SpO2 100%   Breastfeeding Unknown   BMI 32.39 kg/m  .  General: NAD  Abdomen: soft, NT   Incision: C/D/I  Fundus: firm, nontender  Ext: no pain       Impression / Plan:   Normal postpartum course. POD#4 LTCS secondary to CHTN; IUGR  PPH - [facility]; ICU admission  Somnolence - treated with Narcan x4    CHTN  -- Current meds: Nifedipine 60mg  -- Added Labetalol 200mg BID today  -- BP borderline    PAIN WITH SIGNIFICANT NARCOTIC NEED AND REQUIRED NARCAN  -- Pain consultation  -- Psych consultation - pending.    DISPOSTION  -- Hopeful for discharge home today  -- Plan follow up with Dr. Maxwell in 1wk secondary to blood pressure and pain control within 1wk        Donita Burton,  on 2025 at 10:03 AM    "

## 2025-01-20 NOTE — PROGRESS NOTES
[facility] RN Care Coordinator Note    Shital Tamayo  [id number]  1995    Chart reviewed, discharge plan discussed with bedside RN, needs assessed. Patient requests home care visit as ordered, nurse visit planned Wednesday, 25. [facility] Intake contacted by this writer; patient [facility] to [facility] schedule. Follow-up post-delivery appointment with  planned in 1 week at [facility]. [place] remains in NICU at this time due to prematurity.     RN Care Coordinator will continue to follow and assist as needed with discharge plan.

## 2025-01-20 NOTE — PROVIDER NOTIFICATION
01/20/25 0600   Provider Notification   Provider Name/Title Dr. Rios   Method of Notification In Department   Request Evaluate-Remote   Notification Reason Vital Signs Change;Status Update       Notified Dr. Rios of patients BPs 140s-150s/80-90s and 1 beat of clonus in the left foot overnight. Requesting further intervention. Dr. Rios plans to follow up on patient this AM and determine further intervention.

## 2025-01-20 NOTE — PROVIDER NOTIFICATION
Notified Dr. Burton of patient blood pressures, including blood pressure last night 2022 of 155/98. Most recent blood pressure reading 141/97.    New order for Labetalol 200 mg BID.     Liza Cortes RN on 1/20/2025 at 9:31 AM

## 2025-01-22 ENCOUNTER — PATIENT OUTREACH (OUTPATIENT)
Dept: CARE COORDINATION | Facility: CLINIC | Age: 30
End: 2025-01-22
Payer: COMMERCIAL

## 2025-01-22 NOTE — PROGRESS NOTES
[facility] Resource [place] Contact  [facility]/Voicemail     Clinical Data: Post-Discharge Outreach     Outreach attempted x 2. Missing or Invalid Number - Patient's phone did not ring. Call dropped immediately. W was unable to leave a message on patient's voicemail, providing [facility]'s central phone number of [phone] ([phone]) for questions/concerns and/or to schedule an appt with an [facility] provider, if they do not have a PCP.      Plan:  [facility] will do no further outreaches at this time.     LAKISHA Chaetham  [phone]  [facility]     *Connected Care Resource Team does NOT follow patient ongoing. Referrals are identified based on internal discharge reports and the outreach is to ensure patient has an understanding of their discharge instructions.

## 2025-07-13 DIAGNOSIS — G43.719 INTRACTABLE CHRONIC MIGRAINE WITHOUT AURA AND WITHOUT STATUS MIGRAINOSUS: ICD-10-CM

## 2025-07-15 RX ORDER — SUMATRIPTAN 50 MG/1
TABLET, FILM COATED ORAL
Qty: 8 TABLET | Refills: 1 | Status: SHIPPED | OUTPATIENT
Start: 2025-07-15

## 2025-07-19 ENCOUNTER — HEALTH MAINTENANCE LETTER (OUTPATIENT)
Age: 30
End: 2025-07-19

## (undated) DEVICE — GLOVE SURG PI ULTRA TOUCH M SZ 8 LF

## (undated) DEVICE — MAT FLOOR WATERPROOF BACKSHEET FMBP30

## (undated) DEVICE — SUTURE VICRYL+ 2-0 27IN CT-1 UND VCP259H

## (undated) DEVICE — GLOVE SURG PI ULTRA TOUCH M SZ 7 LF 42670

## (undated) DEVICE — SOL NACL 0.9% IRRIG 1000ML BOTTLE 2F7124

## (undated) DEVICE — DRESSING MEPILEX BORDER POST-OP 4X10

## (undated) DEVICE — SOL WATER IRRIG 1000ML BOTTLE 2F7114

## (undated) DEVICE — GOWN IMPERVIOUS BREATHABLE 2XL/XLONG

## (undated) DEVICE — STPL SKIN SUBCUTICULAR INSORB  2030

## (undated) DEVICE — BASIN EMESIS STERILE  SSK9005A

## (undated) DEVICE — GOWN IMPERVIOUS BREATHABLE SMART LG 89015

## (undated) DEVICE — BRIEF STRETCH XL MPS40

## (undated) DEVICE — SU CHROMIC 1 CT 27" 803H

## (undated) DEVICE — SU PROLENE 3-0 SHDA 36" 8522H

## (undated) DEVICE — SUTURE VICRYL+ 0 27IN CT-1 UND VCP260H

## (undated) DEVICE — DRSG TELFA 3X4" 1050

## (undated) DEVICE — SUCTION MANIFOLD NEPTUNE 2 SYS 1 PORT 702-025-000

## (undated) DEVICE — PACK MINOR SINGLE BASIN SSK3001

## (undated) DEVICE — GLOVE SURG PI ULTRA TOUCH M SZ 6 LF

## (undated) DEVICE — SU CHROMIC 0 CT 36" 914H

## (undated) DEVICE — CATH SUCTION 10FR W/TRAP DYND44110

## (undated) DEVICE — PAD BLADDER CNTRL SM BL 4IN X 9.75IN  1100B

## (undated) DEVICE — GLOVE PI ULTRATCH M LF SZ 6.5 PF CUFF TEXT STRL LF 42665

## (undated) DEVICE — GOWN LG DISP 9515

## (undated) DEVICE — PLATE GROUNDING ADULT W/CORD 9165L

## (undated) DEVICE — GLOVE BIOGEL PI ULTRATOUCH G SZ 8.0 42180

## (undated) DEVICE — ESU GROUND PAD ADULT REM W/15' CORD E7507DB

## (undated) DEVICE — CUSTOM PACK C-SECTION LHE

## (undated) DEVICE — GLOVE SURG PI ULTRA TOUCH M SZ 6-1/2 LF

## (undated) DEVICE — SUTURE PDS 0 60IN CTX+ LOOPED PDP990G

## (undated) DEVICE — SU VICRYL+ 3-0 CT1 36IN UND VCP944H

## (undated) DEVICE — SU DERMABOND ADVANCED .7ML DNX12

## (undated) DEVICE — GLOVE UNDER INDICATOR PI SZ 6 LF 41660

## (undated) DEVICE — PREP CHLORAPREP 26ML TINTED HI-LITE ORANGE 930815

## (undated) DEVICE — SUTURE VICRYL+ 3-0 27IN CT-1 UND VCP258H

## (undated) DEVICE — CUSTOM PACK PERI GYN SMA5BPGHEA

## (undated) DEVICE — BLADE CLIPPER PIVOT PURPLE DISP 9660

## (undated) DEVICE — PACK MAJOR BASIN 673

## (undated) DEVICE — PREP DYNA-HEX 4% CHG SCRUB 4OZ BOTTLE MDS098710

## (undated) DEVICE — DRAPE IOBAN 2 C-SECTION 3M 6697

## (undated) DEVICE — BLADE CLIPPER DISP 4406

## (undated) RX ORDER — KETOROLAC TROMETHAMINE 30 MG/ML
INJECTION, SOLUTION INTRAMUSCULAR; INTRAVENOUS
Status: DISPENSED
Start: 2022-06-22

## (undated) RX ORDER — MORPHINE SULFATE 1 MG/ML
INJECTION, SOLUTION EPIDURAL; INTRATHECAL; INTRAVENOUS
Status: DISPENSED
Start: 2022-06-22

## (undated) RX ORDER — PROPOFOL 10 MG/ML
INJECTION, EMULSION INTRAVENOUS
Status: DISPENSED
Start: 2025-01-16

## (undated) RX ORDER — FENTANYL CITRATE-0.9 % NACL/PF 10 MCG/ML
PLASTIC BAG, INJECTION (ML) INTRAVENOUS
Status: DISPENSED
Start: 2022-06-22

## (undated) RX ORDER — MORPHINE SULFATE 1 MG/ML
INJECTION, SOLUTION EPIDURAL; INTRATHECAL; INTRAVENOUS
Status: DISPENSED
Start: 2025-01-16

## (undated) RX ORDER — FENTANYL CITRATE 50 UG/ML
INJECTION, SOLUTION INTRAMUSCULAR; INTRAVENOUS
Status: DISPENSED
Start: 2025-01-16

## (undated) RX ORDER — KETOROLAC TROMETHAMINE 30 MG/ML
INJECTION, SOLUTION INTRAMUSCULAR; INTRAVENOUS
Status: DISPENSED
Start: 2025-01-16

## (undated) RX ORDER — LIDOCAINE HYDROCHLORIDE 10 MG/ML
INJECTION, SOLUTION EPIDURAL; INFILTRATION; INTRACAUDAL; PERINEURAL
Status: DISPENSED
Start: 2025-01-16